# Patient Record
Sex: FEMALE | Race: BLACK OR AFRICAN AMERICAN | NOT HISPANIC OR LATINO | URBAN - METROPOLITAN AREA
[De-identification: names, ages, dates, MRNs, and addresses within clinical notes are randomized per-mention and may not be internally consistent; named-entity substitution may affect disease eponyms.]

---

## 2017-03-26 ENCOUNTER — EMERGENCY (EMERGENCY)
Facility: HOSPITAL | Age: 52
LOS: 1 days | Discharge: HOME | End: 2017-03-26
Attending: EMERGENCY MEDICINE | Admitting: EMERGENCY MEDICINE
Payer: COMMERCIAL

## 2017-03-26 VITALS
TEMPERATURE: 98 F | SYSTOLIC BLOOD PRESSURE: 150 MMHG | OXYGEN SATURATION: 100 % | HEART RATE: 72 BPM | RESPIRATION RATE: 16 BRPM | DIASTOLIC BLOOD PRESSURE: 79 MMHG

## 2017-03-26 VITALS
TEMPERATURE: 98 F | SYSTOLIC BLOOD PRESSURE: 167 MMHG | OXYGEN SATURATION: 100 % | DIASTOLIC BLOOD PRESSURE: 73 MMHG | HEART RATE: 80 BPM | RESPIRATION RATE: 16 BRPM

## 2017-03-26 DIAGNOSIS — Z98.890 OTHER SPECIFIED POSTPROCEDURAL STATES: Chronic | ICD-10-CM

## 2017-03-26 DIAGNOSIS — Z87.2 PERSONAL HISTORY OF DISEASES OF THE SKIN AND SUBCUTANEOUS TISSUE: Chronic | ICD-10-CM

## 2017-03-26 LAB
ALBUMIN SERPL ELPH-MCNC: 4.2 G/DL — SIGNIFICANT CHANGE UP (ref 3.3–5)
ALP SERPL-CCNC: 89 U/L — SIGNIFICANT CHANGE UP (ref 40–120)
ALT FLD-CCNC: 21 U/L — SIGNIFICANT CHANGE UP (ref 4–33)
APPEARANCE UR: SIGNIFICANT CHANGE UP
AST SERPL-CCNC: 20 U/L — SIGNIFICANT CHANGE UP (ref 4–32)
BACTERIA # UR AUTO: SIGNIFICANT CHANGE UP
BASOPHILS # BLD AUTO: 0.01 K/UL — SIGNIFICANT CHANGE UP (ref 0–0.2)
BASOPHILS NFR BLD AUTO: 0.2 % — SIGNIFICANT CHANGE UP (ref 0–2)
BILIRUB SERPL-MCNC: 0.2 MG/DL — SIGNIFICANT CHANGE UP (ref 0.2–1.2)
BILIRUB UR-MCNC: NEGATIVE — SIGNIFICANT CHANGE UP
BLOOD UR QL VISUAL: HIGH
BUN SERPL-MCNC: 8 MG/DL — SIGNIFICANT CHANGE UP (ref 7–23)
CALCIUM SERPL-MCNC: 9.4 MG/DL — SIGNIFICANT CHANGE UP (ref 8.4–10.5)
CHLORIDE SERPL-SCNC: 101 MMOL/L — SIGNIFICANT CHANGE UP (ref 98–107)
CO2 SERPL-SCNC: 28 MMOL/L — SIGNIFICANT CHANGE UP (ref 22–31)
COLOR SPEC: YELLOW — SIGNIFICANT CHANGE UP
CREAT SERPL-MCNC: 0.68 MG/DL — SIGNIFICANT CHANGE UP (ref 0.5–1.3)
EOSINOPHIL # BLD AUTO: 0.14 K/UL — SIGNIFICANT CHANGE UP (ref 0–0.5)
EOSINOPHIL NFR BLD AUTO: 2.3 % — SIGNIFICANT CHANGE UP (ref 0–6)
GLUCOSE SERPL-MCNC: 283 MG/DL — HIGH (ref 70–99)
GLUCOSE UR-MCNC: >1000 — SIGNIFICANT CHANGE UP
HCG UR-SCNC: NEGATIVE — SIGNIFICANT CHANGE UP
HCT VFR BLD CALC: 39 % — SIGNIFICANT CHANGE UP (ref 34.5–45)
HGB BLD-MCNC: 12.5 G/DL — SIGNIFICANT CHANGE UP (ref 11.5–15.5)
IMM GRANULOCYTES NFR BLD AUTO: 0.2 % — SIGNIFICANT CHANGE UP (ref 0–1.5)
KETONES UR-MCNC: NEGATIVE — SIGNIFICANT CHANGE UP
LEUKOCYTE ESTERASE UR-ACNC: HIGH
LIDOCAIN IGE QN: 35.6 U/L — SIGNIFICANT CHANGE UP (ref 7–60)
LYMPHOCYTES # BLD AUTO: 2.37 K/UL — SIGNIFICANT CHANGE UP (ref 1–3.3)
LYMPHOCYTES # BLD AUTO: 39.5 % — SIGNIFICANT CHANGE UP (ref 13–44)
MCHC RBC-ENTMCNC: 27.8 PG — SIGNIFICANT CHANGE UP (ref 27–34)
MCHC RBC-ENTMCNC: 32.1 % — SIGNIFICANT CHANGE UP (ref 32–36)
MCV RBC AUTO: 86.9 FL — SIGNIFICANT CHANGE UP (ref 80–100)
MONOCYTES # BLD AUTO: 0.33 K/UL — SIGNIFICANT CHANGE UP (ref 0–0.9)
MONOCYTES NFR BLD AUTO: 5.5 % — SIGNIFICANT CHANGE UP (ref 2–14)
MUCOUS THREADS # UR AUTO: SIGNIFICANT CHANGE UP
NEUTROPHILS # BLD AUTO: 3.14 K/UL — SIGNIFICANT CHANGE UP (ref 1.8–7.4)
NEUTROPHILS NFR BLD AUTO: 52.3 % — SIGNIFICANT CHANGE UP (ref 43–77)
NITRITE UR-MCNC: NEGATIVE — SIGNIFICANT CHANGE UP
PH UR: 6 — SIGNIFICANT CHANGE UP (ref 4.6–8)
PLATELET # BLD AUTO: 308 K/UL — SIGNIFICANT CHANGE UP (ref 150–400)
PMV BLD: 9.9 FL — SIGNIFICANT CHANGE UP (ref 7–13)
POTASSIUM SERPL-MCNC: 4 MMOL/L — SIGNIFICANT CHANGE UP (ref 3.5–5.3)
POTASSIUM SERPL-SCNC: 4 MMOL/L — SIGNIFICANT CHANGE UP (ref 3.5–5.3)
PROT SERPL-MCNC: 7.4 G/DL — SIGNIFICANT CHANGE UP (ref 6–8.3)
PROT UR-MCNC: 10 — SIGNIFICANT CHANGE UP
RBC # BLD: 4.49 M/UL — SIGNIFICANT CHANGE UP (ref 3.8–5.2)
RBC # FLD: 13.3 % — SIGNIFICANT CHANGE UP (ref 10.3–14.5)
RBC CASTS # UR COMP ASSIST: >50 — HIGH (ref 0–?)
SODIUM SERPL-SCNC: 144 MMOL/L — SIGNIFICANT CHANGE UP (ref 135–145)
SP GR SPEC: 1.02 — SIGNIFICANT CHANGE UP (ref 1–1.03)
SQUAMOUS # UR AUTO: SIGNIFICANT CHANGE UP
UROBILINOGEN FLD QL: NORMAL E.U. — SIGNIFICANT CHANGE UP (ref 0.1–0.2)
WBC # BLD: 6 K/UL — SIGNIFICANT CHANGE UP (ref 3.8–10.5)
WBC # FLD AUTO: 6 K/UL — SIGNIFICANT CHANGE UP (ref 3.8–10.5)
WBC UR QL: HIGH (ref 0–?)

## 2017-03-26 PROCEDURE — 74176 CT ABD & PELVIS W/O CONTRAST: CPT | Mod: 26

## 2017-03-26 PROCEDURE — 99284 EMERGENCY DEPT VISIT MOD MDM: CPT

## 2017-03-26 RX ORDER — PSEUDOEPHEDRINE HCL 30 MG
30 TABLET ORAL ONCE
Qty: 0 | Refills: 0 | Status: COMPLETED | OUTPATIENT
Start: 2017-03-26 | End: 2017-03-26

## 2017-03-26 RX ORDER — MOXIFLOXACIN HYDROCHLORIDE TABLETS, 400 MG 400 MG/1
1 TABLET, FILM COATED ORAL
Qty: 14 | Refills: 0 | OUTPATIENT
Start: 2017-03-26 | End: 2017-04-02

## 2017-03-26 RX ORDER — SODIUM CHLORIDE 9 MG/ML
1000 INJECTION INTRAMUSCULAR; INTRAVENOUS; SUBCUTANEOUS ONCE
Qty: 0 | Refills: 0 | Status: COMPLETED | OUTPATIENT
Start: 2017-03-26 | End: 2017-03-26

## 2017-03-26 RX ORDER — ACETAMINOPHEN 500 MG
650 TABLET ORAL ONCE
Qty: 0 | Refills: 0 | Status: COMPLETED | OUTPATIENT
Start: 2017-03-26 | End: 2017-03-26

## 2017-03-26 RX ADMIN — Medication 650 MILLIGRAM(S): at 22:09

## 2017-03-26 RX ADMIN — Medication 650 MILLIGRAM(S): at 21:39

## 2017-03-26 RX ADMIN — Medication 30 MILLIGRAM(S): at 17:23

## 2017-03-26 RX ADMIN — SODIUM CHLORIDE 1000 MILLILITER(S): 9 INJECTION INTRAMUSCULAR; INTRAVENOUS; SUBCUTANEOUS at 16:26

## 2017-03-26 NOTE — ED ADULT NURSE NOTE - NS ED NURSE DISCH DISPOSITION
AMA (saw a physician/midlevel provider and clinician was able to provide reasons for staying for treatment & form is signed) Discharged

## 2017-03-26 NOTE — ED ADULT NURSE NOTE - OBJECTIVE STATEMENT
Report received from RW nurse at 8:15PM. Patient awaiting CT results for flank pain with hematuria. Patient complains of moderate headache, attending aware. VS as documented.

## 2017-03-26 NOTE — ED PROVIDER NOTE - ABDOMINAL TENDER
periumbilical/umbilical/left costovertebral angle/suprapubic/left lower quadrant/epigastric/left upper quadrant/right lower quadrant/right upper quadrant

## 2017-03-26 NOTE — ED ADULT NURSE REASSESSMENT NOTE - NS ED NURSE REASSESS COMMENT FT1
Patient complains of IV discomfort, patient educated on importance of having access for blood draws and medication administration. Patient refused, attending aware. IV line discontinued.

## 2017-03-26 NOTE — ED PROVIDER NOTE - PROGRESS NOTE DETAILS
ED Attending Dr. Barajas: pt signed out to me from Dr. Suazo with plan to follow up CTAP; pt went for CT but states she was unable to breathe while laying on abdomen due to nasal congestion; gave pt oxymetazoline nasal spray and will have pt attempt CT again ED Attending Dr. Barajas: pt in NAD--does not want to wait for CT results; I explained to pt that leaving before CT results may mean leaving with a treatable condition that we may not find while she is in the ED; also expressed risk of pain/disability/death from leaving without results; pt understands and would like to leave AMA; I advised pt to return to ED at any time for any concerns; I prescribed pt 7 days of abx to treat for pyelonephritis in light of UA result and flank pain; advised PMD follow up within 48 hours; I left message with PA follow up line ([id number]) so that pt will be followed up ED Attending Dr. Barajas: gave pt copies of labs; left AMA paperwork in chart ED Attending Dr. Barajas: pt decided that she will stay until CT is resulted ED Attending Dr. Barajas: spoke to radiology--CT shows mild hydro and 3 mm distal ureter stone; discharge plan is same, with abx and ED Attending Dr. Barajas: checked FS because on CMP glucose was 283; FS at time of discharge; gave pt urology follow up list and instructions to follow up with urology within 1 week; gave strict return instructions for any worsening symptoms ED Attending Dr. Barajas: spoke to radiology--CT shows mild hydro and 3 mm distal ureter stone; discharge plan is same, with abx and will also give urology follow up; renal function WNL and WBC WNL ED Attending Dr. Barajas: checked FS because on CMP glucose was 283; FS at time of discharge after eating 248, glucose >1000 in UA, likely not well controlled DM at this time, recommended that pt follow up with PMD for further diabetic control; gave pt urology follow up list and instructions to follow up with urology within 1 week; gave strict return instructions for any worsening symptoms; pt to receive follow up call from [phone] follow up line ED Attending Dr. Barajas: checked FS because on [facility] glucose was 283--pt states she did not take her insulin last night and was without her DM medications during the day today; FS at time of discharge after eating 248, glucose >1000 in UA, likely not well controlled DM at this time, recommended that pt follow up with PMD for further diabetic control; gave pt urology follow up list and instructions to follow up with urology within 1 week; gave strict return instructions for any worsening symptoms; pt to receive follow up call from [facility] follow up line

## 2017-03-26 NOTE — ED PROVIDER NOTE - NS ED MD SCRIBE ATTENDING SCRIBE SECTIONS
PAST MEDICAL/SURGICAL/SOCIAL HISTORY/DISPOSITION/REVIEW OF SYSTEMS/HIV/PHYSICAL EXAM/VITAL SIGNS( Pullset)/HISTORY OF PRESENT ILLNESS

## 2017-03-26 NOTE — ED ADULT TRIAGE NOTE - CHIEF COMPLAINT QUOTE
states" I am having pain in my left side , with blood in the urine since Thursday with chills." was seen at urgent care center sent in for possible kidney stone

## 2017-03-26 NOTE — ED PROVIDER NOTE - OBJECTIVE STATEMENT
50yo F with PMHx of HTN (on Norvasc , HLD (on Crestor), DM (on Metformin, Lantus, Novalog), asthma (on albuterol), anemia (on Iron), seasonal allergies, fibroids (s/p surgery), presents to ED c/o L flank pain radiating around to her LLQ, dysuria for x4d. Pt also c/o nasal congestion and throat pain. Pt states pain is worse today, she was seen at an Urgi who did a UA, noticed blood in urine, and advised pt to come to ED to eval for kidney stones. LNMP Nov 16, 2016, pt perimenopausal. Denies vomiting. Allergy to sulfa.

## 2017-03-26 NOTE — ED PROVIDER NOTE - PLAN OF CARE
1. TAKE ALL MEDICATIONS AS DIRECTED.  2. FOLLOW UP WITH YOUR PRIMARY DOCTOR WITHIN 2 DAYS AS DIRECTED.  3. IF NEEDED, CALL PATIENT ACCESS SERVICES AT [phone] TO FIND A PRIMARY CARE PHYSICIAN.  OR CALL [phone] TO MAKE AN APPOINTMENT WITH THE CLINIC.  4. RETURN TO THE ER FOR ANY WORSENING SYMPTOMS OR CONCERNS.

## 2017-03-26 NOTE — ED PROVIDER NOTE - CARE PLAN
Principal Discharge DX:	Pyelonephritis  Instructions for follow-up, activity and diet:	1. TAKE ALL MEDICATIONS AS DIRECTED.  2. FOLLOW UP WITH YOUR PRIMARY DOCTOR WITHIN 2 DAYS AS DIRECTED.  3. IF NEEDED, CALL PATIENT ACCESS SERVICES AT [phone] TO FIND A PRIMARY CARE PHYSICIAN.  OR CALL [phone] TO MAKE AN APPOINTMENT WITH THE CLINIC.  4. RETURN TO THE ER FOR ANY WORSENING SYMPTOMS OR CONCERNS. Principal Discharge DX:	Pyelonephritis  Instructions for follow-up, activity and diet:	1. TAKE ALL MEDICATIONS AS DIRECTED.  2. FOLLOW UP WITH YOUR PRIMARY DOCTOR WITHIN 2 DAYS AS DIRECTED.  3. IF NEEDED, CALL PATIENT ACCESS SERVICES AT [phone] TO FIND A PRIMARY CARE PHYSICIAN.  OR CALL [phone] TO MAKE AN APPOINTMENT WITH THE CLINIC.  4. RETURN TO THE ER FOR ANY WORSENING SYMPTOMS OR CONCERNS. Principal Discharge DX:	Pyelonephritis  Instructions for follow-up, activity and diet:	1. TAKE ALL MEDICATIONS AS DIRECTED.  2. FOLLOW UP WITH YOUR PRIMARY DOCTOR WITHIN 2 DAYS AS DIRECTED.  3. IF NEEDED, CALL PATIENT ACCESS SERVICES AT [phone] TO FIND A PRIMARY CARE PHYSICIAN.  OR CALL [phone] TO MAKE AN APPOINTMENT WITH THE CLINIC.  4. RETURN TO THE ER FOR ANY WORSENING SYMPTOMS OR CONCERNS. Principal Discharge DX:	Pyelonephritis  Instructions for follow-up, activity and diet:	1. TAKE ALL MEDICATIONS AS DIRECTED.  2. FOLLOW UP WITH YOUR PRIMARY DOCTOR WITHIN 2 DAYS AS DIRECTED.  3. IF NEEDED, CALL PATIENT ACCESS SERVICES AT [phone] TO FIND A PRIMARY CARE PHYSICIAN.  OR CALL [phone] TO MAKE AN APPOINTMENT WITH THE CLINIC.  4. RETURN TO THE ER FOR ANY WORSENING SYMPTOMS OR CONCERNS. Principal Discharge DX:	Pyelonephritis  Instructions for follow-up, activity and diet:	1. TAKE ALL MEDICATIONS AS DIRECTED.  2. FOLLOW UP WITH YOUR PRIMARY DOCTOR WITHIN 2 DAYS AS DIRECTED.  3. IF NEEDED, CALL PATIENT ACCESS SERVICES AT [phone] TO FIND A PRIMARY CARE PHYSICIAN.  OR CALL [phone] TO MAKE AN APPOINTMENT WITH THE CLINIC.  4. RETURN TO THE ER FOR ANY WORSENING SYMPTOMS OR CONCERNS. Principal Discharge DX:	Pyelonephritis  Instructions for follow-up, activity and diet:	1. TAKE ALL MEDICATIONS AS DIRECTED.  2. FOLLOW UP WITH YOUR PRIMARY DOCTOR WITHIN 2 DAYS AS DIRECTED.  3. IF NEEDED, CALL PATIENT ACCESS SERVICES AT [phone] TO FIND A PRIMARY CARE PHYSICIAN.  OR CALL [phone] TO MAKE AN APPOINTMENT WITH THE CLINIC.  4. RETURN TO THE ER FOR ANY WORSENING SYMPTOMS OR CONCERNS. Principal Discharge DX:	Pyelonephritis  Instructions for follow-up, activity and diet:	1. TAKE ALL MEDICATIONS AS DIRECTED.  2. FOLLOW UP WITH YOUR PRIMARY DOCTOR WITHIN 2 DAYS AS DIRECTED.  3. IF NEEDED, CALL PATIENT ACCESS SERVICES AT [phone] TO FIND A PRIMARY CARE PHYSICIAN.  OR CALL [phone] TO MAKE AN APPOINTMENT WITH THE CLINIC.  4. RETURN TO THE ER FOR ANY WORSENING SYMPTOMS OR CONCERNS.  Secondary Diagnosis:	Nephrolithiasis Principal Discharge DX:	Pyelonephritis  Instructions for follow-up, activity and diet:	1. TAKE ALL MEDICATIONS AS DIRECTED.  2. FOLLOW UP WITH YOUR PRIMARY DOCTOR WITHIN 2 DAYS AS DIRECTED.  3. IF NEEDED, CALL PATIENT ACCESS SERVICES AT [phone] TO FIND A PRIMARY CARE PHYSICIAN.  OR CALL [phone] TO MAKE AN APPOINTMENT WITH THE CLINIC.  4. RETURN TO THE ER FOR ANY WORSENING SYMPTOMS OR CONCERNS.  Secondary Diagnosis:	Nephrolithiasis

## 2017-03-26 NOTE — ED PROVIDER NOTE - PMH
Anemia    Asthma    Diabetes    Fibroids    History of hypertension    Hypercholesterolemia    Seasonal allergies

## 2017-03-27 NOTE — ED POST DISCHARGE NOTE - RESULT SUMMARY
Pt left ED prior to CT scan result.  Pt seen in ED for flank pain.  Call pt to inform her of CT scan results and follow up status.

## 2017-11-04 ENCOUNTER — EMERGENCY (EMERGENCY)
Facility: HOSPITAL | Age: 52
LOS: 1 days | Discharge: HOME | End: 2017-11-04
Attending: EMERGENCY MEDICINE | Admitting: EMERGENCY MEDICINE
Payer: COMMERCIAL

## 2017-11-04 VITALS
DIASTOLIC BLOOD PRESSURE: 65 MMHG | HEART RATE: 66 BPM | OXYGEN SATURATION: 100 % | SYSTOLIC BLOOD PRESSURE: 127 MMHG | RESPIRATION RATE: 16 BRPM

## 2017-11-04 VITALS
OXYGEN SATURATION: 99 % | RESPIRATION RATE: 18 BRPM | HEART RATE: 88 BPM | SYSTOLIC BLOOD PRESSURE: 140 MMHG | DIASTOLIC BLOOD PRESSURE: 84 MMHG | TEMPERATURE: 98 F

## 2017-11-04 DIAGNOSIS — Z87.2 PERSONAL HISTORY OF DISEASES OF THE SKIN AND SUBCUTANEOUS TISSUE: Chronic | ICD-10-CM

## 2017-11-04 DIAGNOSIS — Z98.890 OTHER SPECIFIED POSTPROCEDURAL STATES: Chronic | ICD-10-CM

## 2017-11-04 PROCEDURE — 73502 X-RAY EXAM HIP UNI 2-3 VIEWS: CPT | Mod: 26,RT

## 2017-11-04 PROCEDURE — 99285 EMERGENCY DEPT VISIT HI MDM: CPT | Mod: 25

## 2017-11-04 PROCEDURE — 73564 X-RAY EXAM KNEE 4 OR MORE: CPT | Mod: 26,RT

## 2017-11-04 RX ORDER — MORPHINE SULFATE 50 MG/1
2 CAPSULE, EXTENDED RELEASE ORAL ONCE
Qty: 0 | Refills: 0 | Status: DISCONTINUED | OUTPATIENT
Start: 2017-11-04 | End: 2017-11-04

## 2017-11-04 RX ORDER — ACETAMINOPHEN 500 MG
650 TABLET ORAL ONCE
Qty: 0 | Refills: 0 | Status: COMPLETED | OUTPATIENT
Start: 2017-11-04 | End: 2017-11-04

## 2017-11-04 RX ORDER — IBUPROFEN 200 MG
600 TABLET ORAL ONCE
Qty: 0 | Refills: 0 | Status: COMPLETED | OUTPATIENT
Start: 2017-11-04 | End: 2017-11-04

## 2017-11-04 RX ADMIN — MORPHINE SULFATE 2 MILLIGRAM(S): 50 CAPSULE, EXTENDED RELEASE ORAL at 04:23

## 2017-11-04 RX ADMIN — Medication 600 MILLIGRAM(S): at 09:20

## 2017-11-04 RX ADMIN — MORPHINE SULFATE 2 MILLIGRAM(S): 50 CAPSULE, EXTENDED RELEASE ORAL at 06:55

## 2017-11-04 RX ADMIN — Medication 650 MILLIGRAM(S): at 09:19

## 2017-11-04 NOTE — ED PROVIDER NOTE - PROGRESS NOTE DETAILS
Rhiannon Eldridge, DO: Pt signed out to me by overnight team. Pt ambulatory with limp. Has hx of meniscal tear with +Meredith's test. Will d/c with f/u with Ortho - pt aware. Stable for discharge. Declining stronger pain medication. Educated re: RICE & otc pain control.

## 2017-11-04 NOTE — ED PROVIDER NOTE - CARE PLAN
Principal Discharge DX:	Right knee pain Principal Discharge DX:	Right knee pain  Instructions for follow-up, activity and diet:	1. Please follow up with your Orthopedic doctor in 3-5 days or a doctor given the list provided.  2. Please use 600mg Motrin (also called Advil or ibuprofen) every 6 hours as needed for pain/discomfort/swelling.  You can take this together with Tylenol 650mg  (also called acetaminophen) every 6 hours.  Both of these medications can be obtained over the counter.  Don't use more than 3500mg of Tylenol in any 24-hour period. Make sure your other prescription/over-the-counter medications don't contain any Tylenol so you don't take too much. If you have any stomach discomfort while taking Motrin, you can use TUMS or Pepcid or Zantac (these can all be bought without a prescription).   3. Rest, ice, and elevate the extremity.   4. Please return to the ED should you have any new or worsening symptoms or have any new concerns.   5. Read all attached.

## 2017-11-04 NOTE — ED ADULT NURSE NOTE - OBJECTIVE STATEMENT
pt. received in room 25 , A&Ox4 c/o right knee pain. Pt. states while she was dancing her knee felt like it "locked "and states she fell. Pt. denies any hit to the head. Pt. Vitals as noted, and in no acute distress. Awaiting pt. to be seen by MD. Pt. states to have hx. of DM . Will continue to monitor while in the ED.

## 2017-11-04 NOTE — ED PROVIDER NOTE - ATTENDING CONTRIBUTION TO CARE
Lenny: 51 yo female s/p mechanical fall c/o right knee and hip pain. Pt has chronic knee pain secondary to meniscus tear. Pt was dancing when her knee gave out and she fell. Pt reports that she fell backwards but denies head trauma and lOC. Denies direct trauma to her hip and knee. Exam: + right hip and lateral knee TTP, neurovascularly intact distally., no soft tissue swelling. Plan: xray

## 2017-11-04 NOTE — ED PROVIDER NOTE - MEDICAL DECISION MAKING DETAILS
51yo F with HTN, DM2, asthma, HLD, h/o R knee meniscal tear/repair who presents with sudden onset R knee pain and inability to bear weight. Will control pain, get XR knee and hip.

## 2017-11-04 NOTE — ED ADULT NURSE NOTE - CHIEF COMPLAINT QUOTE
Pt arrives via EMS. Pt states she was dancing at a club when "all of a sudden her R knee gave way" and she fell to the floor. Pt denies hitting head. R knee appears slightly swollen. Pt has hx of torn meniscus in R knee 2013.   EMS: Per EMS, upon arrival pt unable to bear weight on R leg was crying out in extreme pain; 20G in R Hand with 5mg MSO4 given en route to ER. Pt reports pain 0/10 at present.

## 2017-11-04 NOTE — ED PROVIDER NOTE - OBJECTIVE STATEMENT
51yo F with HTN, DM2 on insulin, HLD who presents with acute onset right knee pain. Patient was dancing tonight and her right knee suddenly gave out on her and she fell, landing on his butt. She denies hitting her head. She then started to have sharp pain in the knee both medial and lateral joint lines and superior to the patella. She has been unable to bear weight on that leg since the fall. She has a history of meniscal tear/repair of the R knee in 2013. She also reports right hip pain. She denies trauma to the knee or any snapping sensation. She denies headache, dizziness, LOC, chest pain, SOB, abdominal pain. 51yo F with HTN, DM2 on insulin, HLD, asthma who presents with acute onset right knee pain. Patient was dancing tonight and her right knee suddenly gave out on her and she fell, landing on his butt. She denies hitting her head. She then started to have sharp pain in the knee both medial and lateral joint lines and superior to the patella. She has been unable to bear weight on that leg since the fall. She has a history of meniscal tear/repair of the R knee in 2013. She also reports right hip pain. She denies trauma to the knee or any snapping sensation. She denies headache, dizziness, LOC, chest pain, SOB, abdominal pain.

## 2017-11-04 NOTE — ED PROVIDER NOTE - PLAN OF CARE
1. Please follow up with your Orthopedic doctor in 3-5 days or a doctor given the list provided.  2. Please use 600mg Motrin (also called Advil or ibuprofen) every 6 hours as needed for pain/discomfort/swelling.  You can take this together with Tylenol 650mg  (also called acetaminophen) every 6 hours.  Both of these medications can be obtained over the counter.  Don't use more than 3500mg of Tylenol in any 24-hour period. Make sure your other prescription/over-the-counter medications don't contain any Tylenol so you don't take too much. If you have any stomach discomfort while taking Motrin, you can use TUMS or Pepcid or Zantac (these can all be bought without a prescription).   3. Rest, ice, and elevate the extremity.   4. Please return to the ED should you have any new or worsening symptoms or have any new concerns.   5. Read all attached.

## 2017-11-04 NOTE — ED PROVIDER NOTE - NS ED ROS FT
REVIEW OF SYSTEMS:  CONSTITUTIONAL: No fever, weight loss, or fatigue  EYES: No eye pain, visual disturbances, or discharge  ENMT:  No difficulty hearing, tinnitus, vertigo; No sinus or throat pain  NECK: No pain or stiffness  RESPIRATORY: No cough, wheezing, chills or hemoptysis; No shortness of breath  CARDIOVASCULAR: No chest pain, palpitations, dizziness, or leg swelling  GASTROINTESTINAL: No abdominal or epigastric pain. No nausea, vomiting, or hematemesis; No diarrhea or constipation. No melena or hematochezia.  GENITOURINARY: No dysuria, frequency, hematuria, or incontinence  NEUROLOGICAL: No headaches, memory loss, loss of strength, numbness, or tremors  SKIN: No itching, burning, rashes, or lesions   ENDOCRINE: No heat or cold intolerance; No hair loss  MUSCULOSKELETAL: +right knee, hip pain; No muscle or back pain  PSYCHIATRIC: No depression, anxiety, mood swings, or difficulty sleeping  HEME/LYMPH: No easy bruising, or bleeding gums  ALLERY AND IMMUNOLOGIC: No hives or eczema

## 2017-11-04 NOTE — ED PROVIDER NOTE - PHYSICAL EXAMINATION
PHYSICAL EXAM:  GENERAL: NAD, obese, responding to questions appropriately  HEAD:  Atraumatic, Normocephalic  EYES: PERRLA, conjunctiva and sclera clear  ENMT: No tonsillar erythema, exudates, or enlargement; Moist mucous membranes, Good dentition, No lesions  CHEST/LUNG: Clear to auscultation bilaterally; No rales, rhonchi, wheezing, or rubs  HEART: Regular rate and rhythm; No murmurs, rubs, or gallops  ABDOMEN: Soft, Nontender, Nondistended; Bowel sounds present  VASCULAR:  2+ Peripheral Pulses, No clubbing, cyanosis, or edema  EXTREMITIES: R knee with mild swelling, no erythema or warmth, tenderness to palpation medial and lateral joint lines as well as pain with patellar movement, pain on palpation of popliteal fossa; limited flexion 2/2 pain; L knee without abnormality, full ROM, no tenderness.  SKIN: No rashes or lesions  NERVOUS SYSTEM:  Alert & Oriented X3, Good concentration

## 2017-11-04 NOTE — ED ADULT TRIAGE NOTE - CHIEF COMPLAINT QUOTE
Pt arrives via EMS. Pt states she was dancing at a club when "all of a sudden her R knee gave way" and she well to the floor. Pt denies hitting head. R knee appears slightly swollen.Pt has hx of torn meniscus in R knee 2013.   EMS: 20G in R Hand with 5mg MSO4 given en router to ER. Pt reports pain 0/10 at present. Pt arrives via EMS. Pt states she was dancing at a club when "all of a sudden her R knee gave way" and she fell to the floor. Pt denies hitting head. R knee appears slightly swollen.Pt has hx of torn meniscus in R knee 2013.   EMS: 20G in R Hand with 5mg MSO4 given en router to ER. Pt reports pain 0/10 at present. Pt arrives via EMS. Pt states she was dancing at a club when "all of a sudden her R knee gave way" and she fell to the floor. Pt denies hitting head. R knee appears slightly swollen.Pt has hx of torn meniscus in R knee 2013.   EMS: 20G in R Hand with 5mg MSO4 given en route to ER. Pt reports pain 0/10 at present. Pt arrives via EMS. Pt states she was dancing at a club when "all of a sudden her R knee gave way" and she fell to the floor. Pt denies hitting head. R knee appears slightly swollen. Pt has hx of torn meniscus in R knee 2013.   EMS: Per EMS, upon arrival pt unable to bear weight on R leg was crying out in extreme pain; 20G in R Hand with 5mg MSO4 given en route to ER. Pt reports pain 0/10 at present.

## 2017-11-04 NOTE — ED PROCEDURE NOTE - ATTENDING CONTRIBUTION TO CARE
Attending Statement: I have personally seen and examined this patient. I have fully participated in the care of this patient. I have reviewed all pertinent clinical information, including history physical exam, plan and the Resident's note and agree except as noted  pt comfortable w plan and discharge. able to use cane wo difficulty.

## 2017-11-04 NOTE — ED PROCEDURE NOTE - PROCEDURE ADDITIONAL DETAILS
Rhiannon Eldridge DO: Pt's R knee wrapped with compression dressing of ACE bandages x 2. Discharged with cane for assistance with ambulation.

## 2018-01-12 NOTE — ED ADULT NURSE NOTE - NS ED NOTE ABUSE SUSPICION NEGLECT YN
Physical Therapy Daily Treatment     Visit Count: 36  Plan of Care Dates: Initial: 10/10/2017 Through: 1/2/2018    Insurance Information: [employer] ACTIVE PER WEB, $15 COPAY, NO VISIT LIMIT NO AUTH REQUIRED  Next Referring Provider Visit: 12/12/17    Referred by: Suhail Garcia MD  Medical Diagnosis (from order):  Right total knee arthroplasty   Treatment Diagnosis: Knee Symptoms with Pain, Impaired Joint Mobility, Impaired Range of Motion, Impaired Motor Function/Muscle Performance, Impaired Gait/Locomotion Deficits and Impaired Balance  Insurance: 1. [employer] MEDICARE  2.     Date of Onset: 10/4/17   Diagnosis Precautions: Weight Bearing As Tolerated Right Lower Extremity  Chart reviewed: Relevant co-morbidities, allergies, tests and medications:      SUBJECTIVE   Pt reports no changes.    OBJECTIVE     Active assistive range of motion flexion seated 114 degrees after Nu step and bike.     12/22/17 12/27/17 12/29/17 1/2/18 1/5/18 1/10/18 1/12/18   NuStep seat 6 8 min 8 min 8 min Seat 4 to 3  8 min Seat 3  8 min 8 min 8 min   Bike seat 4 8 min 8 min 8 min 8 min 8 min 8 min 8 min   True stretch flexion 60\" 60\" 60\"  60\" 60\" 60\"   True stretch ext 60\" 60\" 60\"  60\" 60\" 60\"   gastroc board stretch 60\" 60\" 60\"  60\" 60\" 60\"       Post skilled treatment: Ice 10 minutes      Current Home Program   Decrease the lunges, steps, etc to once per day    ASSESSMENT   New record for flexion.    PLAN    2x/week for 2 weeks then 1x/week for 2 weeks.    THERAPY DAILY BILLING   Primary Insurance:  [employer] MEDICARE  Secondary Insurance:     Evaluation Procedures:  No evaluation codes were used on this date of service    Timed Procedures:  Therapeutic Exercise, 20 minutes    Untimed Procedures:  No untimed codes were used on this date of service        no

## 2018-01-18 ENCOUNTER — OUTPATIENT (OUTPATIENT)
Dept: OUTPATIENT SERVICES | Facility: HOSPITAL | Age: 53
LOS: 1 days | End: 2018-01-18
Payer: COMMERCIAL

## 2018-01-18 VITALS
HEIGHT: 63 IN | TEMPERATURE: 99 F | WEIGHT: 190.92 LBS | RESPIRATION RATE: 14 BRPM | SYSTOLIC BLOOD PRESSURE: 136 MMHG | HEART RATE: 71 BPM | DIASTOLIC BLOOD PRESSURE: 84 MMHG

## 2018-01-18 DIAGNOSIS — D25.9 LEIOMYOMA OF UTERUS, UNSPECIFIED: ICD-10-CM

## 2018-01-18 DIAGNOSIS — Z98.51 TUBAL LIGATION STATUS: Chronic | ICD-10-CM

## 2018-01-18 DIAGNOSIS — Z98.890 OTHER SPECIFIED POSTPROCEDURAL STATES: Chronic | ICD-10-CM

## 2018-01-18 DIAGNOSIS — E11.9 TYPE 2 DIABETES MELLITUS WITHOUT COMPLICATIONS: ICD-10-CM

## 2018-01-18 DIAGNOSIS — Z87.2 PERSONAL HISTORY OF DISEASES OF THE SKIN AND SUBCUTANEOUS TISSUE: Chronic | ICD-10-CM

## 2018-01-18 DIAGNOSIS — D25.9 LEIOMYOMA OF UTERUS, UNSPECIFIED: Chronic | ICD-10-CM

## 2018-01-18 LAB
BLD GP AB SCN SERPL QL: NEGATIVE — SIGNIFICANT CHANGE UP
BUN SERPL-MCNC: 12 MG/DL — SIGNIFICANT CHANGE UP (ref 7–23)
CALCIUM SERPL-MCNC: 9.5 MG/DL — SIGNIFICANT CHANGE UP (ref 8.4–10.5)
CHLORIDE SERPL-SCNC: 100 MMOL/L — SIGNIFICANT CHANGE UP (ref 98–107)
CO2 SERPL-SCNC: 29 MMOL/L — SIGNIFICANT CHANGE UP (ref 22–31)
CREAT SERPL-MCNC: 0.79 MG/DL — SIGNIFICANT CHANGE UP (ref 0.5–1.3)
GLUCOSE SERPL-MCNC: 233 MG/DL — HIGH (ref 70–99)
HBA1C BLD-MCNC: 10.2 % — HIGH (ref 4–5.6)
HCT VFR BLD CALC: 39.4 % — SIGNIFICANT CHANGE UP (ref 34.5–45)
HGB BLD-MCNC: 12.6 G/DL — SIGNIFICANT CHANGE UP (ref 11.5–15.5)
MCHC RBC-ENTMCNC: 27.9 PG — SIGNIFICANT CHANGE UP (ref 27–34)
MCHC RBC-ENTMCNC: 32 % — SIGNIFICANT CHANGE UP (ref 32–36)
MCV RBC AUTO: 87.2 FL — SIGNIFICANT CHANGE UP (ref 80–100)
NRBC # FLD: 0 — SIGNIFICANT CHANGE UP
PLATELET # BLD AUTO: 309 K/UL — SIGNIFICANT CHANGE UP (ref 150–400)
PMV BLD: 9.8 FL — SIGNIFICANT CHANGE UP (ref 7–13)
POTASSIUM SERPL-MCNC: 3.8 MMOL/L — SIGNIFICANT CHANGE UP (ref 3.5–5.3)
POTASSIUM SERPL-SCNC: 3.8 MMOL/L — SIGNIFICANT CHANGE UP (ref 3.5–5.3)
RBC # BLD: 4.52 M/UL — SIGNIFICANT CHANGE UP (ref 3.8–5.2)
RBC # FLD: 13.7 % — SIGNIFICANT CHANGE UP (ref 10.3–14.5)
RH IG SCN BLD-IMP: POSITIVE — SIGNIFICANT CHANGE UP
SODIUM SERPL-SCNC: 143 MMOL/L — SIGNIFICANT CHANGE UP (ref 135–145)
WBC # BLD: 5.45 K/UL — SIGNIFICANT CHANGE UP (ref 3.8–10.5)
WBC # FLD AUTO: 5.45 K/UL — SIGNIFICANT CHANGE UP (ref 3.8–10.5)

## 2018-01-18 PROCEDURE — 93010 ELECTROCARDIOGRAM REPORT: CPT

## 2018-01-18 RX ORDER — RAMIPRIL 5 MG
1 CAPSULE ORAL
Qty: 0 | Refills: 0 | COMMUNITY

## 2018-01-18 RX ORDER — SODIUM CHLORIDE 9 MG/ML
1000 INJECTION, SOLUTION INTRAVENOUS
Qty: 0 | Refills: 0 | Status: DISCONTINUED | OUTPATIENT
Start: 2018-01-26 | End: 2018-01-27

## 2018-01-18 NOTE — H&P PST ADULT - PROBLEM SELECTOR PLAN 1
Pt scheduled for dilation curettage hysteroscopy with prasad on 1/26/2018.  labs done results pending, ekg done.  Preop teaching done, pt able to verbalize understanding.

## 2018-01-18 NOTE — H&P PST ADULT - NEGATIVE GENERAL SYMPTOMS
no fever/no polyuria/no fatigue/no weight loss/no polydipsia/no chills/no sweating/no anorexia/no weight gain/no polyphagia/no malaise

## 2018-01-18 NOTE — H&P PST ADULT - GASTROINTESTINAL DETAILS
soft/nontender/no masses palpable/no rigidity/no organomegaly/no distention/bowel sounds normal/no bruit/no guarding/no rebound tenderness

## 2018-01-18 NOTE — H&P PST ADULT - PRIMARY CARE PROVIDER
Dr. Jackson pmd [phone], fax [phone] Dr. Jackson pmd [phone], fax [phone], Dr. Carrion endocrinologist [phone] Dr. Jackson pmd [phone], fax [phone],              Dr. Carrion endocrinologist [phone]

## 2018-01-18 NOTE — H&P PST ADULT - HISTORY OF PRESENT ILLNESS
53y/o female scheduled for dilation curettage hysteroscopy with symphion on 1/26/2018.  Pt states, "Heavy menstruation with menstruation.  US showed fibroids."

## 2018-01-18 NOTE — H&P PST ADULT - PSH
H/O knee surgery  right meniscus repair 2014  H/O myomectomy  4/2009  History of breast abscess  surgery, right 1984 H/O knee surgery  right meniscus repair 2014  H/O myomectomy  4/2009  History of breast abscess  surgery, right 1984  S/P tubal ligation  2004

## 2018-01-18 NOTE — H&P PST ADULT - NEGATIVE BREAST SYMPTOMS
no breast tenderness R/no breast lump L/no breast lump R/no nipple discharge L/no breast tenderness L/no nipple discharge R

## 2018-01-18 NOTE — H&P PST ADULT - PMH
Anemia    Asthma    Diabetes  type 2  Fibroids    History of hypertension    Hypercholesterolemia    Seasonal allergies

## 2018-01-18 NOTE — H&P PST ADULT - NEGATIVE CARDIOVASCULAR SYMPTOMS
no palpitations/no paroxysmal nocturnal dyspnea/no peripheral edema/no claudication/no chest pain/no orthopnea

## 2018-01-18 NOTE — H&P PST ADULT - NSANTHOSAYNRD_GEN_A_CORE
No. ALLA screening performed.  STOP BANG Legend: 0-2 = LOW Risk; 3-4 = INTERMEDIATE Risk; 5-8 = HIGH Risk

## 2018-01-18 NOTE — H&P PST ADULT - MEDICATION HERBAL REMEDIES, PROFILE
Abdominal wall ulcer  Intertriginous tinea infection  Continue the vancomycin and cefepime for now. Monitor the ulcer. Use interdry or other wicking fabric to try to dry out the area. Continue miconazole powder. Do not feel that she has deeper paniculitis infection at this time. Improving today.    no

## 2018-01-18 NOTE — H&P PST ADULT - RS GEN PE MLT RESP DETAILS PC
no wheezes/breath sounds equal/clear to auscultation bilaterally/no intercostal retractions/no chest wall tenderness/no rhonchi/no rales/respirations non-labored/good air movement

## 2018-01-18 NOTE — H&P PST ADULT - NEGATIVE GENERAL GENITOURINARY SYMPTOMS
no dysuria/no flank pain R/normal urinary frequency/no bladder infections/no flank pain L/no hematuria

## 2018-01-18 NOTE — H&P PST ADULT - PROBLEM SELECTOR PLAN 2
DM type 2 pt instructed to take Lantus 26 units night prior to surgery, no dm meds on the day of surgery

## 2018-01-25 RX ORDER — LIRAGLUTIDE 6 MG/ML
1 INJECTION SUBCUTANEOUS
Qty: 0 | Refills: 0 | COMMUNITY

## 2018-01-25 RX ORDER — INSULIN GLARGINE 100 [IU]/ML
55 INJECTION, SOLUTION SUBCUTANEOUS
Qty: 0 | Refills: 0 | COMMUNITY

## 2018-01-25 NOTE — ASU PATIENT PROFILE, ADULT - PSH
H/O knee surgery  right meniscus repair 2014  H/O myomectomy  4/2009  History of breast abscess  surgery, right 1984  S/P tubal ligation  2004

## 2018-01-26 ENCOUNTER — OUTPATIENT (OUTPATIENT)
Dept: OUTPATIENT SERVICES | Facility: HOSPITAL | Age: 53
LOS: 1 days | Discharge: HOME | End: 2018-01-26
Payer: COMMERCIAL

## 2018-01-26 ENCOUNTER — RESULT REVIEW (OUTPATIENT)
Age: 53
End: 2018-01-26

## 2018-01-26 ENCOUNTER — TRANSCRIPTION ENCOUNTER (OUTPATIENT)
Age: 53
End: 2018-01-26

## 2018-01-26 VITALS
DIASTOLIC BLOOD PRESSURE: 65 MMHG | OXYGEN SATURATION: 99 % | SYSTOLIC BLOOD PRESSURE: 118 MMHG | RESPIRATION RATE: 17 BRPM | HEART RATE: 68 BPM | TEMPERATURE: 98 F

## 2018-01-26 VITALS
SYSTOLIC BLOOD PRESSURE: 124 MMHG | OXYGEN SATURATION: 99 % | HEART RATE: 70 BPM | HEIGHT: 63 IN | DIASTOLIC BLOOD PRESSURE: 69 MMHG | WEIGHT: 190.92 LBS | RESPIRATION RATE: 16 BRPM | TEMPERATURE: 98 F

## 2018-01-26 DIAGNOSIS — Z98.51 TUBAL LIGATION STATUS: Chronic | ICD-10-CM

## 2018-01-26 DIAGNOSIS — Z98.890 OTHER SPECIFIED POSTPROCEDURAL STATES: Chronic | ICD-10-CM

## 2018-01-26 DIAGNOSIS — Z87.2 PERSONAL HISTORY OF DISEASES OF THE SKIN AND SUBCUTANEOUS TISSUE: Chronic | ICD-10-CM

## 2018-01-26 DIAGNOSIS — D25.9 LEIOMYOMA OF UTERUS, UNSPECIFIED: ICD-10-CM

## 2018-01-26 LAB — GLUCOSE BLDC GLUCOMTR-MCNC: 206 MG/DL — HIGH (ref 70–99)

## 2018-01-26 PROCEDURE — 88305 TISSUE EXAM BY PATHOLOGIST: CPT | Mod: 26

## 2018-01-26 RX ORDER — INSULIN GLARGINE 100 [IU]/ML
52 INJECTION, SOLUTION SUBCUTANEOUS
Qty: 0 | Refills: 0 | COMMUNITY

## 2018-01-26 RX ORDER — FENTANYL CITRATE 50 UG/ML
50 INJECTION INTRAVENOUS
Qty: 0 | Refills: 0 | Status: DISCONTINUED | OUTPATIENT
Start: 2018-01-26 | End: 2018-01-27

## 2018-01-26 RX ORDER — LIRAGLUTIDE 6 MG/ML
1.8 INJECTION SUBCUTANEOUS
Qty: 0 | Refills: 0 | COMMUNITY

## 2018-01-26 RX ORDER — METFORMIN HYDROCHLORIDE 850 MG/1
1 TABLET ORAL
Qty: 0 | Refills: 0 | COMMUNITY

## 2018-01-26 RX ORDER — SODIUM CHLORIDE 9 MG/ML
1000 INJECTION, SOLUTION INTRAVENOUS
Qty: 0 | Refills: 0 | Status: DISCONTINUED | OUTPATIENT
Start: 2018-01-26 | End: 2018-01-27

## 2018-01-26 RX ORDER — ROSUVASTATIN CALCIUM 5 MG/1
1 TABLET ORAL
Qty: 0 | Refills: 0 | COMMUNITY

## 2018-01-26 RX ORDER — OXYCODONE HYDROCHLORIDE 5 MG/1
5 TABLET ORAL ONCE
Qty: 0 | Refills: 0 | Status: DISCONTINUED | OUTPATIENT
Start: 2018-01-26 | End: 2018-01-27

## 2018-01-26 RX ORDER — ASPIRIN/CALCIUM CARB/MAGNESIUM 324 MG
1 TABLET ORAL
Qty: 0 | Refills: 0 | COMMUNITY

## 2018-01-26 RX ORDER — FENTANYL CITRATE 50 UG/ML
25 INJECTION INTRAVENOUS
Qty: 0 | Refills: 0 | Status: DISCONTINUED | OUTPATIENT
Start: 2018-01-26 | End: 2018-01-27

## 2018-01-26 RX ORDER — AMLODIPINE BESYLATE 2.5 MG/1
1 TABLET ORAL
Qty: 0 | Refills: 0 | COMMUNITY

## 2018-01-26 RX ORDER — RAMIPRIL 5 MG
1 CAPSULE ORAL
Qty: 0 | Refills: 0 | COMMUNITY

## 2018-01-26 RX ADMIN — SODIUM CHLORIDE 150 MILLILITER(S): 9 INJECTION, SOLUTION INTRAVENOUS at 09:57

## 2018-01-26 RX ADMIN — FENTANYL CITRATE 50 MICROGRAM(S): 50 INJECTION INTRAVENOUS at 09:56

## 2018-01-26 NOTE — ASU DISCHARGE PLAN (ADULT/PEDIATRIC). - ACTIVITY LEVEL
no tampons/no douching/nothing per vagina/no tub baths/no intercourse no tampons/no douching/nothing per vagina/no tub baths/no intercourse/x  2 weeks

## 2018-01-26 NOTE — ASU DISCHARGE PLAN (ADULT/PEDIATRIC). - NURSING INSTRUCTIONS
You received IV tylenol and IV toradol in the OR at  8 am,  your next dose of tylenol and/ or motrin  ( if needed) will be in 6  hrs at 2 pm.

## 2018-01-26 NOTE — ASU DISCHARGE PLAN (ADULT/PEDIATRIC). - NOTIFY
Pain not relieved by Medications/Inability to Tolerate Liquids or Foods/Bleeding that does not stop/GYN Fever>100.4/Unable to Urinate/Increased Irritability or Sluggishness/Persistent Nausea and Vomiting

## 2018-02-01 LAB — SURGICAL PATHOLOGY STUDY: SIGNIFICANT CHANGE UP

## 2018-06-25 NOTE — ED PROVIDER NOTE - DISCHARGE DATE
Interval History:     Past Medical History:   Diagnosis Date    Bipolar disorder     CHF (congestive heart failure)     Dyslipidemia     GERD (gastroesophageal reflux disease)     Hx of tracheostomy     Decanulated / surgically removed in 2013? Does not currently have tracheostomy    Hypertension     Hypothyroidism     Oxygen dependent     3L around the clock     Pulmonary HTN     Pulmonary hypertension, group 1 10/4/2017    Pulmonary nodules 10/10/2017    Respiratory failure, chronic        Past Surgical History:   Procedure Laterality Date    BACK SURGERY      PERICARDIAL WINDOW  2013    [place] LEFT HEART CATH,PERCUTANEOUS      Cardiac Cath, Left Heart    TUBAL LIGATION         Review of patient's allergies indicates:   Allergen Reactions    Sulfa (sulfonamide antibiotics) Rash       Medications:  Continuous Infusions:   furosemide (LASIX) 2 mg/mL infusion (non-titrating) 20 mg/hr (06/25/18 6037)    treprostinil (REMODULIN) infusion 75 ng/kg/min (06/24/18 1415)    veletri/remodulin tubing       Scheduled Meds:   albuterol sulfate  2.5 mg Nebulization Q4H    amLODIPine  5 mg Oral Daily    busPIRone  15 mg Oral TID    desvenlafaxine succinate  100 mg Oral Daily    enoxaparin  40 mg Subcutaneous Daily    fluticasone-vilanterol  1 puff Inhalation Daily    lamoTRIgine  100 mg Oral BID    [START ON 6/26/2018] levothyroxine  75 mcg Oral Before breakfast    lurasidone  80 mg Oral QHS    metOLazone  2.5 mg Oral Daily    mupirocin   Topical (Top) BID    pantoprazole  40 mg Oral Daily    pravastatin  40 mg Oral Daily    riociguat  1 mg Oral TID    sodium chloride 0.9%  3 mL Intravenous Q8H    spironolactone  100 mg Oral Daily     PRN Meds:ALPRAZolam, HYDROcodone-acetaminophen, ondansetron    Family History     Problem Relation (Age of Onset)    Cancer Mother, Sister    Hypertension Mother, Father        Social History Main Topics    Smoking status: Former Smoker     Types: Cigarettes      Start date: 1/1/1979     Quit date: 1/5/1997    Smokeless tobacco: Never Used    Alcohol use No    Drug use: No    Sexual activity: No       Review of Systems   Constitutional: Positive for activity change and fatigue.   Respiratory: Positive for shortness of breath.    Cardiovascular: Positive for leg swelling.   Gastrointestinal: Positive for abdominal distention.   Skin: Positive for pallor.   Neurological: Positive for weakness.   Psychiatric/Behavioral: Negative for agitation. The patient is nervous/anxious.      Objective:     Vital Signs (Most Recent):  Temp: 98.7 °F (37.1 °C) (06/25/18 1130)  Pulse: 86 (06/25/18 1218)  Resp: 14 (06/25/18 1218)  BP: 111/62 (06/25/18 1130)  SpO2: (!) 90 % (06/25/18 1218) Vital Signs (24h Range):  Temp:  [96.3 °F (35.7 °C)-99.2 °F (37.3 °C)] 98.7 °F (37.1 °C)  Pulse:  [76-90] 86  Resp:  [14-25] 14  SpO2:  [84 %-95 %] 90 %  BP: ()/(50-62) 111/62     Weight: 86.2 kg (190 lb 0.6 oz)  Body mass index is 34.76 kg/m².    Review of Symptoms  Symptom Assessment (ESAS 0-10 scale)   ESAS 0 1 2 3 4 5 6 7 8 9 10   Pain X             Dyspnea X             Anxiety      X        Nausea X             Depression  X             Anorexia X             Fatigue    X          Insomnia X             Restlessness  X             Agitation X             CAM / Delirium: negative    Constipation    Negative    Diarrhea : negative   Bowel Management Plan (BMP): No    Comments: no complaints of pain or discomfort     Pain Assessment:   OME in 24 hours: 10   Performance Status: 60    ECOG Performance Status Grade: 1 - Ambulates, capable of light work    Physical Exam   Constitutional: She is oriented to person, place, and time. She appears well-developed. No distress.   Neck: JVD present.   Cardiovascular: Normal rate, regular rhythm and normal heart sounds.    Pulmonary/Chest:   Dyspnea on exertion   Abdominal: Soft. Bowel sounds are normal. She exhibits distension.   Musculoskeletal: Normal range  of motion. She exhibits edema.   Neurological: She is alert and oriented to person, place, and time.   Skin: Skin is warm and dry. Capillary refill takes 2 to 3 seconds. There is pallor.   Psychiatric: She has a normal mood and affect. Her behavior is normal. Judgment and thought content normal.   Nursing note and vitals reviewed.      Significant Labs: All pertinent labs within the past 24 hours have been reviewed.  CBC:     Recent Labs  Lab 06/22/18  1155   WBC 5.28   HGB 11.7*   HCT 38.2   MCV 83   *     BMP:    Recent Labs  Lab 06/25/18  0426 06/25/18  0903   GLU 88 95    138   K 2.9* 3.4*   CL 94* 92*   CO2 33* 33*   BUN 18 17   CREATININE 1.6* 1.6*   CALCIUM 10.0 10.3   MG 2.0  --      LFT:  Lab Results   Component Value Date    AST 12 06/22/2018    ALKPHOS 210 (H) 06/22/2018    BILITOT 0.7 06/22/2018     Albumin:   Albumin   Date Value Ref Range Status   06/22/2018 3.8 3.5 - 5.2 g/dL Final     Protein:   Total Protein   Date Value Ref Range Status   06/22/2018 6.6 6.0 - 8.4 g/dL Final     Lactic acid:   No results found for: LACTATE    Significant Imaging: I have reviewed all pertinent imaging results/findings within the past 24 hours.    Advanced Directives::  Living Will: No  LaPOST: No  Do Not Resuscitate Status: No  Medical Power of : No, able to make decisions, next of kin: two adult daughters     Decision-Making Capacity: Patient answered questions    Living Arrangements: Lives alone, will be moving to daughter Howard's house when her lease is up     Psychosocial/Cultural: ,  Two adult daughters: Radha and Giles, one grand  daughter Keny, retired from [employer] - loved her job,  Enjoys spending time with family and caring for her grand daughter       Spiritual:     F- Rosalie and Belief: Amish   I - Importance: believes in God, not very Yazdanism .  C - Community:   A - Address in Care: Father Tommy has visited. Not sure if she had anointing of the sick, if not  she is requesting it.    04-Nov-2017

## 2018-12-31 ENCOUNTER — EMERGENCY (EMERGENCY)
Facility: HOSPITAL | Age: 53
LOS: 1 days | Discharge: HOME | End: 2018-12-31
Attending: EMERGENCY MEDICINE | Admitting: EMERGENCY MEDICINE
Payer: COMMERCIAL

## 2018-12-31 VITALS
RESPIRATION RATE: 15 BRPM | DIASTOLIC BLOOD PRESSURE: 64 MMHG | OXYGEN SATURATION: 100 % | HEART RATE: 60 BPM | SYSTOLIC BLOOD PRESSURE: 167 MMHG | TEMPERATURE: 98 F

## 2018-12-31 VITALS
TEMPERATURE: 98 F | SYSTOLIC BLOOD PRESSURE: 148 MMHG | HEART RATE: 67 BPM | RESPIRATION RATE: 16 BRPM | OXYGEN SATURATION: 99 % | DIASTOLIC BLOOD PRESSURE: 81 MMHG

## 2018-12-31 DIAGNOSIS — Z98.51 TUBAL LIGATION STATUS: Chronic | ICD-10-CM

## 2018-12-31 DIAGNOSIS — Z87.2 PERSONAL HISTORY OF DISEASES OF THE SKIN AND SUBCUTANEOUS TISSUE: Chronic | ICD-10-CM

## 2018-12-31 DIAGNOSIS — Z98.890 OTHER SPECIFIED POSTPROCEDURAL STATES: Chronic | ICD-10-CM

## 2018-12-31 PROBLEM — J30.2 OTHER SEASONAL ALLERGIC RHINITIS: Chronic | Status: ACTIVE | Noted: 2017-03-26

## 2018-12-31 PROBLEM — D25.9 LEIOMYOMA OF UTERUS, UNSPECIFIED: Chronic | Status: ACTIVE | Noted: 2017-03-26

## 2018-12-31 LAB
ALBUMIN SERPL ELPH-MCNC: 4.4 G/DL — SIGNIFICANT CHANGE UP (ref 3.3–5)
ALP SERPL-CCNC: 92 U/L — SIGNIFICANT CHANGE UP (ref 40–120)
ALT FLD-CCNC: 23 U/L — SIGNIFICANT CHANGE UP (ref 4–33)
APPEARANCE UR: SIGNIFICANT CHANGE UP
AST SERPL-CCNC: 32 U/L — SIGNIFICANT CHANGE UP (ref 4–32)
BACTERIA # UR AUTO: SIGNIFICANT CHANGE UP
BASE EXCESS BLDV CALC-SCNC: 6.5 MMOL/L — SIGNIFICANT CHANGE UP
BASOPHILS # BLD AUTO: 0.02 K/UL — SIGNIFICANT CHANGE UP (ref 0–0.2)
BASOPHILS NFR BLD AUTO: 0.3 % — SIGNIFICANT CHANGE UP (ref 0–2)
BILIRUB SERPL-MCNC: 0.4 MG/DL — SIGNIFICANT CHANGE UP (ref 0.2–1.2)
BILIRUB UR-MCNC: NEGATIVE — SIGNIFICANT CHANGE UP
BLOOD GAS VENOUS - CREATININE: 0.41 MG/DL — LOW (ref 0.5–1.3)
BLOOD UR QL VISUAL: SIGNIFICANT CHANGE UP
BUN SERPL-MCNC: 9 MG/DL — SIGNIFICANT CHANGE UP (ref 7–23)
CALCIUM SERPL-MCNC: 9.9 MG/DL — SIGNIFICANT CHANGE UP (ref 8.4–10.5)
CHLORIDE BLDV-SCNC: 103 MMOL/L — SIGNIFICANT CHANGE UP (ref 96–108)
CHLORIDE SERPL-SCNC: 100 MMOL/L — SIGNIFICANT CHANGE UP (ref 98–107)
CO2 SERPL-SCNC: 27 MMOL/L — SIGNIFICANT CHANGE UP (ref 22–31)
COLOR SPEC: YELLOW — SIGNIFICANT CHANGE UP
CREAT SERPL-MCNC: 0.56 MG/DL — SIGNIFICANT CHANGE UP (ref 0.5–1.3)
EOSINOPHIL # BLD AUTO: 0.07 K/UL — SIGNIFICANT CHANGE UP (ref 0–0.5)
EOSINOPHIL NFR BLD AUTO: 1 % — SIGNIFICANT CHANGE UP (ref 0–6)
GAS PNL BLDV: 138 MMOL/L — SIGNIFICANT CHANGE UP (ref 136–146)
GLUCOSE BLDV-MCNC: 257 — HIGH (ref 70–99)
GLUCOSE SERPL-MCNC: 217 MG/DL — HIGH (ref 70–99)
GLUCOSE UR-MCNC: 50 — SIGNIFICANT CHANGE UP
HCO3 BLDV-SCNC: 28 MMOL/L — HIGH (ref 20–27)
HCT VFR BLD CALC: 40.4 % — SIGNIFICANT CHANGE UP (ref 34.5–45)
HCT VFR BLDV CALC: 39.7 % — SIGNIFICANT CHANGE UP (ref 34.5–45)
HGB BLD-MCNC: 12.9 G/DL — SIGNIFICANT CHANGE UP (ref 11.5–15.5)
HGB BLDV-MCNC: 12.9 G/DL — SIGNIFICANT CHANGE UP (ref 11.5–15.5)
HYALINE CASTS # UR AUTO: HIGH
IMM GRANULOCYTES # BLD AUTO: 0.02 # — SIGNIFICANT CHANGE UP
IMM GRANULOCYTES NFR BLD AUTO: 0.3 % — SIGNIFICANT CHANGE UP (ref 0–1.5)
KETONES UR-MCNC: NEGATIVE — SIGNIFICANT CHANGE UP
LACTATE BLDV-MCNC: 2.1 MMOL/L — HIGH (ref 0.5–2)
LEUKOCYTE ESTERASE UR-ACNC: SIGNIFICANT CHANGE UP
LYMPHOCYTES # BLD AUTO: 2.97 K/UL — SIGNIFICANT CHANGE UP (ref 1–3.3)
LYMPHOCYTES # BLD AUTO: 44.3 % — HIGH (ref 13–44)
MCHC RBC-ENTMCNC: 26.9 PG — LOW (ref 27–34)
MCHC RBC-ENTMCNC: 31.9 % — LOW (ref 32–36)
MCV RBC AUTO: 84.2 FL — SIGNIFICANT CHANGE UP (ref 80–100)
MONOCYTES # BLD AUTO: 0.34 K/UL — SIGNIFICANT CHANGE UP (ref 0–0.9)
MONOCYTES NFR BLD AUTO: 5.1 % — SIGNIFICANT CHANGE UP (ref 2–14)
NEUTROPHILS # BLD AUTO: 3.28 K/UL — SIGNIFICANT CHANGE UP (ref 1.8–7.4)
NEUTROPHILS NFR BLD AUTO: 49 % — SIGNIFICANT CHANGE UP (ref 43–77)
NITRITE UR-MCNC: NEGATIVE — SIGNIFICANT CHANGE UP
NRBC # FLD: 0 — SIGNIFICANT CHANGE UP
PCO2 BLDV: 62 MMHG — HIGH (ref 41–51)
PH BLDV: 7.34 PH — SIGNIFICANT CHANGE UP (ref 7.32–7.43)
PH UR: 6 — SIGNIFICANT CHANGE UP (ref 5–8)
PLATELET # BLD AUTO: 313 K/UL — SIGNIFICANT CHANGE UP (ref 150–400)
PMV BLD: 9.3 FL — SIGNIFICANT CHANGE UP (ref 7–13)
PO2 BLDV: 26 MMHG — LOW (ref 35–40)
POTASSIUM BLDV-SCNC: 3.9 MMOL/L — SIGNIFICANT CHANGE UP (ref 3.4–4.5)
POTASSIUM SERPL-MCNC: 4.9 MMOL/L — SIGNIFICANT CHANGE UP (ref 3.5–5.3)
POTASSIUM SERPL-SCNC: 4.9 MMOL/L — SIGNIFICANT CHANGE UP (ref 3.5–5.3)
PROT SERPL-MCNC: 7.9 G/DL — SIGNIFICANT CHANGE UP (ref 6–8.3)
PROT UR-MCNC: 50 — SIGNIFICANT CHANGE UP
RBC # BLD: 4.8 M/UL — SIGNIFICANT CHANGE UP (ref 3.8–5.2)
RBC # FLD: 13.2 % — SIGNIFICANT CHANGE UP (ref 10.3–14.5)
RBC CASTS # UR COMP ASSIST: SIGNIFICANT CHANGE UP (ref 0–?)
SAO2 % BLDV: 38.7 % — LOW (ref 60–85)
SODIUM SERPL-SCNC: 139 MMOL/L — SIGNIFICANT CHANGE UP (ref 135–145)
SP GR SPEC: 1.03 — SIGNIFICANT CHANGE UP (ref 1–1.04)
SQUAMOUS # UR AUTO: SIGNIFICANT CHANGE UP
UROBILINOGEN FLD QL: NORMAL — SIGNIFICANT CHANGE UP
WBC # BLD: 6.7 K/UL — SIGNIFICANT CHANGE UP (ref 3.8–10.5)
WBC # FLD AUTO: 6.7 K/UL — SIGNIFICANT CHANGE UP (ref 3.8–10.5)
WBC UR QL: >50 — HIGH (ref 0–?)

## 2018-12-31 PROCEDURE — 99285 EMERGENCY DEPT VISIT HI MDM: CPT

## 2018-12-31 PROCEDURE — 76770 US EXAM ABDO BACK WALL COMP: CPT | Mod: 26

## 2018-12-31 RX ORDER — CEPHALEXIN 500 MG
1 CAPSULE ORAL
Qty: 20 | Refills: 0
Start: 2018-12-31 | End: 2019-01-09

## 2018-12-31 RX ORDER — MORPHINE SULFATE 50 MG/1
4 CAPSULE, EXTENDED RELEASE ORAL ONCE
Qty: 0 | Refills: 0 | Status: DISCONTINUED | OUTPATIENT
Start: 2018-12-31 | End: 2018-12-31

## 2018-12-31 RX ORDER — SODIUM CHLORIDE 9 MG/ML
1000 INJECTION INTRAMUSCULAR; INTRAVENOUS; SUBCUTANEOUS ONCE
Qty: 0 | Refills: 0 | Status: COMPLETED | OUTPATIENT
Start: 2018-12-31 | End: 2018-12-31

## 2018-12-31 RX ORDER — CEFTRIAXONE 500 MG/1
1 INJECTION, POWDER, FOR SOLUTION INTRAMUSCULAR; INTRAVENOUS ONCE
Qty: 0 | Refills: 0 | Status: COMPLETED | OUTPATIENT
Start: 2018-12-31 | End: 2018-12-31

## 2018-12-31 RX ORDER — DIAZEPAM 5 MG
5 TABLET ORAL ONCE
Qty: 0 | Refills: 0 | Status: DISCONTINUED | OUTPATIENT
Start: 2018-12-31 | End: 2018-12-31

## 2018-12-31 RX ORDER — KETOROLAC TROMETHAMINE 30 MG/ML
15 SYRINGE (ML) INJECTION ONCE
Qty: 0 | Refills: 0 | Status: DISCONTINUED | OUTPATIENT
Start: 2018-12-31 | End: 2018-12-31

## 2018-12-31 RX ADMIN — Medication 5 MILLIGRAM(S): at 16:19

## 2018-12-31 RX ADMIN — SODIUM CHLORIDE 1000 MILLILITER(S): 9 INJECTION INTRAMUSCULAR; INTRAVENOUS; SUBCUTANEOUS at 17:05

## 2018-12-31 RX ADMIN — CEFTRIAXONE 100 GRAM(S): 500 INJECTION, POWDER, FOR SOLUTION INTRAMUSCULAR; INTRAVENOUS at 20:35

## 2018-12-31 RX ADMIN — MORPHINE SULFATE 4 MILLIGRAM(S): 50 CAPSULE, EXTENDED RELEASE ORAL at 16:02

## 2018-12-31 RX ADMIN — SODIUM CHLORIDE 1000 MILLILITER(S): 9 INJECTION INTRAMUSCULAR; INTRAVENOUS; SUBCUTANEOUS at 16:03

## 2018-12-31 RX ADMIN — MORPHINE SULFATE 4 MILLIGRAM(S): 50 CAPSULE, EXTENDED RELEASE ORAL at 16:17

## 2018-12-31 NOTE — ED PROVIDER NOTE - OBJECTIVE STATEMENT
52yo F h/o HTN, DM with lower back pain radiating to lower abd since last night. Pt has had lower back pain for years, but never this severe and doesn't usually come around to the lower abdomen. NO trauma, but has been lifting her grandchildren a lot lately. NO fevers, no chills, no nausea, no vomiting, no urinary retention or incontinence, no constipation, no le weakness, numbness or tingling.  Pain is worse with position changes, improves when standing,

## 2018-12-31 NOTE — ED ADULT NURSE NOTE - OBJECTIVE STATEMENT
Receive pt in intake alert and oriented x 3 c/o abd pain radiating to  back pain , difficulty sitting, standing/ambulating.   Denies N/V/, constipation.  IV placed. Labs sent.  Meds given as per order.  NS in progress Receive pt in intake alert and oriented x 3 c/o abd pain radiating to  back pain , difficulty sitting, standing/ambulating.   Denies N/V/, constipation, LE numbness/tingling.  IV placed. Labs sent.  Meds given as per order.  NS in progress

## 2018-12-31 NOTE — ED PROVIDER NOTE - ATTENDING CONTRIBUTION TO CARE
Attending Statement: I have personally seen and examined this patient. I have fully participated in the care of this patient. I have reviewed all pertinent clinical information, including history physical exam, plan and the Resident's note and agree except as noted  52yo F hx of HTN, DM, HLD, uterine fibroids pw lower back pain radiate to the right. States she has hx of lower back pain, "not this bad" Endorse pain mid lower back pain for a week, worse when "sit or move" radiate to the right abdomen x one day.  "very sharp" "feel like old stone" hx of kidney stones Attending Statement: I have personally seen and examined this patient. I have fully participated in the care of this patient. I have reviewed all pertinent clinical information, including history physical exam, plan and the Resident's note and agree except as noted  52yo F hx of HTN, DM, HLD, uterine fibroids pw lower back pain radiate to the right. States she has hx of lower back pain, "not this bad" Endorse pain mid lower back pain for a week, worse when "sit or move" radiate to the right abdomen x one day.  "very sharp" "feel like old stone" hx of kidney stones 'Feels similar pain" Today endorsing "dysuria" no hematuria no urgency no frequency. +nausea, no vomit no diarrhea. no trauma. no urine or bowel incontinence. no numbness or weakness.  Vital signs noted. pt sitting up, looks uncomfortable due to pain. no resp distress. normal S1-S2 No resp distress. able to speak in full and clear sentences. no wheeze, rales or stridor. soft obese female tender mild lumbar spine. no step off. +right cvat. +right mid/lower quad. neg stephens. no guarding or rebound. no pedal edema. no calf tenderness. normal pulses bilateral feet. nl gait. Ao3  plan medicate, labs, urine, ct chest/abdomen given pt discomfort, re assess

## 2018-12-31 NOTE — ED ADULT NURSE NOTE - NSIMPLEMENTINTERV_GEN_ALL_ED
Implemented All Universal Safety Interventions:  [place] to call system. Call bell, personal items and telephone within reach. Instruct patient to call for assistance. Room bathroom lighting operational. Non-slip footwear when patient is off stretcher. Physically safe environment: no spills, clutter or unnecessary equipment. Stretcher in lowest position, wheels locked, appropriate side rails in place.

## 2018-12-31 NOTE — ED PROVIDER NOTE - MEDICAL DECISION MAKING DETAILS
54yo F with lower back pain, normal neuro exam, will treat symptomatically and reassess 52yo F with lower back pain, radiating to lower abd - msk vs stone

## 2018-12-31 NOTE — ED PROVIDER NOTE - NSFOLLOWUPINSTRUCTIONS_ED_ALL_ED_FT
please follow up with your primary care doctor in 1-2 days  Take keflex twice a day for urinary tract infection  Return to ED for worsening symptoms, fevers, chills, nausea, vomiting or other concerns  Take tylenol as directed for pain

## 2019-01-02 LAB
BACTERIA UR CULT: SIGNIFICANT CHANGE UP
SPECIMEN SOURCE: SIGNIFICANT CHANGE UP

## 2019-01-14 ENCOUNTER — TRANSCRIPTION ENCOUNTER (OUTPATIENT)
Age: 54
End: 2019-01-14

## 2019-11-05 ENCOUNTER — RESULT REVIEW (OUTPATIENT)
Age: 54
End: 2019-11-05

## 2020-03-26 ENCOUNTER — EMERGENCY (EMERGENCY)
Facility: HOSPITAL | Age: 55
LOS: 1 days | Discharge: HOME | End: 2020-03-26
Attending: STUDENT IN AN ORGANIZED HEALTH CARE EDUCATION/TRAINING PROGRAM | Admitting: STUDENT IN AN ORGANIZED HEALTH CARE EDUCATION/TRAINING PROGRAM
Payer: COMMERCIAL

## 2020-03-26 VITALS
DIASTOLIC BLOOD PRESSURE: 78 MMHG | TEMPERATURE: 100 F | RESPIRATION RATE: 18 BRPM | HEART RATE: 98 BPM | SYSTOLIC BLOOD PRESSURE: 150 MMHG | OXYGEN SATURATION: 96 %

## 2020-03-26 VITALS — HEART RATE: 97 BPM | OXYGEN SATURATION: 96 % | RESPIRATION RATE: 16 BRPM

## 2020-03-26 DIAGNOSIS — Z98.890 OTHER SPECIFIED POSTPROCEDURAL STATES: Chronic | ICD-10-CM

## 2020-03-26 DIAGNOSIS — Z98.51 TUBAL LIGATION STATUS: Chronic | ICD-10-CM

## 2020-03-26 DIAGNOSIS — Z87.2 PERSONAL HISTORY OF DISEASES OF THE SKIN AND SUBCUTANEOUS TISSUE: Chronic | ICD-10-CM

## 2020-03-26 PROCEDURE — 71045 X-RAY EXAM CHEST 1 VIEW: CPT | Mod: 26

## 2020-03-26 PROCEDURE — 99283 EMERGENCY DEPT VISIT LOW MDM: CPT

## 2020-03-26 RX ORDER — CLARITHROMYCIN 500 MG
1 TABLET ORAL
Qty: 6 | Refills: 0
Start: 2020-03-26 | End: 2020-03-30

## 2020-03-26 RX ORDER — ACETAMINOPHEN 500 MG
975 TABLET ORAL ONCE
Refills: 0 | Status: DISCONTINUED | OUTPATIENT
Start: 2020-03-26 | End: 2020-03-26

## 2020-03-26 RX ORDER — ALBUTEROL 90 UG/1
1 AEROSOL, METERED ORAL ONCE
Refills: 0 | Status: COMPLETED | OUTPATIENT
Start: 2020-03-26 | End: 2020-03-26

## 2020-03-26 RX ORDER — ACETAMINOPHEN 500 MG
650 TABLET ORAL ONCE
Refills: 0 | Status: COMPLETED | OUTPATIENT
Start: 2020-03-26 | End: 2020-03-26

## 2020-03-26 RX ADMIN — Medication 650 MILLIGRAM(S): at 19:43

## 2020-03-26 RX ADMIN — ALBUTEROL 1 PUFF(S): 90 AEROSOL, METERED ORAL at 20:17

## 2020-03-26 RX ADMIN — Medication 200 MILLIGRAM(S): at 20:17

## 2020-03-26 NOTE — ED PROVIDER NOTE - PROGRESS NOTE DETAILS
OMAR Mariee- Patient was received at sign out from OMAR Davis pending cxr. CXR normal. Pt reassessed by MD villegas, ambulatory without becoming hypoxic. States patient stable for DC home  and given COVID instructions. Pt understood and agreed with plan. All questions and concerns addressed. Strict return instructions given.

## 2020-03-26 NOTE — ED PROVIDER NOTE - OBJECTIVE STATEMENT
55 yo female c pmhx asthma, DM presents to ED c/o chest tightness, sob, cough, fever x 1 day.  Pt has taken tylenol at 11 pm.  Doesn't take anything for her asthma.   has same symptoms.  +diarrhea.  Denies any abd pain, urinary sx, recent travel.

## 2020-03-26 NOTE — ED PROVIDER NOTE - ATTENDING CONTRIBUTION TO CARE
54F h/o mild asthma (only rescue inhaler), DM p/w chest tightness, sob, cough, fever x 1-2 day. She and  traveled to [place] 4 days ago.  has same symptoms, though began 2 days earlier. +diarrhea. Cough, non-productive. Denies cp, abd pain, n/v.  Gen: nad  CV: rrr, no murmur  Pulm: cta b/l s/p albuterol MDI; ambulatory sat 96% with no signs of resp distress   Abd: soft, nt, nd  Ext: no edema  MDM: 54F h/o asthma pw chest tightness, sob, cough and fevers c/w viral illness given recent travel and  with same symptoms - pt with wheeze on initial exam by PA, now clear after receiving MDI. Stable for dc at this time. Return to ED and isolation instructions provided.

## 2020-03-26 NOTE — ED PROVIDER NOTE - NSFOLLOWUPINSTRUCTIONS_ED_ALL_ED_FT
Patient advised to follow up with or at least touch base with your primary care doctor within week and told to return to the emergency department immediately for any new or concerning symptoms OR ANY OTHER COMPLAINTS. Patient agrees with plan.      -- Please avoid contact with others while you are symptomatic and ideally two weeks.  -- This is likely a virus.  There is no direct treatment for a virus, and antibiotics are not effective.     -- Rest, increase your fluid intake and avoid dehydration.  -- It is very important to be diligent about hand washing & hygiene to avoid spreading the illness to others.  -- If you are having any worsening shortness of breath, please see your doctor or return to the Emergency Department.  -- Please continue taking your home medications as directed.     Why didn’t I get tested for novel coronavirus (COVID-19)?    The number of available tests is very limited so strict rules exist for who is allowed to be tested. [facility] has been authorized to perform testing and is currently working hard to be able to start providing the test. Such testing is currently reserved for patients who have had contact with someone infected with the virus, or those who are very sick a plus those who have traveled to areas identified by the Centers for Disease Control and Prevention (CD) and will require hospitalization.     What should I do now?    If you are well enough to be discharged home and are not in a high risk group to have contracted the COVID-19, you should care for yourself at home exactly like you would if you have Influenza “flu”. Follow all the standard guidelines about washing your hands, covering your cough, etc. You should return to the Emergency Department if you develop worse symptoms, trouble breathing, chest pain, and/or a fever that doesn’t improve with over the counter acetaminophen or ibuprofen.

## 2020-03-26 NOTE — ED PROVIDER NOTE - PATIENT PORTAL LINK FT
You can access the FollowMyHealth Patient Portal offered by [facility] by registering at the following website: [email or web address]. By joining [facility]’s FollowMyHealth portal, you will also be able to view your health information using other applications (apps) compatible with our system.

## 2020-03-26 NOTE — ED PROVIDER NOTE - CLINICAL SUMMARY MEDICAL DECISION MAKING FREE TEXT BOX
55 yo female c pmhx asthma, DM presents to ED c/o chest tightness, sob, cough, fever x 1 day. +expiratory wheezing noted, on ambulation o2 sat 94%; will get cxr

## 2020-03-28 ENCOUNTER — INPATIENT (INPATIENT)
Facility: HOSPITAL | Age: 55
LOS: 9 days | Discharge: HOME | End: 2020-04-07
Attending: HOSPITALIST | Admitting: HOSPITALIST
Payer: COMMERCIAL

## 2020-03-28 VITALS
HEART RATE: 103 BPM | OXYGEN SATURATION: 100 % | RESPIRATION RATE: 18 BRPM | TEMPERATURE: 101 F | SYSTOLIC BLOOD PRESSURE: 138 MMHG | DIASTOLIC BLOOD PRESSURE: 74 MMHG

## 2020-03-28 DIAGNOSIS — Z98.51 TUBAL LIGATION STATUS: Chronic | ICD-10-CM

## 2020-03-28 DIAGNOSIS — Z87.2 PERSONAL HISTORY OF DISEASES OF THE SKIN AND SUBCUTANEOUS TISSUE: Chronic | ICD-10-CM

## 2020-03-28 DIAGNOSIS — Z98.890 OTHER SPECIFIED POSTPROCEDURAL STATES: Chronic | ICD-10-CM

## 2020-03-28 NOTE — ED ADULT TRIAGE NOTE - CHIEF COMPLAINT QUOTE
Brought from home by EMS for flu-like sx.  Her  is currently in the ED being seen for similar symptoms.  She is experiencing cough and fever for 2 days, worse today.  She was seen in the ED on 03/26 for similar sx and discharged home.  History of Asthma and HTN.

## 2020-03-29 DIAGNOSIS — R09.02 HYPOXEMIA: ICD-10-CM

## 2020-03-29 DIAGNOSIS — E11.9 TYPE 2 DIABETES MELLITUS WITHOUT COMPLICATIONS: ICD-10-CM

## 2020-03-29 DIAGNOSIS — Z20.828 CONTACT WITH AND (SUSPECTED) EXPOSURE TO OTHER VIRAL COMMUNICABLE DISEASES: ICD-10-CM

## 2020-03-29 DIAGNOSIS — I10 ESSENTIAL (PRIMARY) HYPERTENSION: ICD-10-CM

## 2020-03-29 DIAGNOSIS — J18.9 PNEUMONIA, UNSPECIFIED ORGANISM: ICD-10-CM

## 2020-03-29 DIAGNOSIS — J45.901 UNSPECIFIED ASTHMA WITH (ACUTE) EXACERBATION: ICD-10-CM

## 2020-03-29 LAB
ALBUMIN SERPL ELPH-MCNC: 4 G/DL — SIGNIFICANT CHANGE UP (ref 3.3–5)
ALP SERPL-CCNC: 69 U/L — SIGNIFICANT CHANGE UP (ref 40–120)
ALT FLD-CCNC: 27 U/L — SIGNIFICANT CHANGE UP (ref 4–33)
ANION GAP SERPL CALC-SCNC: 16 MMO/L — HIGH (ref 7–14)
AST SERPL-CCNC: 34 U/L — HIGH (ref 4–32)
BASOPHILS # BLD AUTO: 0.01 K/UL — SIGNIFICANT CHANGE UP (ref 0–0.2)
BASOPHILS NFR BLD AUTO: 0.2 % — SIGNIFICANT CHANGE UP (ref 0–2)
BILIRUB SERPL-MCNC: 0.3 MG/DL — SIGNIFICANT CHANGE UP (ref 0.2–1.2)
BUN SERPL-MCNC: 10 MG/DL — SIGNIFICANT CHANGE UP (ref 7–23)
CALCIUM SERPL-MCNC: 9 MG/DL — SIGNIFICANT CHANGE UP (ref 8.4–10.5)
CHLORIDE SERPL-SCNC: 94 MMOL/L — LOW (ref 98–107)
CO2 SERPL-SCNC: 25 MMOL/L — SIGNIFICANT CHANGE UP (ref 22–31)
CREAT SERPL-MCNC: 0.73 MG/DL — SIGNIFICANT CHANGE UP (ref 0.5–1.3)
D DIMER BLD IA.RAPID-MCNC: 175 NG/ML — SIGNIFICANT CHANGE UP
EOSINOPHIL # BLD AUTO: 0 K/UL — SIGNIFICANT CHANGE UP (ref 0–0.5)
EOSINOPHIL NFR BLD AUTO: 0 % — SIGNIFICANT CHANGE UP (ref 0–6)
GLUCOSE SERPL-MCNC: 369 MG/DL — HIGH (ref 70–99)
HCT VFR BLD CALC: 40.1 % — SIGNIFICANT CHANGE UP (ref 34.5–45)
HGB BLD-MCNC: 12.8 G/DL — SIGNIFICANT CHANGE UP (ref 11.5–15.5)
IMM GRANULOCYTES NFR BLD AUTO: 0.4 % — SIGNIFICANT CHANGE UP (ref 0–1.5)
LIDOCAIN IGE QN: 28.2 U/L — SIGNIFICANT CHANGE UP (ref 7–60)
LYMPHOCYTES # BLD AUTO: 1.06 K/UL — SIGNIFICANT CHANGE UP (ref 1–3.3)
LYMPHOCYTES # BLD AUTO: 23.2 % — SIGNIFICANT CHANGE UP (ref 13–44)
MCHC RBC-ENTMCNC: 27 PG — SIGNIFICANT CHANGE UP (ref 27–34)
MCHC RBC-ENTMCNC: 31.9 % — LOW (ref 32–36)
MCV RBC AUTO: 84.6 FL — SIGNIFICANT CHANGE UP (ref 80–100)
MONOCYTES # BLD AUTO: 0.23 K/UL — SIGNIFICANT CHANGE UP (ref 0–0.9)
MONOCYTES NFR BLD AUTO: 5 % — SIGNIFICANT CHANGE UP (ref 2–14)
NEUTROPHILS # BLD AUTO: 3.25 K/UL — SIGNIFICANT CHANGE UP (ref 1.8–7.4)
NEUTROPHILS NFR BLD AUTO: 71.2 % — SIGNIFICANT CHANGE UP (ref 43–77)
NRBC # FLD: 0 K/UL — SIGNIFICANT CHANGE UP (ref 0–0)
PLATELET # BLD AUTO: 223 K/UL — SIGNIFICANT CHANGE UP (ref 150–400)
PMV BLD: 9.9 FL — SIGNIFICANT CHANGE UP (ref 7–13)
POTASSIUM SERPL-MCNC: 4.2 MMOL/L — SIGNIFICANT CHANGE UP (ref 3.5–5.3)
POTASSIUM SERPL-SCNC: 4.2 MMOL/L — SIGNIFICANT CHANGE UP (ref 3.5–5.3)
PROT SERPL-MCNC: 7.9 G/DL — SIGNIFICANT CHANGE UP (ref 6–8.3)
RBC # BLD: 4.74 M/UL — SIGNIFICANT CHANGE UP (ref 3.8–5.2)
RBC # FLD: 13.1 % — SIGNIFICANT CHANGE UP (ref 10.3–14.5)
SARS-COV-2 RNA SPEC QL NAA+PROBE: DETECTED
SODIUM SERPL-SCNC: 135 MMOL/L — SIGNIFICANT CHANGE UP (ref 135–145)
WBC # BLD: 4.57 K/UL — SIGNIFICANT CHANGE UP (ref 3.8–10.5)
WBC # FLD AUTO: 4.57 K/UL — SIGNIFICANT CHANGE UP (ref 3.8–10.5)

## 2020-03-29 PROCEDURE — 93971 EXTREMITY STUDY: CPT | Mod: 26,LT

## 2020-03-29 PROCEDURE — 76705 ECHO EXAM OF ABDOMEN: CPT | Mod: 26

## 2020-03-29 PROCEDURE — 71045 X-RAY EXAM CHEST 1 VIEW: CPT | Mod: 26

## 2020-03-29 PROCEDURE — 99223 1ST HOSP IP/OBS HIGH 75: CPT | Mod: AI

## 2020-03-29 RX ORDER — INSULIN LISPRO 100/ML
VIAL (ML) SUBCUTANEOUS
Refills: 0 | Status: DISCONTINUED | OUTPATIENT
Start: 2020-03-29 | End: 2020-03-30

## 2020-03-29 RX ORDER — METFORMIN HYDROCHLORIDE 850 MG/1
1 TABLET ORAL
Qty: 0 | Refills: 0 | DISCHARGE

## 2020-03-29 RX ORDER — ACETAMINOPHEN 500 MG
650 TABLET ORAL ONCE
Refills: 0 | Status: COMPLETED | OUTPATIENT
Start: 2020-03-29 | End: 2020-03-29

## 2020-03-29 RX ORDER — DEXTROSE 50 % IN WATER 50 %
25 SYRINGE (ML) INTRAVENOUS ONCE
Refills: 0 | Status: DISCONTINUED | OUTPATIENT
Start: 2020-03-29 | End: 2020-04-07

## 2020-03-29 RX ORDER — INSULIN GLARGINE 100 [IU]/ML
55 INJECTION, SOLUTION SUBCUTANEOUS
Qty: 0 | Refills: 0 | DISCHARGE

## 2020-03-29 RX ORDER — SODIUM CHLORIDE 9 MG/ML
500 INJECTION INTRAMUSCULAR; INTRAVENOUS; SUBCUTANEOUS ONCE
Refills: 0 | Status: COMPLETED | OUTPATIENT
Start: 2020-03-29 | End: 2020-03-29

## 2020-03-29 RX ORDER — ONDANSETRON 8 MG/1
4 TABLET, FILM COATED ORAL ONCE
Refills: 0 | Status: COMPLETED | OUTPATIENT
Start: 2020-03-29 | End: 2020-03-29

## 2020-03-29 RX ORDER — GLUCAGON INJECTION, SOLUTION 0.5 MG/.1ML
1 INJECTION, SOLUTION SUBCUTANEOUS ONCE
Refills: 0 | Status: DISCONTINUED | OUTPATIENT
Start: 2020-03-29 | End: 2020-04-07

## 2020-03-29 RX ORDER — ETHACRYNIC ACID 25 MG/1
25 TABLET ORAL ONCE
Refills: 0 | Status: COMPLETED | OUTPATIENT
Start: 2020-03-29 | End: 2020-03-29

## 2020-03-29 RX ORDER — CEFTRIAXONE 500 MG/1
1000 INJECTION, POWDER, FOR SOLUTION INTRAMUSCULAR; INTRAVENOUS EVERY 24 HOURS
Refills: 0 | Status: COMPLETED | OUTPATIENT
Start: 2020-03-30 | End: 2020-04-03

## 2020-03-29 RX ORDER — INSULIN GLARGINE 100 [IU]/ML
40 INJECTION, SOLUTION SUBCUTANEOUS AT BEDTIME
Refills: 0 | Status: DISCONTINUED | OUTPATIENT
Start: 2020-03-29 | End: 2020-03-30

## 2020-03-29 RX ORDER — ENOXAPARIN SODIUM 100 MG/ML
40 INJECTION SUBCUTANEOUS DAILY
Refills: 0 | Status: DISCONTINUED | OUTPATIENT
Start: 2020-03-29 | End: 2020-04-07

## 2020-03-29 RX ORDER — DEXTROSE 50 % IN WATER 50 %
12.5 SYRINGE (ML) INTRAVENOUS ONCE
Refills: 0 | Status: DISCONTINUED | OUTPATIENT
Start: 2020-03-29 | End: 2020-04-07

## 2020-03-29 RX ORDER — ALBUTEROL 90 UG/1
2 AEROSOL, METERED ORAL EVERY 6 HOURS
Refills: 0 | Status: DISCONTINUED | OUTPATIENT
Start: 2020-03-29 | End: 2020-04-07

## 2020-03-29 RX ORDER — AZITHROMYCIN 500 MG/1
500 TABLET, FILM COATED ORAL DAILY
Refills: 0 | Status: DISCONTINUED | OUTPATIENT
Start: 2020-03-29 | End: 2020-03-30

## 2020-03-29 RX ORDER — AMLODIPINE BESYLATE 2.5 MG/1
1 TABLET ORAL
Qty: 0 | Refills: 0 | DISCHARGE

## 2020-03-29 RX ORDER — FAMOTIDINE 10 MG/ML
20 INJECTION INTRAVENOUS ONCE
Refills: 0 | Status: COMPLETED | OUTPATIENT
Start: 2020-03-29 | End: 2020-03-29

## 2020-03-29 RX ORDER — DEXTROSE 50 % IN WATER 50 %
15 SYRINGE (ML) INTRAVENOUS ONCE
Refills: 0 | Status: DISCONTINUED | OUTPATIENT
Start: 2020-03-29 | End: 2020-04-07

## 2020-03-29 RX ORDER — INSULIN LISPRO 100/ML
4 VIAL (ML) SUBCUTANEOUS ONCE
Refills: 0 | Status: COMPLETED | OUTPATIENT
Start: 2020-03-29 | End: 2020-03-29

## 2020-03-29 RX ORDER — ALBUTEROL 90 UG/1
2 AEROSOL, METERED ORAL ONCE
Refills: 0 | Status: COMPLETED | OUTPATIENT
Start: 2020-03-29 | End: 2020-03-29

## 2020-03-29 RX ORDER — IBUPROFEN 200 MG
3 TABLET ORAL
Qty: 0 | Refills: 0 | DISCHARGE

## 2020-03-29 RX ORDER — AMLODIPINE BESYLATE 2.5 MG/1
10 TABLET ORAL DAILY
Refills: 0 | Status: DISCONTINUED | OUTPATIENT
Start: 2020-03-29 | End: 2020-04-07

## 2020-03-29 RX ORDER — INSULIN LISPRO 100/ML
VIAL (ML) SUBCUTANEOUS AT BEDTIME
Refills: 0 | Status: DISCONTINUED | OUTPATIENT
Start: 2020-03-29 | End: 2020-04-07

## 2020-03-29 RX ORDER — CEFTRIAXONE 500 MG/1
1000 INJECTION, POWDER, FOR SOLUTION INTRAMUSCULAR; INTRAVENOUS ONCE
Refills: 0 | Status: COMPLETED | OUTPATIENT
Start: 2020-03-29 | End: 2020-03-29

## 2020-03-29 RX ORDER — INFLUENZA VIRUS VACCINE 15; 15; 15; 15 UG/.5ML; UG/.5ML; UG/.5ML; UG/.5ML
0.5 SUSPENSION INTRAMUSCULAR ONCE
Refills: 0 | Status: DISCONTINUED | OUTPATIENT
Start: 2020-03-29 | End: 2020-04-07

## 2020-03-29 RX ORDER — ASCORBIC ACID 60 MG
100 TABLET,CHEWABLE ORAL DAILY
Refills: 0 | Status: DISCONTINUED | OUTPATIENT
Start: 2020-03-29 | End: 2020-03-31

## 2020-03-29 RX ORDER — THIAMINE MONONITRATE (VIT B1) 100 MG
200 TABLET ORAL DAILY
Refills: 0 | Status: DISCONTINUED | OUTPATIENT
Start: 2020-03-29 | End: 2020-04-07

## 2020-03-29 RX ORDER — SODIUM CHLORIDE 9 MG/ML
1000 INJECTION, SOLUTION INTRAVENOUS
Refills: 0 | Status: DISCONTINUED | OUTPATIENT
Start: 2020-03-29 | End: 2020-04-07

## 2020-03-29 RX ORDER — LIRAGLUTIDE 6 MG/ML
1 INJECTION SUBCUTANEOUS
Qty: 0 | Refills: 0 | DISCHARGE

## 2020-03-29 RX ORDER — ROSUVASTATIN CALCIUM 5 MG/1
1 TABLET ORAL
Qty: 0 | Refills: 0 | DISCHARGE

## 2020-03-29 RX ADMIN — Medication 650 MILLIGRAM(S): at 17:59

## 2020-03-29 RX ADMIN — ETHACRYNIC ACID 25 MILLIGRAM(S): 25 TABLET ORAL at 19:18

## 2020-03-29 RX ADMIN — SODIUM CHLORIDE 250 MILLILITER(S): 9 INJECTION INTRAMUSCULAR; INTRAVENOUS; SUBCUTANEOUS at 03:27

## 2020-03-29 RX ADMIN — INSULIN GLARGINE 40 UNIT(S): 100 INJECTION, SOLUTION SUBCUTANEOUS at 23:44

## 2020-03-29 RX ADMIN — Medication 20 MILLIGRAM(S): at 17:50

## 2020-03-29 RX ADMIN — AZITHROMYCIN 500 MILLIGRAM(S): 500 TABLET, FILM COATED ORAL at 19:18

## 2020-03-29 RX ADMIN — FAMOTIDINE 20 MILLIGRAM(S): 10 INJECTION INTRAVENOUS at 03:26

## 2020-03-29 RX ADMIN — ENOXAPARIN SODIUM 40 MILLIGRAM(S): 100 INJECTION SUBCUTANEOUS at 19:18

## 2020-03-29 RX ADMIN — ONDANSETRON 4 MILLIGRAM(S): 8 TABLET, FILM COATED ORAL at 03:27

## 2020-03-29 RX ADMIN — AMLODIPINE BESYLATE 10 MILLIGRAM(S): 2.5 TABLET ORAL at 17:51

## 2020-03-29 RX ADMIN — Medication 650 MILLIGRAM(S): at 18:09

## 2020-03-29 RX ADMIN — ALBUTEROL 2 PUFF(S): 90 AEROSOL, METERED ORAL at 03:27

## 2020-03-29 RX ADMIN — CEFTRIAXONE 100 MILLIGRAM(S): 500 INJECTION, POWDER, FOR SOLUTION INTRAMUSCULAR; INTRAVENOUS at 17:52

## 2020-03-29 RX ADMIN — Medication 200 MILLIGRAM(S): at 17:52

## 2020-03-29 RX ADMIN — Medication 4 UNIT(S): at 05:02

## 2020-03-29 RX ADMIN — Medication 650 MILLIGRAM(S): at 03:26

## 2020-03-29 RX ADMIN — Medication 3: at 19:18

## 2020-03-29 RX ADMIN — ALBUTEROL 2 PUFF(S): 90 AEROSOL, METERED ORAL at 19:19

## 2020-03-29 NOTE — H&P ADULT - ASSESSMENT
53 yo hld, htn, dm, asthma BIBEMS,  with 1 week of sob, cough, cp with cough, and more recently nausea, fever.    admitted for similar symptoms. of note also had recent trip to/from [place] (drove). denies etoh and tobacco. no hx of vte or cad. lmp 2 years ago   She was seen in the ED on 03/26 for similar sx and discharged home.    patient not well x 5 days  pos cough with clear phegm, pos fever x 5 days, pos sob  po nausea and diarrhea- low back pain no blood in vomit or diarrhea  Patient seen in ED- very hungry wants food.  < from: Xray Chest 1 View- PORTABLE-Urgent (03.29.20 @ 05:11) >  IMPRESSION:  Leftbasilar opacity worrisome for pneumonia. No pneumothorax. The cardiomediastinal silhouette is poorly evaluated on this projection. Degenerative changes of the spine.      #SARS sec to covid19 most likely with hypoxia with L PNA bact vs viral- most likely viral- start vit c po and thiamine po and maalox  # elevated glucose- restart insulins  # diarrhea- Observe-   # Hypoxia - monitor Oxygen on pulse ox 53 yo hld, htn, dm, asthma BIBEMS,  with 1 week of sob, cough, cp with cough, and more recently nausea, fever.    admitted for similar symptoms. of note also had recent trip to/from [place] (drove). denies etoh and tobacco. no hx of vte or cad. lmp 2 years ago   She was seen in the ED on 03/26 for similar sx and discharged home.    patient not well x 5 days  pos cough with clear phegm, pos fever x 5 days, pos sob  po nausea and diarrhea- low back pain no blood in vomit or diarrhea  Patient seen in ED- very hungry wants food.  < from: Xray Chest 1 View- PORTABLE-Urgent (03.29.20 @ 05:11) >  IMPRESSION:  Leftbasilar opacity worrisome for pneumonia. No pneumothorax. The cardiomediastinal silhouette is poorly evaluated on this projection. Degenerative changes of the spine.      #SARS sec to covid19 most likely with hypoxia with L PNA bact vs viral- most likely viral- start vit c po and thiamine po and maalox  # elevated glucose- restart insulins  # diarrhea- Observe-   # Hypoxia - monitor Oxygen on pulse ox  # Le edema B/l - doppleres neg- Lasix 20 mg po x1 55 yo hld, htn, dm, asthma BIBEMS,  with 1 week of sob, cough, cp with cough, and more recently nausea, fever.    admitted for similar symptoms. of note also had recent trip to/from [place] (drove). denies etoh and tobacco. no hx of vte or cad. lmp 2 years ago   She was seen in the ED on 03/26 for similar sx and discharged home.    patient not well x 5 days  pos cough with clear phegm, pos fever x 5 days, pos sob  po nausea and diarrhea- low back pain no blood in vomit or diarrhea  Patient seen in ED- very hungry wants food.  < from: Xray Chest 1 View- PORTABLE-Urgent (03.29.20 @ 05:11) >  IMPRESSION:  Leftbasilar opacity worrisome for pneumonia. No pneumothorax. The cardiomediastinal silhouette is poorly evaluated on this projection. Degenerative changes of the spine.      #SARS sec to covid19 most likely with hypoxia with L PNA bact vs viral- most likely viral- start vit c po and thiamine po and maalox  # elevated glucose- restart insulins  # diarrhea- Observe-   # Hypoxia - monitor Oxygen on pulse ox  # Le edema B/l - doppleres neg- ethacrinic acid mg po x1

## 2020-03-29 NOTE — ED PROVIDER NOTE - NS ED ROS FT
Constitutional: + fever. + chills.   Eyes: No visual changes, eye pain or redness  HEENT: No throat pain, hearing changes, ear pain, nasal pain.  CV: + chest pain, no palpitations  Resp: + shortness of breath, + cough  GI: no abdominal pain. + nausea. no  vomiting. No diarrhea. No constipation.   : No dysuria, hematuria. no urinary frequency, no urinary urgency.  MSK: No musculoskeletal pain  Skin: No rash, lesions, no bruises  Neuro: + headache (mild intermittent). No numbness or tingling. No weakness. No dizziness.  Allergy/Immunology: allergy to sulfa

## 2020-03-29 NOTE — H&P ADULT - NSHPLABSRESULTS_GEN_ALL_CORE
LABS:                        12.8   4.57  )-----------( 223      ( 29 Mar 2020 03:31 )             40.1     03-29    135  |  94<L>  |  10  ----------------------------<  369<H>  4.2   |  25  |  0.73    Ca    9.0      29 Mar 2020 03:31    TPro  7.9  /  Alb  4.0  /  TBili  0.3  /  DBili  x   /  AST  34<H>  /  ALT  27  /  AlkPhos  69  03-29        RADIOLOGY & ADDITIONAL TESTS:  < from: Xray Chest 1 View- PORTABLE-Urgent (03.29.20 @ 05:11) >  IMPRESSION:  Leftbasilar opacity worrisome for pneumonia. No pneumothorax. The cardiomediastinal silhouette is poorly evaluated on this projection. Degenerative changes of the spine.

## 2020-03-29 NOTE — ED PROVIDER NOTE - OBJECTIVE STATEMENT
54 hld, htn, dm, asthma with 1 week of sob, cough, cp with cough, and more recently nausea, fever.  admitted for similar symptoms. of note also had recent trip to/from [place] (drove). denies etoh and tobacco. no hx of vte or cad. lmp 2 years ago

## 2020-03-29 NOTE — ED PROVIDER NOTE - PHYSICAL EXAMINATION
GEN: sob with speech. nontoxic on 3L nc  HEENT: NCAT. face symmetrical. PERRL 4mm, EOMI,   Neck: no JVD, trachea midline, no LAD, from, no tenderness  CV: mild tachy RR. +S1S2, no murmur. 2+ pulses in 4 extremities, cap refill <2 sec  Chest: diffuse crackles. no wheezing, rhonchi. no retractions. decreased air movement.   ABD: +BS, soft, +ruq/epigastric tenderness. No lesions  : no cva or suprapubic tenderness  MSK: No clubbing, cyanosis, edema. FROM of all extremities. no tenderness to palpation. No midline or paraspinal tenderness.   Neuro: AAOX3. Sensation intact, motor 5/5 throughout.   SKIN: No rash

## 2020-03-29 NOTE — H&P ADULT - NSHPPHYSICALEXAM_GEN_ALL_CORE
Vital Signs Last 24 Hrs  T(C): 37.3 (29 Mar 2020 09:40), Max: 38.6 (28 Mar 2020 23:32)  T(F): 99.2 (29 Mar 2020 09:40), Max: 101.4 (28 Mar 2020 23:32)  HR: 88 (29 Mar 2020 09:40) (88 - 103)  BP: 130/59 (29 Mar 2020 09:40) (130/59 - 138/74)  BP(mean): --  RR: 20 (29 Mar 2020 09:40) (18 - 20)  SpO2: 100% (29 Mar 2020 09:40) (90% - 100%)      PHYSICAL EXAM:  GENERAL: mild distress sec to SOB, obses lady  HEAD:  Atraumatic, Normocephalic  EYES: EOMI, PERRLA, conjunctiva and sclera clear  NECK: Supple, no JVD  CHEST/LUNG: Clear to auscultation bilaterally; tiny wheeze  HEART: Regular rate and rhythm; no murmurs, rubs, or gallops  ABDOMEN: Soft, Nontender, Nondistended; Bowel sounds present  EXTREMITIES:  warm and well perfused, no clubbing, cyanosis,   B/l le edema 1 plus  PSYCH: AAOx3  NEUROLOGY: non-focal  SKIN: no rashes or lesions

## 2020-03-29 NOTE — ED PROVIDER NOTE - CLINICAL SUMMARY MEDICAL DECISION MAKING FREE TEXT BOX
Patient seen during COVID19 pandemic. URI but also recent travel and ruq pain. hypoxic to 90% on RA, 100% on 3l nc. suspect all related to covid but ddx does include vte and biliary disease. plan labs, us cxr, symptom relief. tba.

## 2020-03-29 NOTE — H&P ADULT - ATTENDING COMMENTS
Brought from home by EMS for flu-like sx.  Her  is currently in the ED being seen for similar symptoms.  She is experiencing cough and fever for 2 days, worse today.  She was seen in the ED on 03/26 for similar sx and discharged home.  History of Asthma and HTN.  r/o Covid SARS sec to covid19 most likely with hypoxia SARS sec to covid19 most likely with hypoxia  #SARS sec to covid19 most likely with hypoxia with L PNA bact vs viral- most likely viral- start vit c po and thiamine po and maalox  # elevated glucose- restart insulins  # diarrhea- Observe-   # Hypoxia - monitor Oxygen on pulse ox  # Le edema B/l - doppleres neg- Lasix 20 mg po x1 SARS sec to covid19 most likely with hypoxia  #SARS sec to covid19 most likely with hypoxia with L PNA bact vs viral- most likely viral- start vit c po and thiamine po and maalox  # elevated glucose- restart insulins  # diarrhea- Observe-   # Hypoxia - monitor Oxygen on pulse ox  # Le edema B/l - doppleres neg- ethacrinic acid 25 mg po x1

## 2020-03-29 NOTE — H&P ADULT - HISTORY OF PRESENT ILLNESS
54 hld, htn, dm, asthma BIBEMS,  with 1 week of sob, cough, cp with cough, and more recently nausea, fever.    admitted for similar symptoms. of note also had recent trip to/from [place] (drove). denies etoh and tobacco. no hx of vte or cad. lmp 2 years ago   She was seen in the ED on 03/26 for similar sx and discharged home. 53 yo hld, htn, dm, asthma BIBEMS,  with 1 week of sob, cough, cp with cough, and more recently nausea, fever.    admitted for similar symptoms. of note also had recent trip to/from [place] (drove). denies etoh and tobacco. no hx of vte or cad. lmp 2 years ago   She was seen in the ED on 03/26 for similar sx and discharged home.    patient not well x 5 days  pos cough with clear phegm  pos fever x 5 days  pos sob  po nausea and diarrhea- low back pain no blood in vomit or diarrhea 55 yo hld, htn, dm, asthma BIBEMS,  with 1 week of sob, cough, cp with cough, and more recently nausea, fever.    admitted for similar symptoms. of note also had recent trip to/from [place] (drove). denies etoh and tobacco. no hx of vte or cad. lmp 2 years ago   She was seen in the ED on 03/26 for similar sx and discharged home.    patient not well x 5 days  pos cough with clear phegm  pos fever x 5 days  pos sob  po nausea and diarrhea- low back pain no blood in vomit or diarrhea  Patient seen in ED- very hungry wants food.  < from: Xray Chest 1 View- PORTABLE-Urgent (03.29.20 @ 05:11) >  IMPRESSION:  Leftbasilar opacity worrisome for pneumonia. No pneumothorax. The cardiomediastinal silhouette is poorly evaluated on this projection. Degenerative changes of the spine.

## 2020-03-29 NOTE — ED ADULT NURSE NOTE - OBJECTIVE STATEMENT
Pt is a 54 year old female reporting to the ED for sob fever, cough for 1 week. Pt report \s  with similar sxs. Pt reports nausea, fever for last "few days". PT spo2 89 on room air, placed on 4 L nasal cannula spo2 100 %. Pt respirations tachypneic. Pt is AOX4. Pt denies chest pain . Pt denies  n/v/d at this time.  Pt denies abdominal pain, dysuria, hematuria. 18 g iv placed in left wrist, labs drawn, pending review, will continue to monitor.

## 2020-03-29 NOTE — H&P ADULT - PROBLEM/PLAN-2

## 2020-03-29 NOTE — H&P ADULT - NSICDXPASTMEDICALHX_GEN_ALL_CORE_FT
PAST MEDICAL HISTORY:  Anemia     Asthma     Diabetes type 2    Fibroids     History of hypertension     Hypercholesterolemia     Seasonal allergies

## 2020-03-29 NOTE — ED PROVIDER NOTE - CARE PLAN
Principal Discharge DX:	Hypoxia  Secondary Diagnosis:	Epigastric pain  Secondary Diagnosis:	Leg pain

## 2020-03-29 NOTE — PATIENT PROFILE ADULT - DO YOU FEEL UNSAFE AT WORK?
Vital Signs Stable  Gen: well appearing, NAD  HEENT: PERRL, MMM, normal conjunctiva, anicteric, neck supple, TM clear & intact b/l, EAC non-erythematous, tonsils non-erythematous without exudate or plaque, no cervical lymphadenopathy  Neck supple  Cardiac: regular rate rhythm, normal S1S2  Chest: CTA BL, no wheeze or crackles  Abdomen: normal BS, soft, NT  Extremity: no gross deformity, good perfusion  Skin: no rash  Neuro: grossly normal, no ataxia, normal cerebellar testing. Vital Signs Stable  Gen: well appearing, NAD  HEENT: PERRL, MMM, normal conjunctiva, anicteric, neck supple, TM clear & intact b/l, EAC non-erythematous, tonsils non-erythematous without exudate or plaque, no cervical lymphadenopathy  Neck supple  Cardiac: regular rate rhythm, normal S1S2  Chest: CTA BL, no wheeze or crackles  Abdomen: normal BS, soft, NT  Extremity: no gross deformity, good perfusion  Skin: no rash  Neuro: full strength b/l, no focal deficits, no ataxia, normal cerebellar testing. Vital Signs Stable  Gen: well appearing, NAD  HEENT: PERRL, MMM, normal conjunctiva, anicteric, neck supple, TM clear & intact b/l, EAC non-erythematous, tonsils non-erythematous without exudate or plaque, no cervical lymphadenopathy  Neck supple  Cardiac: regular rate rhythm, normal S1S2  Chest: CTA BL, no wheeze or crackles  Abdomen: normal BS, soft, NT  Extremity: no gross deformity, good perfusion  Skin: no rash  Neuro: Normal MS; CNII-XII intact; power 5/5; sensation grossly intact; reflexes 2+; negative romberg; normal gait no

## 2020-03-29 NOTE — H&P ADULT - NSHPREVIEWOFSYSTEMS_GEN_ALL_CORE
CONSTITUTIONAL: POS fever and fatigue  EYES: No eye pain, visual disturbances, or discharge  ENMT:  No difficulty hearing, tinnitus, vertigo; No sinus or throat pain  NECK: No pain or stiffness  BREASTS: No pain, masses, or nipple discharge  RESPIRATORY: POS cough, NO wheezing, chills or hemoptysis;   POS  shortness of breath  CARDIOVASCULAR: No chest pain, palpitations, dizziness, or leg swelling  GASTROINTESTINAL: No abdominal or epigastric pain.   POS  nausea, vomiting, NO hematemesis;   POS diarrhea   NO constipation. No melena or hematochezia.  GENITOURINARY: No dysuria, frequency, hematuria, or incontinence  NEUROLOGICAL: No headaches, memory loss, loss of strength, numbness, or tremors  SKIN: No itching, burning, rashes, or lesions   LYMPH NODES: No enlarged glands  ENDOCRINE: No heat or cold intolerance; No hair loss  MUSCULOSKELETAL: No muscle or back pain  PSYCHIATRIC: No depression, anxiety, mood swings, or difficulty sleeping  HEME/LYMPH: No easy bruising, or bleeding gums  ALLERGY AND IMMUNOLOGIC: No hives or eczema

## 2020-03-29 NOTE — H&P ADULT - PROBLEM SELECTOR PLAN 5
albuterol hfa 2 puffs prn  oxygen via NC - mon sats call md <92 % albuterol hfa 2 puffs prn  oxygen via NC - mon sats call md <92 %  Vit c, Thiamine

## 2020-03-29 NOTE — H&P ADULT - NSICDXPASTSURGICALHX_GEN_ALL_CORE_FT
PAST SURGICAL HISTORY:  H/O knee surgery right meniscus repair 2014    H/O myomectomy 4/2009    History of breast abscess surgery, right 1984    S/P tubal ligation 2004

## 2020-03-30 DIAGNOSIS — J45.909 UNSPECIFIED ASTHMA, UNCOMPLICATED: ICD-10-CM

## 2020-03-30 DIAGNOSIS — B97.21 SARS-ASSOCIATED CORONAVIRUS AS THE CAUSE OF DISEASES CLASSIFIED ELSEWHERE: ICD-10-CM

## 2020-03-30 DIAGNOSIS — Z29.9 ENCOUNTER FOR PROPHYLACTIC MEASURES, UNSPECIFIED: ICD-10-CM

## 2020-03-30 DIAGNOSIS — I10 ESSENTIAL (PRIMARY) HYPERTENSION: ICD-10-CM

## 2020-03-30 DIAGNOSIS — E78.5 HYPERLIPIDEMIA, UNSPECIFIED: ICD-10-CM

## 2020-03-30 LAB
ANION GAP SERPL CALC-SCNC: 17 MMO/L — HIGH (ref 7–14)
APTT BLD: 30.8 SEC — SIGNIFICANT CHANGE UP (ref 27.5–36.3)
BUN SERPL-MCNC: 17 MG/DL — SIGNIFICANT CHANGE UP (ref 7–23)
CALCIUM SERPL-MCNC: 9 MG/DL — SIGNIFICANT CHANGE UP (ref 8.4–10.5)
CHLORIDE SERPL-SCNC: 92 MMOL/L — LOW (ref 98–107)
CK SERPL-CCNC: 192 U/L — HIGH (ref 25–170)
CO2 SERPL-SCNC: 25 MMOL/L — SIGNIFICANT CHANGE UP (ref 22–31)
CREAT SERPL-MCNC: 0.71 MG/DL — SIGNIFICANT CHANGE UP (ref 0.5–1.3)
D DIMER BLD IA.RAPID-MCNC: 219 NG/ML — SIGNIFICANT CHANGE UP
GLUCOSE SERPL-MCNC: 441 MG/DL — HIGH (ref 70–99)
HBA1C BLD-MCNC: 12.4 % — HIGH (ref 4–5.6)
HCV AB S/CO SERPL IA: 0.12 S/CO — SIGNIFICANT CHANGE UP (ref 0–0.99)
HCV AB SERPL-IMP: SIGNIFICANT CHANGE UP
INR BLD: 0.98 — SIGNIFICANT CHANGE UP (ref 0.88–1.17)
POTASSIUM SERPL-MCNC: 4.3 MMOL/L — SIGNIFICANT CHANGE UP (ref 3.5–5.3)
POTASSIUM SERPL-SCNC: 4.3 MMOL/L — SIGNIFICANT CHANGE UP (ref 3.5–5.3)
PROCALCITONIN SERPL-MCNC: 0.25 NG/ML — HIGH (ref 0.02–0.1)
PROTHROM AB SERPL-ACNC: 11.3 SEC — SIGNIFICANT CHANGE UP (ref 9.8–13.1)
SODIUM SERPL-SCNC: 134 MMOL/L — LOW (ref 135–145)
TROPONIN T, HIGH SENSITIVITY: < 6 NG/L — SIGNIFICANT CHANGE UP (ref ?–14)

## 2020-03-30 PROCEDURE — 93010 ELECTROCARDIOGRAM REPORT: CPT

## 2020-03-30 PROCEDURE — 99233 SBSQ HOSP IP/OBS HIGH 50: CPT

## 2020-03-30 RX ORDER — HYDROXYCHLOROQUINE SULFATE 200 MG
TABLET ORAL
Refills: 0 | Status: COMPLETED | OUTPATIENT
Start: 2020-03-30 | End: 2020-04-03

## 2020-03-30 RX ORDER — HYDROXYCHLOROQUINE SULFATE 200 MG
400 TABLET ORAL EVERY 12 HOURS
Refills: 0 | Status: COMPLETED | OUTPATIENT
Start: 2020-03-30 | End: 2020-03-30

## 2020-03-30 RX ORDER — HYDROXYCHLOROQUINE SULFATE 200 MG
200 TABLET ORAL EVERY 12 HOURS
Refills: 0 | Status: COMPLETED | OUTPATIENT
Start: 2020-03-31 | End: 2020-04-03

## 2020-03-30 RX ORDER — ATORVASTATIN CALCIUM 80 MG/1
40 TABLET, FILM COATED ORAL AT BEDTIME
Refills: 0 | Status: DISCONTINUED | OUTPATIENT
Start: 2020-03-30 | End: 2020-04-07

## 2020-03-30 RX ORDER — INSULIN GLARGINE 100 [IU]/ML
52 INJECTION, SOLUTION SUBCUTANEOUS AT BEDTIME
Refills: 0 | Status: DISCONTINUED | OUTPATIENT
Start: 2020-03-30 | End: 2020-03-31

## 2020-03-30 RX ORDER — INSULIN LISPRO 100/ML
VIAL (ML) SUBCUTANEOUS
Refills: 0 | Status: DISCONTINUED | OUTPATIENT
Start: 2020-03-30 | End: 2020-04-07

## 2020-03-30 RX ADMIN — Medication 200 MILLIGRAM(S): at 12:07

## 2020-03-30 RX ADMIN — Medication 400 MILLIGRAM(S): at 22:52

## 2020-03-30 RX ADMIN — INSULIN GLARGINE 52 UNIT(S): 100 INJECTION, SOLUTION SUBCUTANEOUS at 22:51

## 2020-03-30 RX ADMIN — CEFTRIAXONE 100 MILLIGRAM(S): 500 INJECTION, POWDER, FOR SOLUTION INTRAMUSCULAR; INTRAVENOUS at 18:43

## 2020-03-30 RX ADMIN — AMLODIPINE BESYLATE 10 MILLIGRAM(S): 2.5 TABLET ORAL at 04:50

## 2020-03-30 RX ADMIN — Medication 400 MILLIGRAM(S): at 12:07

## 2020-03-30 RX ADMIN — Medication 6: at 18:37

## 2020-03-30 RX ADMIN — Medication 7: at 12:43

## 2020-03-30 RX ADMIN — ALBUTEROL 2 PUFF(S): 90 AEROSOL, METERED ORAL at 18:38

## 2020-03-30 RX ADMIN — Medication 20 MILLIGRAM(S): at 04:50

## 2020-03-30 RX ADMIN — Medication 3: at 22:50

## 2020-03-30 RX ADMIN — ATORVASTATIN CALCIUM 40 MILLIGRAM(S): 80 TABLET, FILM COATED ORAL at 22:51

## 2020-03-30 RX ADMIN — ENOXAPARIN SODIUM 40 MILLIGRAM(S): 100 INJECTION SUBCUTANEOUS at 12:07

## 2020-03-30 NOTE — PHARMACOTHERAPY INTERVENTION NOTE - COMMENTS
ONEAL SZYMANSKI, 54y Female with COVID-19 infection, increasing O2 req, febrile, left basilar opacity on CXR.     Recommendation(s):  1) Suggest starting hydroxychloroquine 400 mg PO q12h x 2 doses, then 200 mg PO q12h x 8 more doses (total of 5 days).   2) Would consider D/C zpak (poor evidence in support of combo, can prolong QTc with plaquenil)  3) Check EKG (make sure less than 500 ms)    With kind regards,  Santino Bhakta, SherieD  PGY-2 Infectious Diseases Pharmacy Resident  Spectra [id number]  .

## 2020-03-30 NOTE — CHART NOTE - NSCHARTNOTEFT_GEN_A_CORE
Pt with poorly controlled type 2 DM, Hg A1c 12.4 %. Pt's home basal insulin was 55units, started at 40 Units on admission, serum glucose this  mg/dl, Lantus increased to 54 Units, SS increased to moderate, will continue to monitor     Susanne Blankenship [facility] [id number]

## 2020-03-30 NOTE — PROGRESS NOTE ADULT - SUBJECTIVE AND OBJECTIVE BOX
SUBJECTIVE / OVERNIGHT EVENTS:  Overnight, patient was noted to have hyperglycemia. Lantus dosing was increased in AM. Patient reports ongoing SOB.   MEDICATIONS  (STANDING):  amLODIPine   Tablet 10 milliGRAM(s) Oral daily  ascorbic acid 100 milliGRAM(s) Oral daily  cefTRIAXone   IVPB 1000 milliGRAM(s) IV Intermittent every 24 hours  dextrose 5%. 1000 milliLiter(s) (50 mL/Hr) IV Continuous <Continuous>  dextrose 50% Injectable 12.5 Gram(s) IV Push once  dextrose 50% Injectable 25 Gram(s) IV Push once  dextrose 50% Injectable 25 Gram(s) IV Push once  enoxaparin Injectable 40 milliGRAM(s) SubCutaneous daily  hydroxychloroquine   Oral   hydroxychloroquine 400 milliGRAM(s) Oral every 12 hours  influenza   Vaccine 0.5 milliLiter(s) IntraMuscular once  insulin glargine Injectable (LANTUS) 52 Unit(s) SubCutaneous at bedtime  insulin lispro (HumaLOG) corrective regimen sliding scale   SubCutaneous at bedtime  insulin lispro (HumaLOG) corrective regimen sliding scale   SubCutaneous three times a day before meals  thiamine 200 milliGRAM(s) Oral daily    MEDICATIONS  (PRN):  ALBUTerol    90 MICROgram(s) HFA Inhaler 2 Puff(s) Inhalation every 6 hours PRN Shortness of Breath  aluminum hydroxide/magnesium hydroxide/simethicone Suspension 30 milliLiter(s) Oral every 8 hours PRN Dyspepsia  dextrose 40% Gel 15 Gram(s) Oral once PRN Blood Glucose LESS THAN 70 milliGRAM(s)/deciliter  glucagon  Injectable 1 milliGRAM(s) IntraMuscular once PRN Glucose LESS THAN 70 milligrams/deciliter    CAPILLARY BLOOD GLUCOSE  POCT Blood Glucose.: 421 mg/dL (30 Mar 2020 12:05)  POCT Blood Glucose.: 367 mg/dL (30 Mar 2020 12:03)  POCT Blood Glucose.: 447 mg/dL (29 Mar 2020 23:01)  POCT Blood Glucose.: 435 mg/dL (29 Mar 2020 22:59)  POCT Blood Glucose.: 295 mg/dL (29 Mar 2020 18:20)    I&O's Summary    T(C): 37.1 (03-30-20 @ 04:49), Max: 39.9 (03-29-20 @ 17:47)  HR: 87 (03-30-20 @ 04:49) (87 - 104)  BP: 156/87 (03-30-20 @ 04:49) (106/55 - 156/87)  RR: 20 (03-30-20 @ 04:49) (18 - 22)  SpO2: 95% (03-30-20 @ 04:49) (95% - 100%)    PHYSICAL EXAM:  GENERAL: NAD, well-developed  HEAD:  Atraumatic, Normocephalic  EYES: EOMI, PERRLA, conjunctiva and sclera clear  ENT: Supplemental O2 via nasal cannula  NECK: Supple, No JVD  CHEST/LUNG:   HEART:   ABDOMEN:  EXTREMITIES: Full range of motion in all extremities.   PSYCH: AAOx3  NEUROLOGY: non-focal  SKIN: No rashes or lesions    LABS:                        12.8   4.57  )-----------( 223      ( 29 Mar 2020 03:31 )             40.1     03-30    134<L>  |  92<L>  |  17  ----------------------------<  441<H>  4.3   |  25  |  0.71    Ca    9.0      30 Mar 2020 07:30    TPro  7.9  /  Alb  4.0  /  TBili  0.3  /  DBili  x   /  AST  34<H>  /  ALT  27  /  AlkPhos  69  03-29    PT/INR - ( 30 Mar 2020 07:30 )   PT: 11.3 SEC;   INR: 0.98        PTT - ( 30 Mar 2020 07:30 )  PTT:30.8 SEC  CARDIAC MARKERS ( 30 Mar 2020 07:30 )  x     / x     / 192 u/L / x     / x        RADIOLOGY & ADDITIONAL TESTS:  < from: Xray Chest 1 View- PORTABLE-Urgent (03.29.20 @ 05:11) >  IMPRESSION:    Leftbasilar opacity worrisome for pneumonia. No pneumothorax. The cardiomediastinal silhouette is poorly evaluated on this projection. Degenerative changes of the spine.

## 2020-03-30 NOTE — CHART NOTE - NSCHARTNOTEFT_GEN_A_CORE
ELECTROPHYSIOLOGY      Patient being treated for COVIDS-19 with hydroxychloroquine. Patient not on telemetry.  Baseline EKG: pending  Biotel patch placed on left chest for remote monitoring of QTc as well as ventricular arrhythmias.   EP team to follow..

## 2020-03-30 NOTE — PROGRESS NOTE ADULT - PROBLEM SELECTOR PLAN 5
- Carbohydrate restricted diet  - Lantus dosing was adjusted this morning  - Will titrate further pending FSBS values over the next 24 hrs.

## 2020-03-30 NOTE — PROGRESS NOTE ADULT - ASSESSMENT
54 y.o. woman with HTN, DM, asthma, and HLD now with acute respiratory failure secondary to COVID-19 infection.

## 2020-03-30 NOTE — PROGRESS NOTE ADULT - PROBLEM SELECTOR PLAN 1
- Continue with supplemental O2 via nasal cannula and uptitrate to keep O2 saturation >92%  - Continue with IV abx and hydroxychloroquine  - ID and pulmonary consult if symptoms worsen  - Will obtain Procalcitonin, CRP, ESR, and ferritin in AM

## 2020-03-31 ENCOUNTER — TRANSCRIPTION ENCOUNTER (OUTPATIENT)
Age: 55
End: 2020-03-31

## 2020-03-31 DIAGNOSIS — J96.01 ACUTE RESPIRATORY FAILURE WITH HYPOXIA: ICD-10-CM

## 2020-03-31 DIAGNOSIS — R11.0 NAUSEA: ICD-10-CM

## 2020-03-31 LAB
ALBUMIN SERPL ELPH-MCNC: 4.3 G/DL — SIGNIFICANT CHANGE UP (ref 3.3–5)
ALP SERPL-CCNC: 80 U/L — SIGNIFICANT CHANGE UP (ref 40–120)
ALT FLD-CCNC: 42 U/L — HIGH (ref 4–33)
ANION GAP SERPL CALC-SCNC: 15 MMO/L — HIGH (ref 7–14)
AST SERPL-CCNC: 64 U/L — HIGH (ref 4–32)
BASOPHILS # BLD AUTO: 0.01 K/UL — SIGNIFICANT CHANGE UP (ref 0–0.2)
BASOPHILS NFR BLD AUTO: 0.1 % — SIGNIFICANT CHANGE UP (ref 0–2)
BILIRUB SERPL-MCNC: 0.3 MG/DL — SIGNIFICANT CHANGE UP (ref 0.2–1.2)
BUN SERPL-MCNC: 22 MG/DL — SIGNIFICANT CHANGE UP (ref 7–23)
CALCIUM SERPL-MCNC: 9.6 MG/DL — SIGNIFICANT CHANGE UP (ref 8.4–10.5)
CHLORIDE SERPL-SCNC: 95 MMOL/L — LOW (ref 98–107)
CO2 SERPL-SCNC: 30 MMOL/L — SIGNIFICANT CHANGE UP (ref 22–31)
CREAT SERPL-MCNC: 0.74 MG/DL — SIGNIFICANT CHANGE UP (ref 0.5–1.3)
CRP SERPL-MCNC: 125.5 MG/L — HIGH
EOSINOPHIL # BLD AUTO: 0 K/UL — SIGNIFICANT CHANGE UP (ref 0–0.5)
EOSINOPHIL NFR BLD AUTO: 0 % — SIGNIFICANT CHANGE UP (ref 0–6)
ERYTHROCYTE [SEDIMENTATION RATE] IN BLOOD: 83 MM/HR — HIGH (ref 4–25)
FERRITIN SERPL-MCNC: 1154 NG/ML — HIGH (ref 15–150)
GLUCOSE SERPL-MCNC: 399 MG/DL — HIGH (ref 70–99)
HBA1C BLD-MCNC: 12.2 % — HIGH (ref 4–5.6)
HCT VFR BLD CALC: 43 % — SIGNIFICANT CHANGE UP (ref 34.5–45)
HGB BLD-MCNC: 13.5 G/DL — SIGNIFICANT CHANGE UP (ref 11.5–15.5)
IMM GRANULOCYTES NFR BLD AUTO: 0.6 % — SIGNIFICANT CHANGE UP (ref 0–1.5)
LDH SERPL L TO P-CCNC: 505 U/L — HIGH (ref 135–225)
LYMPHOCYTES # BLD AUTO: 1.03 K/UL — SIGNIFICANT CHANGE UP (ref 1–3.3)
LYMPHOCYTES # BLD AUTO: 9.6 % — LOW (ref 13–44)
MAGNESIUM SERPL-MCNC: 2.6 MG/DL — SIGNIFICANT CHANGE UP (ref 1.6–2.6)
MCHC RBC-ENTMCNC: 26.7 PG — LOW (ref 27–34)
MCHC RBC-ENTMCNC: 31.4 % — LOW (ref 32–36)
MCV RBC AUTO: 85.1 FL — SIGNIFICANT CHANGE UP (ref 80–100)
MONOCYTES # BLD AUTO: 0.36 K/UL — SIGNIFICANT CHANGE UP (ref 0–0.9)
MONOCYTES NFR BLD AUTO: 3.3 % — SIGNIFICANT CHANGE UP (ref 2–14)
NEUTROPHILS # BLD AUTO: 9.32 K/UL — HIGH (ref 1.8–7.4)
NEUTROPHILS NFR BLD AUTO: 86.4 % — HIGH (ref 43–77)
NRBC # FLD: 0 K/UL — SIGNIFICANT CHANGE UP (ref 0–0)
PHOSPHATE SERPL-MCNC: 3.2 MG/DL — SIGNIFICANT CHANGE UP (ref 2.5–4.5)
PLATELET # BLD AUTO: 372 K/UL — SIGNIFICANT CHANGE UP (ref 150–400)
PMV BLD: 9.7 FL — SIGNIFICANT CHANGE UP (ref 7–13)
POTASSIUM SERPL-MCNC: 4.9 MMOL/L — SIGNIFICANT CHANGE UP (ref 3.5–5.3)
POTASSIUM SERPL-SCNC: 4.9 MMOL/L — SIGNIFICANT CHANGE UP (ref 3.5–5.3)
PROT SERPL-MCNC: 7.9 G/DL — SIGNIFICANT CHANGE UP (ref 6–8.3)
RBC # BLD: 5.05 M/UL — SIGNIFICANT CHANGE UP (ref 3.8–5.2)
RBC # FLD: 13.4 % — SIGNIFICANT CHANGE UP (ref 10.3–14.5)
SODIUM SERPL-SCNC: 140 MMOL/L — SIGNIFICANT CHANGE UP (ref 135–145)
WBC # BLD: 10.44 K/UL — SIGNIFICANT CHANGE UP (ref 3.8–10.5)
WBC # FLD AUTO: 10.44 K/UL — SIGNIFICANT CHANGE UP (ref 3.8–10.5)

## 2020-03-31 PROCEDURE — 99233 SBSQ HOSP IP/OBS HIGH 50: CPT

## 2020-03-31 RX ORDER — ASCORBIC ACID 60 MG
500 TABLET,CHEWABLE ORAL
Refills: 0 | Status: DISCONTINUED | OUTPATIENT
Start: 2020-03-31 | End: 2020-04-07

## 2020-03-31 RX ORDER — INSULIN GLARGINE 100 [IU]/ML
60 INJECTION, SOLUTION SUBCUTANEOUS AT BEDTIME
Refills: 0 | Status: DISCONTINUED | OUTPATIENT
Start: 2020-03-31 | End: 2020-04-07

## 2020-03-31 RX ORDER — INSULIN LISPRO 100/ML
8 VIAL (ML) SUBCUTANEOUS
Refills: 0 | Status: DISCONTINUED | OUTPATIENT
Start: 2020-03-31 | End: 2020-03-31

## 2020-03-31 RX ORDER — ONDANSETRON 8 MG/1
4 TABLET, FILM COATED ORAL EVERY 6 HOURS
Refills: 0 | Status: DISCONTINUED | OUTPATIENT
Start: 2020-03-31 | End: 2020-04-07

## 2020-03-31 RX ORDER — INSULIN LISPRO 100/ML
3 VIAL (ML) SUBCUTANEOUS
Refills: 0 | Status: DISCONTINUED | OUTPATIENT
Start: 2020-03-31 | End: 2020-04-07

## 2020-03-31 RX ORDER — INSULIN LISPRO 100/ML
3 VIAL (ML) SUBCUTANEOUS
Refills: 0 | Status: DISCONTINUED | OUTPATIENT
Start: 2020-03-31 | End: 2020-03-31

## 2020-03-31 RX ORDER — ACETAMINOPHEN 500 MG
650 TABLET ORAL EVERY 6 HOURS
Refills: 0 | Status: DISCONTINUED | OUTPATIENT
Start: 2020-03-31 | End: 2020-04-07

## 2020-03-31 RX ADMIN — AMLODIPINE BESYLATE 10 MILLIGRAM(S): 2.5 TABLET ORAL at 05:07

## 2020-03-31 RX ADMIN — Medication 4: at 12:53

## 2020-03-31 RX ADMIN — Medication 650 MILLIGRAM(S): at 09:46

## 2020-03-31 RX ADMIN — Medication 3 UNIT(S): at 17:30

## 2020-03-31 RX ADMIN — Medication 650 MILLIGRAM(S): at 16:51

## 2020-03-31 RX ADMIN — Medication 200 MILLIGRAM(S): at 12:54

## 2020-03-31 RX ADMIN — Medication 200 MILLIGRAM(S): at 21:59

## 2020-03-31 RX ADMIN — CEFTRIAXONE 100 MILLIGRAM(S): 500 INJECTION, POWDER, FOR SOLUTION INTRAMUSCULAR; INTRAVENOUS at 16:50

## 2020-03-31 RX ADMIN — Medication 500 MILLIGRAM(S): at 16:57

## 2020-03-31 RX ADMIN — ENOXAPARIN SODIUM 40 MILLIGRAM(S): 100 INJECTION SUBCUTANEOUS at 12:53

## 2020-03-31 RX ADMIN — ATORVASTATIN CALCIUM 40 MILLIGRAM(S): 80 TABLET, FILM COATED ORAL at 21:59

## 2020-03-31 RX ADMIN — Medication 200 MILLIGRAM(S): at 09:20

## 2020-03-31 RX ADMIN — Medication 3 UNIT(S): at 12:53

## 2020-03-31 RX ADMIN — Medication 6: at 09:17

## 2020-03-31 RX ADMIN — ALBUTEROL 2 PUFF(S): 90 AEROSOL, METERED ORAL at 05:05

## 2020-03-31 RX ADMIN — Medication 2: at 17:31

## 2020-03-31 NOTE — CHART NOTE - NSCHARTNOTEFT_GEN_A_CORE
ELECTROPHYSIOLOGY    Patient on Biotel monitor.  Report reviewed. Sinus rhythm with QTc 428ms.  Electrolytes stable.   Continue monitoring.

## 2020-03-31 NOTE — DISCHARGE NOTE PROVIDER - NSDCMRMEDTOKEN_GEN_ALL_CORE_FT
albuterol 90 mcg/inh inhalation aerosol: 2 puff(s) inhaled every 6 hours, As Needed  amLODIPine 10 mg oral tablet: 1 tab(s) orally once a day  Crestor 10 mg oral tablet: 1 tab(s) orally once a day  Lantus 100 units/mL subcutaneous solution: 55 unit(s) subcutaneous once a day (at bedtime)  metFORMIN 500 mg oral tablet, extended release: 1 tab(s) orally once a day  Victoza 18 mg/3 mL subcutaneous solution: 1 microgram(s) subcutaneous once a day  Zithromax Z-Hamilton 250 mg oral tablet: Take 500 mg (2 tabs) on day 1, then 250 mg once a day. Take as directed. acetaminophen 325 mg oral tablet: 2 tab(s) orally every 6 hours, As needed, Temp greater or equal to 38C (100.4F), Mild Pain (1 - 3), Moderate Pain (4 - 6)  albuterol 90 mcg/inh inhalation aerosol: 2 puff(s) inhaled every 6 hours, As Needed  amLODIPine 10 mg oral tablet: 1 tab(s) orally once a day  atorvastatin 40 mg oral tablet: 1 tab(s) orally once a day (at bedtime)  Crestor 10 mg oral tablet: 1 tab(s) orally once a day  guaiFENesin 100 mg/5 mL oral liquid: 5 milliliter(s) orally every 6 hours, As needed, Cough  Lantus 100 units/mL subcutaneous solution: 55 unit(s) subcutaneous once a day (at bedtime)  metFORMIN 500 mg oral tablet, extended release: 1 tab(s) orally once a day  Victoza 18 mg/3 mL subcutaneous solution: 1 microgram(s) subcutaneous once a day acetaminophen 325 mg oral tablet: 2 tab(s) orally every 6 hours, As needed, Temp greater or equal to 38C (100.4F), Mild Pain (1 - 3), Moderate Pain (4 - 6)  albuterol 90 mcg/inh inhalation aerosol: 2 puff(s) inhaled every 6 hours, As Needed  amLODIPine 10 mg oral tablet: 1 tab(s) orally once a day  Crestor 10 mg oral tablet: 1 tab(s) orally once a day  guaiFENesin 100 mg/5 mL oral liquid: 5 milliliter(s) orally every 6 hours, As needed, Cough  Lantus 100 units/mL subcutaneous solution: 55 unit(s) subcutaneous once a day (at bedtime)  metFORMIN 500 mg oral tablet, extended release: 1 tab(s) orally once a day  Victoza 18 mg/3 mL subcutaneous solution: 1 microgram(s) subcutaneous once a day acetaminophen 325 mg oral tablet: 2 tab(s) orally every 6 hours, As needed, Temp greater or equal to 38C (100.4F), Mild Pain (1 - 3), Moderate Pain (4 - 6)  albuterol 90 mcg/inh inhalation aerosol: 2 puff(s) inhaled every 6 hours, As Needed  amLODIPine 10 mg oral tablet: 1 tab(s) orally once a day  Crestor 10 mg oral tablet: 1 tab(s) orally once a day  guaiFENesin 100 mg/5 mL oral liquid: 5 milliliter(s) orally every 6 hours, As needed, Cough  Lantus 100 units/mL subcutaneous solution: 55 unit(s) subcutaneous once a day (at bedtime)  metFORMIN 500 mg oral tablet, extended release: 1 tab(s) orally once a day  nystatin 100,000 units/mL oral suspension: 5 milliliter(s) orally every 8 hours SWISH and SPIT  Victoza 18 mg/3 mL subcutaneous solution: 1 microgram(s) subcutaneous once a day

## 2020-03-31 NOTE — PROGRESS NOTE ADULT - SUBJECTIVE AND OBJECTIVE BOX
SUBJECTIVE / OVERNIGHT EVENTS:  No overnight events. Patient reports poor appetite and mild epigastric discomfort and nausea. No reports of emesis.     MEDICATIONS  (STANDING):  amLODIPine   Tablet 10 milliGRAM(s) Oral daily  ascorbic acid 500 milliGRAM(s) Oral two times a day  atorvastatin 40 milliGRAM(s) Oral at bedtime  cefTRIAXone   IVPB 1000 milliGRAM(s) IV Intermittent every 24 hours  dextrose 5%. 1000 milliLiter(s) (50 mL/Hr) IV Continuous <Continuous>  dextrose 50% Injectable 12.5 Gram(s) IV Push once  dextrose 50% Injectable 25 Gram(s) IV Push once  dextrose 50% Injectable 25 Gram(s) IV Push once  enoxaparin Injectable 40 milliGRAM(s) SubCutaneous daily  hydroxychloroquine   Oral   hydroxychloroquine 200 milliGRAM(s) Oral every 12 hours  influenza   Vaccine 0.5 milliLiter(s) IntraMuscular once  insulin glargine Injectable (LANTUS) 60 Unit(s) SubCutaneous at bedtime  insulin lispro (HumaLOG) corrective regimen sliding scale   SubCutaneous at bedtime  insulin lispro (HumaLOG) corrective regimen sliding scale   SubCutaneous three times a day before meals  insulin lispro Injectable (HumaLOG) 3 Unit(s) SubCutaneous three times a day before meals  thiamine 200 milliGRAM(s) Oral daily    MEDICATIONS  (PRN):  acetaminophen   Tablet .. 650 milliGRAM(s) Oral every 6 hours PRN Temp greater or equal to 38C (100.4F), Mild Pain (1 - 3), Moderate Pain (4 - 6)  ALBUTerol    90 MICROgram(s) HFA Inhaler 2 Puff(s) Inhalation every 6 hours PRN Shortness of Breath  aluminum hydroxide/magnesium hydroxide/simethicone Suspension 30 milliLiter(s) Oral every 8 hours PRN Dyspepsia  dextrose 40% Gel 15 Gram(s) Oral once PRN Blood Glucose LESS THAN 70 milliGRAM(s)/deciliter  glucagon  Injectable 1 milliGRAM(s) IntraMuscular once PRN Glucose LESS THAN 70 milligrams/deciliter    CAPILLARY BLOOD GLUCOSE  POCT Blood Glucose.: 291 mg/dL (31 Mar 2020 12:18)  POCT Blood Glucose.: 364 mg/dL (31 Mar 2020 08:49)  POCT Blood Glucose.: 353 mg/dL (30 Mar 2020 22:34)  POCT Blood Glucose.: 357 mg/dL (30 Mar 2020 18:27)  POCT Blood Glucose.: 360 mg/dL (30 Mar 2020 17:20)    I&O's Summary    T(C): 37.1 (03-31-20 @ 12:54), Max: 38.2 (03-31-20 @ 09:29)  HR: 93 (03-31-20 @ 12:54) (88 - 97)  BP: 125/70 (03-31-20 @ 12:54) (120/69 - 138/75)  RR: 19 (03-31-20 @ 12:54) (18 - 22)  SpO2: 93% (03-31-20 @ 12:54) (93% - 96%)  On 4L/min nasal cannula    PHYSICAL EXAM:  GENERAL: NAD, well-developed  HEAD:  Atraumatic, Normocephalic  EYES: EOMI, PERRLA, conjunctiva and sclera clear  ENT Supplemental O2 in place via nasal cannula  NECK: Supple, No JVD  CHEST/LUNG:  HEART:   ABDOMEN: Soft, Mild tenderness to palpation, Nondistended  EXTREMITIES: Non-edematous b/l LEs.   PSYCH: AAOx3  NEUROLOGY: non-focal  SKIN: No rashes or lesions    LABS:                        13.5   10.44 )-----------( 372      ( 31 Mar 2020 06:40 )             43.0     03-31    140  |  95<L>  |  22  ----------------------------<  399<H>  4.9   |  30  |  0.74    Ca    9.6      31 Mar 2020 06:40  Phos  3.2     03-31  Mg     2.6     03-31    TPro  7.9  /  Alb  4.3  /  TBili  0.3  /  DBili  x   /  AST  64<H>  /  ALT  42<H>  /  AlkPhos  80  03-31    PT/INR - ( 30 Mar 2020 07:30 )   PT: 11.3 SEC;   INR: 0.98          PTT - ( 30 Mar 2020 07:30 )  PTT:30.8 SEC  CARDIAC MARKERS ( 30 Mar 2020 07:30 )  x     / x     / 192 u/L / x     / x

## 2020-03-31 NOTE — DISCHARGE NOTE PROVIDER - PROVIDER TOKENS
FREE:[LAST:[PCP],PHONE:[(   )    -],FAX:[(   )    -],ADDRESS:[please follow up with your PCP 1-2 weeks]] FREE:[LAST:[Primary care provider],PHONE:[(   )    -],FAX:[(   )    -]],FREE:[LAST:[Endocrinologist],PHONE:[(   )    -],FAX:[(   )    -]]

## 2020-03-31 NOTE — DISCHARGE NOTE PROVIDER - CARE PROVIDER_API CALL
PCP,   please follow up with your PCP 1-2 weeks  Phone: (   )    -  Fax: (   )    -  Follow Up Time: Primary care provider,   Phone: (   )    -  Fax: (   )    -  Follow Up Time:     Endocrinologist,   Phone: (   )    -  Fax: (   )    -  Follow Up Time:

## 2020-03-31 NOTE — DISCHARGE NOTE PROVIDER - NSDCFUADDINST_GEN_ALL_CORE_FT
You have been diagnosed with the COVID-19 virus during your hospital stay. You must self quarantine to complete a 14 day time period.  Monitor for fevers, shortness of breath and cough primarily.  Monitor your temperature daily to not any changes and increases.      It has been determined that you no longer need hospitalization and can recover while remaining in self-quarantine at home. You should follow the prevention steps below until a healthcare provider or local or state health department says you can return to your normal activities.    1. You should restrict activities outside your home, except for getting medical care.  2. Do not go to work, school, or public areas.  3. Avoid using public transportation, ride-sharing, or taxis.  4. Separate yourself from other people and animals in your home.  5. Call ahead before visiting your doctor.  6. Wear a facemask.  7. Cover your coughs and sneezes.  8. Clean your hands often.  9. Avoid sharing personal household items.  10. Clean all “high-touch” surfaces everyday.  11. Monitor your symptoms.  If you have a medical emergency and need to call 911, notify the dispatch personnel that you have COVID-19 If possible, put on a facemask before emergency medical services arrive.  12. Stopping home isolation.  Patients with confirmed COVID-19 should remain under home isolation precautions for 14 days since the positive COVID-19 test and until the risk of secondary transmission to others is thought to be low. The decision to discontinue home isolation precautions should be made on a case-by-case basis, in consultation with healthcare providers and state and local health departments. [facility] Department of Health can be reached at [phone] for further information about COVID-19.

## 2020-03-31 NOTE — DISCHARGE NOTE PROVIDER - NSDCCPCAREPLAN_GEN_ALL_CORE_FT
PRINCIPAL DISCHARGE DIAGNOSIS  Diagnosis: Hypoxia  Assessment and Plan of Treatment:       SECONDARY DISCHARGE DIAGNOSES  Diagnosis: HTN (hypertension), benign  Assessment and Plan of Treatment: Follopw low Sodium, low cholesterol diet. Continue present medications.    Diagnosis: Type II diabetes mellitus  Assessment and Plan of Treatment: your HgA1c is 12.2, the goal is to keep HgbA1c below 7.0. Follow low carbohydrate diet. Continue Lantus. Please follow up with your endocrinologist routinely    Diagnosis: Leg pain  Assessment and Plan of Treatment:     Diagnosis: Epigastric pain  Assessment and Plan of Treatment: PRINCIPAL DISCHARGE DIAGNOSIS  Diagnosis: Hypoxia  Assessment and Plan of Treatment: You have been diagnosed with the COVID-19 virus during your hospital stay. You must self quarantine to complete a 14 day time period.  Monitor for fevers, shortness of breath and cough primarily.  Monitor your temperature daily to not any changes and increases.    It has been determined that you no longer need hospitalization and can recover while remaining in self-quarantine at home. You should follow the prevention steps below until a healthcare provider or local or state health department says you can return to your normal activities.  1. You should restrict activities outside your home, except for getting medical care.  2. Do not go to work, school, or public areas.  3. Avoid using public transportation, ride-sharing, or taxis.  4. Separate yourself from other people and animals in your home.  5. Call ahead before visiting your doctor.  6. Wear a facemask.  7. Cover your coughs and sneezes.  8. Clean your hands often.  9. Avoid sharing personal household items.  10. Clean all “high-touch” surfaces everyday.  11. Monitor your symptoms.  If you have a medical emergency and need to call 911, notify the dispatch personnel that you have COVID-19 If possible, put on a facemask before emergency medical services arrive.  12. Stopping home isolation.  Patients with confirmed COVID-19 should remain under home isolation precautions for 14 days since the positive COVID-19 test and until the risk of secondary transmission to others is thought to be low. The decision to discontinue home isolation precautions should be made on a case-by-case basis, in consultation with healthcare providers and state and local health departments. Your [facility] Department of Health can be reached at [phone] for further information about COVID-19.        SECONDARY DISCHARGE DIAGNOSES  Diagnosis: HTN (hypertension), benign  Assessment and Plan of Treatment: Follopw low Sodium, low cholesterol diet. Continue present medications.    Diagnosis: Type II diabetes mellitus  Assessment and Plan of Treatment: your HgA1c is 12.2, the goal is to keep HgbA1c below 7.0. Follow low carbohydrate diet. Continue Lantus. Please follow up with your endocrinologist routinely    Diagnosis: Leg pain  Assessment and Plan of Treatment:     Diagnosis: Epigastric pain  Assessment and Plan of Treatment: PRINCIPAL DISCHARGE DIAGNOSIS  Diagnosis: Hypoxia  Assessment and Plan of Treatment: You have been diagnosed with the COVID-19 virus during your hospital stay. You must self quarantine to complete a 14 day time period.  Monitor for fevers, shortness of breath and cough primarily.  Monitor your temperature daily to not any changes and increases.    It has been determined that you no longer need hospitalization and can recover while remaining in self-quarantine at home. You should follow the prevention steps below until a healthcare provider or local or state health department says you can return to your normal activities.  1. You should restrict activities outside your home, except for getting medical care.  2. Do not go to work, school, or public areas.  3. Avoid using public transportation, ride-sharing, or taxis.  4. Separate yourself from other people and animals in your home.  5. Call ahead before visiting your doctor.  6. Wear a facemask.  7. Cover your coughs and sneezes.  8. Clean your hands often.  9. Avoid sharing personal household items.  10. Clean all “high-touch” surfaces everyday.  11. Monitor your symptoms.  If you have a medical emergency and need to call 911, notify the dispatch personnel that you have COVID-19 If possible, put on a facemask before emergency medical services arrive.  12. Stopping home isolation.  Patients with confirmed COVID-19 should remain under home isolation precautions for 14 days since the positive COVID-19 test and until the risk of secondary transmission to others is thought to be low. The decision to discontinue home isolation precautions should be made on a case-by-case basis, in consultation with healthcare providers and state and local health departments. Your [facility] Department of Health can be reached at [phone] for further information about COVID-19.        SECONDARY DISCHARGE DIAGNOSES  Diagnosis: HTN (hypertension), benign  Assessment and Plan of Treatment: Continue blood pressure medication regimen as directed. Monitor for any visual changes, headaches or dizziness.  Monitor blood pressure regularly.  Follow up with your PCP for further management for high blood pressure.    Diagnosis: Type II diabetes mellitus  Assessment and Plan of Treatment: Your HgA1c is 12.2, the goal is to keep HgbA1c below 7.0. Follow low carbohydrate diet. Continue Lantus. Please follow up with your endocrinologist routinely.  Continue your medication regimen and a consistent carbohydrate diet (Meaning eating the same amount of carbohydrates at the same time each day). Monitor blood glucose levels throughout the day before meals and at bedtime. Record blood sugars and bring to outpatient providers appointment in order to be reviewed by your doctor for management modifications. Monitor for signs/symptoms of low blood glucose, such as, dizziness, altered mental status, or cool/clammy skin. In addition, monitor for signs/symptoms of high blood glucose, such as, feeling hot, dry, fatigued, or with increased thirst/urination. Make regular podiatry appointments in order to have feet checked for wounds and uncontrolled toe nail growth to prevent infections, as well as, appointments with an ophthalmologist to monitor your vision.    Diagnosis: Hyperlipidemia, unspecified hyperlipidemia type  Assessment and Plan of Treatment: Continue cholesterol control medications. Continue DASH diet. Follow up with your PCP within 1 week of discharge for further management and monitoring of lipid and cholesterol panels.    Diagnosis: Oral thrush  Assessment and Plan of Treatment: Cotninue with Nystatin swisha dn spit for 4 more additional days. Please follow up with your primary care provider within 1 week of discharge from the hospital. If you do not have a primary care provider please follow up with the [facility], call [phone]    Diagnosis: Asthma, unspecified asthma severity, unspecified whether complicated, unspecified whether persistent  Assessment and Plan of Treatment: Continue your inhalers and annual pulmonary function tests with your outpatient provider. Monitor for asthma exacerbation, such as, shortness of breath, hyperventilation, or any other difficulties breathing and report to the emergency room in the event that your rescue inhaler has not resolved your symptoms.

## 2020-03-31 NOTE — PROGRESS NOTE ADULT - PROBLEM SELECTOR PLAN 6
- Carbohydrate restricted diet  - Increase premeal insulin for improved glycemic control   - Continue with current lantus dose  - Will titrate further pending FSBS values over the next 24 hrs. - Carbohydrate restricted diet  - Increase Lantus dosing to 60 units for improved glycemic control   - Continue with current lantus dose  - Will titrate further pending FSBS values over the next 24 hrs.

## 2020-03-31 NOTE — DISCHARGE NOTE PROVIDER - NSDCFUADDAPPT_GEN_ALL_CORE_FT
Follow up with your primary care physician for further monitoring in 1-2 weeks. Please call to arrange appointment. Please follow up with your primary care provider within 1 week of discharge from the hospital. If you do not have a primary care provider please follow up with the [facility], call [phone]

## 2020-03-31 NOTE — DISCHARGE NOTE PROVIDER - HOSPITAL COURSE
53 yo with PMH of HLD, HTN, Asthma was BIBEMS on 3/29/20 c/o SOB, cough,     cp with cough,  nausea, fever.              CXR  3/29/20 - Leftbasilar opacity worrisome for pneumonia. No pneumothorax. The cardiomediastinal silhouette is poorly evaluated on this projection. Degenerative changes of the spine.    Abdominal sono 3/29/20 - No cholelithiasis or sonographic evidence of acute cholecystitis.        The patient was diagnosed with:        - SARS-associated coronavirus pneumonia - presently on Hydrochloroquine, Abuterol HFA 2 puffs prn, on O2  via NC,     Vit c, Thiamine. EP consult following QTC protocol.             ·  Hypoxia - presently albuterol HFA 2 puffs prrn, oxygen via NC .              · Moderate asthma with acute exacerbation - albuterol HFA2 puffs prn, on oxygen via NC             ·  Diabetes Mellitus - Insulin adjusted             · Hypertension - Continue presents medications 54 y.o. woman with HTN, DM, asthma, and HLD now with acute respiratory failure secondary to COVID-19 infection.          Acute respiratory failure with hypoxia.       - to COVID-19 infection    - Clinically improving    - Continue with supplemental O2 via nasal cannula    - Prone position    - Incentive spirometry    - Discharge planning to home.          SARS-associated coronavirus infection.      - Continue with droplet and contact precautions    - Continue with plaquenil    - Continue with supportive care.          Essential hypertension.      - Monitor BP closely    - Low sodium diet    - Continue with amlodipine.          Hyperlipidemia, unspecified hyperlipidemia type.      - Low cholesterol diet    - Start on moderate intensity statin therapy.          Asthma, unspecified asthma severity, unspecified whether complicated, unspecified whether persistent.     - Albuterol via inhaler as needed for SOB/wheezing.          Type 2 diabetes mellitus without complication, without long-term current use of insulin.     - Carbohydrate restricted diet    - continue with current Lantus dosing.        Chest pain, atypical.     -Now resolved.         Oral thrush.      - Continue with nystatin swish and spit.             On 4/5/20, case was discussed with , patient is medically cleared and optimized for discharge today. All medications were reviewed with attending, and sent to mutually agreed upon pharmacy 54 y.o. woman with HTN, DM, asthma, and HLD now with acute respiratory failure secondary to COVID-19 infection.          Acute respiratory failure with hypoxia.       - to COVID-19 infection    - status post Plaquenil    - Now on Room air    - Discharge planning to home.          SARS-associated coronavirus infection.      - status post Plaquenil    - Continue with supportive care.          Essential hypertension.      - Monitor BP closely    - Low sodium diet    - Continue with amlodipine.          Hyperlipidemia, unspecified hyperlipidemia type.      - Low cholesterol diet         Asthma, unspecified asthma severity, unspecified whether complicated, unspecified whether persistent.     - Albuterol via inhaler as needed for SOB/wheezing.          Type 2 diabetes mellitus without complication, without long-term current use of insulin.     - Carbohydrate restricted diet        Chest pain, atypical.     -Now resolved.         Oral thrush.      - Continue with nystatin swish and spit.         On 4/7/20, case was discussed with Dr. Pierce, patient is medically cleared and optimized for discharge today. All medications were reviewed with attending, and sent to mutually agreed upon pharmacy 54 y.o. woman with HTN, DM, asthma, and HLD now with acute respiratory failure secondary to COVID-19 infection.          Acute respiratory failure with hypoxia.       - to COVID-19 infection    - status post Plaquenil    - Now on Room air    - Discharge planning to home.          SARS-associated coronavirus infection.      - status post Plaquenil, improved    - Continue with supportive care.          Essential hypertension.      - Monitor BP closely    - Low sodium diet    - Continue with amlodipine.          Hyperlipidemia, unspecified hyperlipidemia type.      - Low cholesterol diet         Asthma, unspecified asthma severity, unspecified whether complicated, unspecified whether persistent.     - Albuterol via inhaler as needed for SOB/wheezing.          Type 2 diabetes mellitus without complication, without long-term current use of insulin.     - Carbohydrate restricted diet        Chest pain, atypical.     -Now resolved.         Oral thrush.      - Continue with nystatin swish and spit.         On 4/7/20, case was discussed with Dr. Pierce, patient is medically cleared and optimized for discharge today. All medications were reviewed with attending, and sent to mutually agreed upon pharmacy

## 2020-03-31 NOTE — PROGRESS NOTE ADULT - PROBLEM SELECTOR PLAN 2
- Continue with droplet and contact precautions  - Continue with plaquenil  - Continue with supportive care

## 2020-04-01 DIAGNOSIS — R07.89 OTHER CHEST PAIN: ICD-10-CM

## 2020-04-01 LAB
ALBUMIN SERPL ELPH-MCNC: 3.9 G/DL — SIGNIFICANT CHANGE UP (ref 3.3–5)
ALP SERPL-CCNC: 96 U/L — SIGNIFICANT CHANGE UP (ref 40–120)
ALT FLD-CCNC: 48 U/L — HIGH (ref 4–33)
ANION GAP SERPL CALC-SCNC: 16 MMO/L — HIGH (ref 7–14)
AST SERPL-CCNC: 64 U/L — HIGH (ref 4–32)
BILIRUB SERPL-MCNC: 0.5 MG/DL — SIGNIFICANT CHANGE UP (ref 0.2–1.2)
BUN SERPL-MCNC: 17 MG/DL — SIGNIFICANT CHANGE UP (ref 7–23)
CALCIUM SERPL-MCNC: 10.2 MG/DL — SIGNIFICANT CHANGE UP (ref 8.4–10.5)
CHLORIDE SERPL-SCNC: 95 MMOL/L — LOW (ref 98–107)
CK SERPL-CCNC: 116 U/L — SIGNIFICANT CHANGE UP (ref 25–170)
CO2 SERPL-SCNC: 29 MMOL/L — SIGNIFICANT CHANGE UP (ref 22–31)
CREAT SERPL-MCNC: 0.68 MG/DL — SIGNIFICANT CHANGE UP (ref 0.5–1.3)
GLUCOSE SERPL-MCNC: 195 MG/DL — HIGH (ref 70–99)
MAGNESIUM SERPL-MCNC: 2.6 MG/DL — SIGNIFICANT CHANGE UP (ref 1.6–2.6)
PHOSPHATE SERPL-MCNC: 4.1 MG/DL — SIGNIFICANT CHANGE UP (ref 2.5–4.5)
POTASSIUM SERPL-MCNC: 3.7 MMOL/L — SIGNIFICANT CHANGE UP (ref 3.5–5.3)
POTASSIUM SERPL-SCNC: 3.7 MMOL/L — SIGNIFICANT CHANGE UP (ref 3.5–5.3)
PROT SERPL-MCNC: 8.4 G/DL — HIGH (ref 6–8.3)
SODIUM SERPL-SCNC: 140 MMOL/L — SIGNIFICANT CHANGE UP (ref 135–145)
TROPONIN T, HIGH SENSITIVITY: < 6 NG/L — SIGNIFICANT CHANGE UP (ref ?–14)

## 2020-04-01 PROCEDURE — 93010 ELECTROCARDIOGRAM REPORT: CPT | Mod: 77

## 2020-04-01 PROCEDURE — 93010 ELECTROCARDIOGRAM REPORT: CPT

## 2020-04-01 PROCEDURE — 99233 SBSQ HOSP IP/OBS HIGH 50: CPT

## 2020-04-01 RX ORDER — INSULIN GLARGINE 100 [IU]/ML
15 INJECTION, SOLUTION SUBCUTANEOUS ONCE
Refills: 0 | Status: COMPLETED | OUTPATIENT
Start: 2020-04-01 | End: 2020-04-01

## 2020-04-01 RX ADMIN — ATORVASTATIN CALCIUM 40 MILLIGRAM(S): 80 TABLET, FILM COATED ORAL at 21:16

## 2020-04-01 RX ADMIN — Medication 3 UNIT(S): at 08:53

## 2020-04-01 RX ADMIN — ALBUTEROL 2 PUFF(S): 90 AEROSOL, METERED ORAL at 21:21

## 2020-04-01 RX ADMIN — Medication 200 MILLIGRAM(S): at 21:16

## 2020-04-01 RX ADMIN — INSULIN GLARGINE 15 UNIT(S): 100 INJECTION, SOLUTION SUBCUTANEOUS at 02:01

## 2020-04-01 RX ADMIN — Medication 200 MILLIGRAM(S): at 12:06

## 2020-04-01 RX ADMIN — Medication 3: at 12:58

## 2020-04-01 RX ADMIN — INSULIN GLARGINE 60 UNIT(S): 100 INJECTION, SOLUTION SUBCUTANEOUS at 21:18

## 2020-04-01 RX ADMIN — Medication 500 MILLIGRAM(S): at 17:30

## 2020-04-01 RX ADMIN — CEFTRIAXONE 100 MILLIGRAM(S): 500 INJECTION, POWDER, FOR SOLUTION INTRAMUSCULAR; INTRAVENOUS at 18:13

## 2020-04-01 RX ADMIN — Medication 200 MILLIGRAM(S): at 12:05

## 2020-04-01 RX ADMIN — AMLODIPINE BESYLATE 10 MILLIGRAM(S): 2.5 TABLET ORAL at 04:13

## 2020-04-01 RX ADMIN — Medication 500 MILLIGRAM(S): at 04:13

## 2020-04-01 RX ADMIN — Medication 3 UNIT(S): at 12:58

## 2020-04-01 RX ADMIN — ALBUTEROL 2 PUFF(S): 90 AEROSOL, METERED ORAL at 12:06

## 2020-04-01 RX ADMIN — ENOXAPARIN SODIUM 40 MILLIGRAM(S): 100 INJECTION SUBCUTANEOUS at 12:05

## 2020-04-01 RX ADMIN — Medication 3 UNIT(S): at 17:31

## 2020-04-01 RX ADMIN — Medication 650 MILLIGRAM(S): at 17:30

## 2020-04-01 RX ADMIN — Medication 650 MILLIGRAM(S): at 06:32

## 2020-04-01 RX ADMIN — ALBUTEROL 2 PUFF(S): 90 AEROSOL, METERED ORAL at 04:28

## 2020-04-01 RX ADMIN — Medication 2: at 08:53

## 2020-04-01 NOTE — PROGRESS NOTE ADULT - SUBJECTIVE AND OBJECTIVE BOX
SUBJECTIVE / OVERNIGHT EVENTS:  In AM patient reported chest     MEDICATIONS  (STANDING):  amLODIPine   Tablet 10 milliGRAM(s) Oral daily  ascorbic acid 500 milliGRAM(s) Oral two times a day  atorvastatin 40 milliGRAM(s) Oral at bedtime  cefTRIAXone   IVPB 1000 milliGRAM(s) IV Intermittent every 24 hours  dextrose 5%. 1000 milliLiter(s) (50 mL/Hr) IV Continuous <Continuous>  dextrose 50% Injectable 12.5 Gram(s) IV Push once  dextrose 50% Injectable 25 Gram(s) IV Push once  dextrose 50% Injectable 25 Gram(s) IV Push once  enoxaparin Injectable 40 milliGRAM(s) SubCutaneous daily  hydroxychloroquine   Oral   hydroxychloroquine 200 milliGRAM(s) Oral every 12 hours  influenza   Vaccine 0.5 milliLiter(s) IntraMuscular once  insulin glargine Injectable (LANTUS) 60 Unit(s) SubCutaneous at bedtime  insulin lispro (HumaLOG) corrective regimen sliding scale   SubCutaneous at bedtime  insulin lispro (HumaLOG) corrective regimen sliding scale   SubCutaneous three times a day before meals  insulin lispro Injectable (HumaLOG) 3 Unit(s) SubCutaneous three times a day before meals  thiamine 200 milliGRAM(s) Oral daily    MEDICATIONS  (PRN):  acetaminophen   Tablet .. 650 milliGRAM(s) Oral every 6 hours PRN Temp greater or equal to 38C (100.4F), Mild Pain (1 - 3), Moderate Pain (4 - 6)  ALBUTerol    90 MICROgram(s) HFA Inhaler 2 Puff(s) Inhalation every 6 hours PRN Shortness of Breath  aluminum hydroxide/magnesium hydroxide/simethicone Suspension 30 milliLiter(s) Oral every 8 hours PRN Dyspepsia  dextrose 40% Gel 15 Gram(s) Oral once PRN Blood Glucose LESS THAN 70 milliGRAM(s)/deciliter  glucagon  Injectable 1 milliGRAM(s) IntraMuscular once PRN Glucose LESS THAN 70 milligrams/deciliter  ondansetron Injectable 4 milliGRAM(s) IV Push every 6 hours PRN Nausea and/or Vomiting    CAPILLARY BLOOD GLUCOSE  POCT Blood Glucose.: 213 mg/dL (01 Apr 2020 12:25)  POCT Blood Glucose.: 183 mg/dL (01 Apr 2020 08:34)  POCT Blood Glucose.: 123 mg/dL (01 Apr 2020 02:00)  POCT Blood Glucose.: 140 mg/dL (31 Mar 2020 23:03)  POCT Blood Glucose.: 98 mg/dL (31 Mar 2020 21:55)  POCT Blood Glucose.: 189 mg/dL (31 Mar 2020 17:19)    I&O's Summary    T(C): 36.9 (04-01-20 @ 15:12), Max: 38 (03-31-20 @ 16:51)  HR: 97 (04-01-20 @ 15:12) (92 - 100)  BP: 129/77 (04-01-20 @ 15:12) (125/65 - 156/88)  RR: 22 (04-01-20 @ 15:12) (19 - 22)  SpO2: 92% (04-01-20 @ 15:12) (92% - 94%)  On 4 L/min via nasal cannula    PHYSICAL EXAM:  GENERAL: NAD, well-developed  HEAD:  Atraumatic, Normocephalic  EYES: EOMI, PERRLA, conjunctiva and sclera clear  NECK: Supple, No JVD  CHEST/LUNG:  HEART: Regular rate and rhythm  ABDOMEN: Soft, Nontender, Nondistended  EXTREMITIES:   PSYCH: AAOx3  NEUROLOGY: non-focal  SKIN: No rashes or lesions    LABS:                        13.5   10.44 )-----------( 372      ( 31 Mar 2020 06:40 )             43.0     03-31    140  |  95<L>  |  22  ----------------------------<  399<H>  4.9   |  30  |  0.74    Ca    9.6      31 Mar 2020 06:40  Phos  3.2     03-31  Mg     2.6     03-31    TPro  7.9  /  Alb  4.3  /  TBili  0.3  /  DBili  x   /  AST  64<H>  /  ALT  42<H>  /  AlkPhos  80  03-31 SUBJECTIVE / OVERNIGHT EVENTS:  In AM patient reported chest     MEDICATIONS  (STANDING):  amLODIPine   Tablet 10 milliGRAM(s) Oral daily  ascorbic acid 500 milliGRAM(s) Oral two times a day  atorvastatin 40 milliGRAM(s) Oral at bedtime  cefTRIAXone   IVPB 1000 milliGRAM(s) IV Intermittent every 24 hours  dextrose 5%. 1000 milliLiter(s) (50 mL/Hr) IV Continuous <Continuous>  dextrose 50% Injectable 12.5 Gram(s) IV Push once  dextrose 50% Injectable 25 Gram(s) IV Push once  dextrose 50% Injectable 25 Gram(s) IV Push once  enoxaparin Injectable 40 milliGRAM(s) SubCutaneous daily  hydroxychloroquine   Oral   hydroxychloroquine 200 milliGRAM(s) Oral every 12 hours  influenza   Vaccine 0.5 milliLiter(s) IntraMuscular once  insulin glargine Injectable (LANTUS) 60 Unit(s) SubCutaneous at bedtime  insulin lispro (HumaLOG) corrective regimen sliding scale   SubCutaneous at bedtime  insulin lispro (HumaLOG) corrective regimen sliding scale   SubCutaneous three times a day before meals  insulin lispro Injectable (HumaLOG) 3 Unit(s) SubCutaneous three times a day before meals  thiamine 200 milliGRAM(s) Oral daily    MEDICATIONS  (PRN):  acetaminophen   Tablet .. 650 milliGRAM(s) Oral every 6 hours PRN Temp greater or equal to 38C (100.4F), Mild Pain (1 - 3), Moderate Pain (4 - 6)  ALBUTerol    90 MICROgram(s) HFA Inhaler 2 Puff(s) Inhalation every 6 hours PRN Shortness of Breath  aluminum hydroxide/magnesium hydroxide/simethicone Suspension 30 milliLiter(s) Oral every 8 hours PRN Dyspepsia  dextrose 40% Gel 15 Gram(s) Oral once PRN Blood Glucose LESS THAN 70 milliGRAM(s)/deciliter  glucagon  Injectable 1 milliGRAM(s) IntraMuscular once PRN Glucose LESS THAN 70 milligrams/deciliter  ondansetron Injectable 4 milliGRAM(s) IV Push every 6 hours PRN Nausea and/or Vomiting    CAPILLARY BLOOD GLUCOSE  POCT Blood Glucose.: 213 mg/dL (01 Apr 2020 12:25)  POCT Blood Glucose.: 183 mg/dL (01 Apr 2020 08:34)  POCT Blood Glucose.: 123 mg/dL (01 Apr 2020 02:00)  POCT Blood Glucose.: 140 mg/dL (31 Mar 2020 23:03)  POCT Blood Glucose.: 98 mg/dL (31 Mar 2020 21:55)  POCT Blood Glucose.: 189 mg/dL (31 Mar 2020 17:19)    I&O's Summary    T(C): 36.9 (04-01-20 @ 15:12), Max: 38 (03-31-20 @ 16:51)  HR: 97 (04-01-20 @ 15:12) (92 - 100)  BP: 129/77 (04-01-20 @ 15:12) (125/65 - 156/88)  RR: 22 (04-01-20 @ 15:12) (19 - 22)  SpO2: 92% (04-01-20 @ 15:12) (92% - 94%)  On 4 L/min via nasal cannula    PHYSICAL EXAM:  GENERAL: NAD, well-developed  HEAD:  Atraumatic, Normocephalic  EYES: EOMI, PERRLA, conjunctiva and sclera clear  NECK: Supple, No JVD  CHEST/LUNG:  HEART: Regular rate and rhythm  ABDOMEN: Soft, Nontender, Nondistended  EXTREMITIES:   PSYCH: AAOx3  NEUROLOGY: non-focal  SKIN: No rashes or lesions    LABS:                        13.5   10.44 )-----------( 372      ( 31 Mar 2020 06:40 )             43.0     03-31    140  |  95<L>  |  22  ----------------------------<  399<H>  4.9   |  30  |  0.74    Ca    9.6      31 Mar 2020 06:40  Phos  3.2     03-31  Mg     2.6     03-31    TPro  7.9  /  Alb  4.3  /  TBili  0.3  /  DBili  x   /  AST  64<H>  /  ALT  42<H>  /  AlkPhos  80  03-31    ECG: NSR at 93 BPM, left anterior fascicular block, QTc 447 ms SUBJECTIVE / OVERNIGHT EVENTS:  In AM patient reported chest pain. At present, she reports improvements in her symptoms. Ongoing SOB.     MEDICATIONS  (STANDING):  amLODIPine   Tablet 10 milliGRAM(s) Oral daily  ascorbic acid 500 milliGRAM(s) Oral two times a day  atorvastatin 40 milliGRAM(s) Oral at bedtime  cefTRIAXone   IVPB 1000 milliGRAM(s) IV Intermittent every 24 hours  dextrose 5%. 1000 milliLiter(s) (50 mL/Hr) IV Continuous <Continuous>  dextrose 50% Injectable 12.5 Gram(s) IV Push once  dextrose 50% Injectable 25 Gram(s) IV Push once  dextrose 50% Injectable 25 Gram(s) IV Push once  enoxaparin Injectable 40 milliGRAM(s) SubCutaneous daily  hydroxychloroquine   Oral   hydroxychloroquine 200 milliGRAM(s) Oral every 12 hours  influenza   Vaccine 0.5 milliLiter(s) IntraMuscular once  insulin glargine Injectable (LANTUS) 60 Unit(s) SubCutaneous at bedtime  insulin lispro (HumaLOG) corrective regimen sliding scale   SubCutaneous at bedtime  insulin lispro (HumaLOG) corrective regimen sliding scale   SubCutaneous three times a day before meals  insulin lispro Injectable (HumaLOG) 3 Unit(s) SubCutaneous three times a day before meals  thiamine 200 milliGRAM(s) Oral daily    MEDICATIONS  (PRN):  acetaminophen   Tablet .. 650 milliGRAM(s) Oral every 6 hours PRN Temp greater or equal to 38C (100.4F), Mild Pain (1 - 3), Moderate Pain (4 - 6)  ALBUTerol    90 MICROgram(s) HFA Inhaler 2 Puff(s) Inhalation every 6 hours PRN Shortness of Breath  aluminum hydroxide/magnesium hydroxide/simethicone Suspension 30 milliLiter(s) Oral every 8 hours PRN Dyspepsia  dextrose 40% Gel 15 Gram(s) Oral once PRN Blood Glucose LESS THAN 70 milliGRAM(s)/deciliter  glucagon  Injectable 1 milliGRAM(s) IntraMuscular once PRN Glucose LESS THAN 70 milligrams/deciliter  ondansetron Injectable 4 milliGRAM(s) IV Push every 6 hours PRN Nausea and/or Vomiting    CAPILLARY BLOOD GLUCOSE  POCT Blood Glucose.: 213 mg/dL (01 Apr 2020 12:25)  POCT Blood Glucose.: 183 mg/dL (01 Apr 2020 08:34)  POCT Blood Glucose.: 123 mg/dL (01 Apr 2020 02:00)  POCT Blood Glucose.: 140 mg/dL (31 Mar 2020 23:03)  POCT Blood Glucose.: 98 mg/dL (31 Mar 2020 21:55)  POCT Blood Glucose.: 189 mg/dL (31 Mar 2020 17:19)    I&O's Summary    T(C): 36.9 (04-01-20 @ 15:12), Max: 38 (03-31-20 @ 16:51)  HR: 97 (04-01-20 @ 15:12) (92 - 100)  BP: 129/77 (04-01-20 @ 15:12) (125/65 - 156/88)  RR: 22 (04-01-20 @ 15:12) (19 - 22)  SpO2: 92% (04-01-20 @ 15:12) (92% - 94%)  On 4 L/min via nasal cannula    PHYSICAL EXAM:  GENERAL: NAD, well-developed  HEAD:  Atraumatic, Normocephalic  EYES: EOMI, PERRLA, conjunctiva and sclera clear  NECK: Supple, No JVD  CHEST/LUNG:  HEART: Regular rate and rhythm  ABDOMEN: Soft, Nontender, Nondistended  EXTREMITIES:   PSYCH: AAOx3  NEUROLOGY: non-focal  SKIN: No rashes or lesions    LABS:                        13.5   10.44 )-----------( 372      ( 31 Mar 2020 06:40 )             43.0     03-31    140  |  95<L>  |  22  ----------------------------<  399<H>  4.9   |  30  |  0.74    Ca    9.6      31 Mar 2020 06:40  Phos  3.2     03-31  Mg     2.6     03-31    TPro  7.9  /  Alb  4.3  /  TBili  0.3  /  DBili  x   /  AST  64<H>  /  ALT  42<H>  /  AlkPhos  80  03-31    ECG: NSR at 93 BPM, left anterior fascicular block, QTc 447 ms

## 2020-04-01 NOTE — CHART NOTE - NSCHARTNOTEFT_GEN_A_CORE
Notified by RN patient complaining of mild chest "pulling" in chest and b/l shoulder blades. VSS stable. Pt denies SOB, palpitations. Pain is mild and not radiating. Pain resolved on its own after 30 mins. EKG done, new t wave inversions in leads AVF and II. Will repeat EKG at 9 am. Continue to monitor patient.     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  Restrictive pulmonary impairment  CKD IV  Afib  CAD  Pneumonia - strep bovis sepsis    INTERVAL HPI/OVERNIGHT EVENTS:  Doing well     MEDICATIONS  (STANDING):  ALBUTerol/ipratropium for Nebulization 3 milliLiter(s) Nebulizer every 6 hours  amiodarone    Tablet 200 milliGRAM(s) Oral daily  ascorbic acid 500 milliGRAM(s) Oral daily  aspirin  chewable 81 milliGRAM(s) Oral daily  atorvastatin 10 milliGRAM(s) Oral at bedtime  cefTRIAXone   IVPB      docusate sodium 100 milliGRAM(s) Oral two times a day  ferrous    sulfate 325 milliGRAM(s) Oral daily  furosemide    Tablet 40 milliGRAM(s) Oral daily  guaiFENesin ER 1200 milliGRAM(s) Oral every 12 hours  hemorrhoidal Ointment 1 Application(s) Rectal three times a day  lidocaine   Patch 1 Patch Transdermal every 24 hours  predniSONE   Tablet 40 milliGRAM(s) Oral daily  rivaroxaban 15 milliGRAM(s) Oral every 24 hours  saccharomyces boulardii 250 milliGRAM(s) Oral two times a day  senna 2 Tablet(s) Oral at bedtime      MEDICATIONS  (PRN):  acetaminophen   Tablet. 650 milliGRAM(s) Oral every 6 hours PRN Mild Pain (1 - 3)  oxyCODONE    5 mG/acetaminophen 325 mG 1 Tablet(s) Oral every 6 hours PRN Moderate Pain (4 - 6)  polyethylene glycol 3350 17 Gram(s) Oral daily PRN Constipation      Allergies    No Known Allergies    Intolerances        PAST MEDICAL & SURGICAL HISTORY:  COPD (chronic obstructive pulmonary disease)  Hyperlipidemia, unspecified hyperlipidemia type  Essential hypertension  Afib  Chronic systolic CHF (congestive heart failure)  Hypertrophic cardiomyopathy  CKD (chronic kidney disease), stage 4 (severe)  Gassiness  Diabetes  Asthma  Prostate cancer  Hypertension  No significant past surgical history  History of valvuloplasty  History of knee surgery      SOCIAL HISTORY  Smoking History:       REVIEW OF SYSTEMS:    CONSTITUTIONAL:  No distress    HEENT:  Eyes:  No diplopia or blurred vision. ENT:  No earache, sore throat or runny nose.    CARDIOVASCULAR:  No pressure, squeezing, tightness, heaviness or aching about the chest; no palpitations.    RESPIRATORY:  No PND or orthopnea. Mild SOBOE    GASTROINTESTINAL:  No nausea, vomiting or diarrhea.    GENITOURINARY:  No dysuria, frequency or urgency.    NEUROLOGIC:  No paresthesias, fasciculations, seizures or weakness.    PSYCHIATRIC:  No disorder of thought or mood.    Vital Signs Last 24 Hrs  T(C): 36.3 (02 Apr 2018 07:42), Max: 37.2 (01 Apr 2018 15:46)  T(F): 97.3 (02 Apr 2018 07:42), Max: 98.9 (01 Apr 2018 15:46)  HR: 60 (02 Apr 2018 07:42) (60 - 71)  BP: 119/75 (02 Apr 2018 07:42) (119/75 - 124/78)  BP(mean): --  RR: 18 (02 Apr 2018 07:42) (17 - 18)  SpO2: 100% (02 Apr 2018 08:55) (96% - 100%)    PHYSICAL EXAMINATION:    GENERAL: The patient is awake and alert in no apparent distress.     HEENT: Head is normocephalic and atraumatic. Extraocular muscles are intact. Mucous membranes are moist.    NECK: Supple.    LUNGS: Clear to auscultation without wheezing, rales or rhonchi; respirations unlabored    HEART: Regular rate and rhythm without murmur.    ABDOMEN: Soft, nontender, and nondistended.      EXTREMITIES: Without any cyanosis, clubbing, rash, lesions or edema.    NEUROLOGIC: Grossly intact.    LABS:        MICROBIOLOGY: Strep bovis    RADIOLOGY & ADDITIONAL STUDIES:    CXR on 3/29/18  RLL infiltrate

## 2020-04-01 NOTE — PROGRESS NOTE ADULT - PROBLEM SELECTOR PLAN 6
- Carbohydrate restricted diet  - Increase Lantus dosing to 60 units for improved glycemic control   - Continue with current lantus dose  - Will titrate further pending FSBS values over the next 24 hrs.

## 2020-04-01 NOTE — CHART NOTE - NSCHARTNOTEFT_GEN_A_CORE
Late Note Entry    Per RN patient refused 60 units of Lantus because fingerstick was 97. S/w patient, refuses 60 units, will only take 15 units. Explained the risks and benefits. Pt verbalizes understanding. Will only accept 15 u Lantus. Will order accordingly and monitor fingersticks closely.

## 2020-04-01 NOTE — PROVIDER CONTACT NOTE (MEDICATION) - SITUATION
Pt refusing to take lantus as ordered. Pts FS was 98. encouraged PO intake FS up to 140. Pt refusing lantus dose however.

## 2020-04-01 NOTE — PROGRESS NOTE ADULT - PROBLEM SELECTOR PLAN 8
- Start on lovenox 40 mg s/c daily - Likely secondary to ongoing increase work of breathing secondary to COVID infection.   - Will obtain serial cardiac enzyme and ECG for now  - No changes noted between ECG from this morning to now.

## 2020-04-02 LAB
ALBUMIN SERPL ELPH-MCNC: 3.6 G/DL — SIGNIFICANT CHANGE UP (ref 3.3–5)
ALP SERPL-CCNC: 102 U/L — SIGNIFICANT CHANGE UP (ref 40–120)
ALT FLD-CCNC: 40 U/L — HIGH (ref 4–33)
ANION GAP SERPL CALC-SCNC: 13 MMO/L — SIGNIFICANT CHANGE UP (ref 7–14)
AST SERPL-CCNC: 46 U/L — HIGH (ref 4–32)
BILIRUB SERPL-MCNC: 0.5 MG/DL — SIGNIFICANT CHANGE UP (ref 0.2–1.2)
BUN SERPL-MCNC: 15 MG/DL — SIGNIFICANT CHANGE UP (ref 7–23)
CALCIUM SERPL-MCNC: 9.9 MG/DL — SIGNIFICANT CHANGE UP (ref 8.4–10.5)
CHLORIDE SERPL-SCNC: 97 MMOL/L — LOW (ref 98–107)
CO2 SERPL-SCNC: 32 MMOL/L — HIGH (ref 22–31)
CREAT SERPL-MCNC: 0.61 MG/DL — SIGNIFICANT CHANGE UP (ref 0.5–1.3)
GLUCOSE SERPL-MCNC: 125 MG/DL — HIGH (ref 70–99)
POTASSIUM SERPL-MCNC: 3.8 MMOL/L — SIGNIFICANT CHANGE UP (ref 3.5–5.3)
POTASSIUM SERPL-SCNC: 3.8 MMOL/L — SIGNIFICANT CHANGE UP (ref 3.5–5.3)
PROT SERPL-MCNC: 8 G/DL — SIGNIFICANT CHANGE UP (ref 6–8.3)
SODIUM SERPL-SCNC: 142 MMOL/L — SIGNIFICANT CHANGE UP (ref 135–145)

## 2020-04-02 PROCEDURE — 99233 SBSQ HOSP IP/OBS HIGH 50: CPT

## 2020-04-02 RX ADMIN — ATORVASTATIN CALCIUM 40 MILLIGRAM(S): 80 TABLET, FILM COATED ORAL at 22:49

## 2020-04-02 RX ADMIN — Medication 3 UNIT(S): at 13:25

## 2020-04-02 RX ADMIN — CEFTRIAXONE 100 MILLIGRAM(S): 500 INJECTION, POWDER, FOR SOLUTION INTRAMUSCULAR; INTRAVENOUS at 17:37

## 2020-04-02 RX ADMIN — ALBUTEROL 2 PUFF(S): 90 AEROSOL, METERED ORAL at 20:37

## 2020-04-02 RX ADMIN — Medication 650 MILLIGRAM(S): at 05:52

## 2020-04-02 RX ADMIN — ENOXAPARIN SODIUM 40 MILLIGRAM(S): 100 INJECTION SUBCUTANEOUS at 13:26

## 2020-04-02 RX ADMIN — Medication 200 MILLIGRAM(S): at 13:25

## 2020-04-02 RX ADMIN — AMLODIPINE BESYLATE 10 MILLIGRAM(S): 2.5 TABLET ORAL at 05:52

## 2020-04-02 RX ADMIN — Medication 500 MILLIGRAM(S): at 17:37

## 2020-04-02 RX ADMIN — Medication 500 MILLIGRAM(S): at 05:52

## 2020-04-02 RX ADMIN — Medication 200 MILLIGRAM(S): at 22:49

## 2020-04-02 RX ADMIN — Medication 3 UNIT(S): at 08:56

## 2020-04-02 RX ADMIN — Medication 2: at 13:27

## 2020-04-02 RX ADMIN — Medication 200 MILLIGRAM(S): at 13:26

## 2020-04-02 NOTE — PROGRESS NOTE ADULT - PROBLEM SELECTOR PLAN 6
- Carbohydrate restricted diet  - continue with current Lantus dosing  - Continue with current lantus dose  - Will titrate further pending FSBS values over the next 24 hrs.

## 2020-04-02 NOTE — PROGRESS NOTE ADULT - PROBLEM SELECTOR PLAN 8
- Likely secondary to ongoing increase work of breathing secondary to COVID infection.   - Will obtain serial cardiac enzyme and ECG for now  - No changes noted between ECG from this morning to now.

## 2020-04-02 NOTE — PROGRESS NOTE ADULT - PROBLEM SELECTOR PLAN 1
- Likely secondary to COVID-19 infection  - Continue with supplemental O2  - Prone position  - Incentive spirometry

## 2020-04-02 NOTE — PROGRESS NOTE ADULT - SUBJECTIVE AND OBJECTIVE BOX
SUBJECTIVE / OVERNIGHT EVENTS:  No overnight events. No new AM Complaints.     MEDICATIONS  (STANDING):  amLODIPine   Tablet 10 milliGRAM(s) Oral daily  ascorbic acid 500 milliGRAM(s) Oral two times a day  atorvastatin 40 milliGRAM(s) Oral at bedtime  cefTRIAXone   IVPB 1000 milliGRAM(s) IV Intermittent every 24 hours  dextrose 5%. 1000 milliLiter(s) (50 mL/Hr) IV Continuous <Continuous>  dextrose 50% Injectable 12.5 Gram(s) IV Push once  dextrose 50% Injectable 25 Gram(s) IV Push once  dextrose 50% Injectable 25 Gram(s) IV Push once  enoxaparin Injectable 40 milliGRAM(s) SubCutaneous daily  hydroxychloroquine   Oral   hydroxychloroquine 200 milliGRAM(s) Oral every 12 hours  influenza   Vaccine 0.5 milliLiter(s) IntraMuscular once  insulin glargine Injectable (LANTUS) 60 Unit(s) SubCutaneous at bedtime  insulin lispro (HumaLOG) corrective regimen sliding scale   SubCutaneous at bedtime  insulin lispro (HumaLOG) corrective regimen sliding scale   SubCutaneous three times a day before meals  insulin lispro Injectable (HumaLOG) 3 Unit(s) SubCutaneous three times a day before meals  thiamine 200 milliGRAM(s) Oral daily    MEDICATIONS  (PRN):  acetaminophen   Tablet .. 650 milliGRAM(s) Oral every 6 hours PRN Temp greater or equal to 38C (100.4F), Mild Pain (1 - 3), Moderate Pain (4 - 6)  ALBUTerol    90 MICROgram(s) HFA Inhaler 2 Puff(s) Inhalation every 6 hours PRN Shortness of Breath  aluminum hydroxide/magnesium hydroxide/simethicone Suspension 30 milliLiter(s) Oral every 8 hours PRN Dyspepsia  dextrose 40% Gel 15 Gram(s) Oral once PRN Blood Glucose LESS THAN 70 milliGRAM(s)/deciliter  glucagon  Injectable 1 milliGRAM(s) IntraMuscular once PRN Glucose LESS THAN 70 milligrams/deciliter  ondansetron Injectable 4 milliGRAM(s) IV Push every 6 hours PRN Nausea and/or Vomiting      CAPILLARY BLOOD GLUCOSE      POCT Blood Glucose.: 123 mg/dL (02 Apr 2020 17:19)  POCT Blood Glucose.: 151 mg/dL (02 Apr 2020 11:53)  POCT Blood Glucose.: 106 mg/dL (02 Apr 2020 08:20)  POCT Blood Glucose.: 171 mg/dL (01 Apr 2020 21:14)  POCT Blood Glucose.: 147 mg/dL (01 Apr 2020 17:24)    I&O's Summary    T(C): 37.3 (04-02-20 @ 05:51), Max: 37.3 (04-02-20 @ 05:51)  HR: 97 (04-02-20 @ 05:51) (92 - 97)  BP: 129/69 (04-02-20 @ 05:51) (129/69 - 129/76)  RR: 20 (04-02-20 @ 05:51) (20 - 20)  SpO2: 94% (04-02-20 @ 05:51) (94% - 94%)  ON 6 L/min via nasal cannula    PHYSICAL EXAM:  GENERAL: NAD, well-developed  HEAD:  Atraumatic, Normocephalic  EYES: EOMI, PERRLA, conjunctiva and sclera clear  NECK: Supple, No JVD  ENT:   CHEST/LUNG:   HEART: Regular rate and rhythm  ABDOMEN: Soft, Nontender, Nondistended  EXTREMITIES: Full range of motion in all extremities.   PSYCH: AAOx3  NEUROLOGY: non-focal  SKIN: No rashes or lesions    LABS:    04-02    142  |  97<L>  |  15  ----------------------------<  125<H>  3.8   |  32<H>  |  0.61    Ca    9.9      02 Apr 2020 06:00  Phos  4.1     04-01  Mg     2.6     04-01    TPro  8.0  /  Alb  3.6  /  TBili  0.5  /  DBili  x   /  AST  46<H>  /  ALT  40<H>  /  AlkPhos  102  04-02      CARDIAC MARKERS ( 01 Apr 2020 15:51 )  x     / x     / 116 u/L / x     / x

## 2020-04-03 DIAGNOSIS — B37.0 CANDIDAL STOMATITIS: ICD-10-CM

## 2020-04-03 LAB
ALBUMIN SERPL ELPH-MCNC: 3.5 G/DL — SIGNIFICANT CHANGE UP (ref 3.3–5)
ALP SERPL-CCNC: 140 U/L — HIGH (ref 40–120)
ALT FLD-CCNC: 39 U/L — HIGH (ref 4–33)
ANION GAP SERPL CALC-SCNC: 14 MMO/L — SIGNIFICANT CHANGE UP (ref 7–14)
ANISOCYTOSIS BLD QL: SLIGHT — SIGNIFICANT CHANGE UP
AST SERPL-CCNC: 53 U/L — HIGH (ref 4–32)
BASOPHILS # BLD AUTO: 0.02 K/UL — SIGNIFICANT CHANGE UP (ref 0–0.2)
BASOPHILS NFR BLD AUTO: 0.3 % — SIGNIFICANT CHANGE UP (ref 0–2)
BASOPHILS NFR SPEC: 0 % — SIGNIFICANT CHANGE UP (ref 0–2)
BILIRUB SERPL-MCNC: 0.6 MG/DL — SIGNIFICANT CHANGE UP (ref 0.2–1.2)
BLASTS # FLD: 0 % — SIGNIFICANT CHANGE UP (ref 0–0)
BUN SERPL-MCNC: 17 MG/DL — SIGNIFICANT CHANGE UP (ref 7–23)
CALCIUM SERPL-MCNC: 10 MG/DL — SIGNIFICANT CHANGE UP (ref 8.4–10.5)
CHLORIDE SERPL-SCNC: 95 MMOL/L — LOW (ref 98–107)
CO2 SERPL-SCNC: 29 MMOL/L — SIGNIFICANT CHANGE UP (ref 22–31)
CREAT SERPL-MCNC: 0.61 MG/DL — SIGNIFICANT CHANGE UP (ref 0.5–1.3)
EOSINOPHIL # BLD AUTO: 0.03 K/UL — SIGNIFICANT CHANGE UP (ref 0–0.5)
EOSINOPHIL NFR BLD AUTO: 0.5 % — SIGNIFICANT CHANGE UP (ref 0–6)
EOSINOPHIL NFR FLD: 0 % — SIGNIFICANT CHANGE UP (ref 0–6)
FERRITIN SERPL-MCNC: 1451 NG/ML — HIGH (ref 15–150)
GIANT PLATELETS BLD QL SMEAR: PRESENT — SIGNIFICANT CHANGE UP
GLUCOSE SERPL-MCNC: 159 MG/DL — HIGH (ref 70–99)
HCT VFR BLD CALC: 39.9 % — SIGNIFICANT CHANGE UP (ref 34.5–45)
HGB BLD-MCNC: 12.6 G/DL — SIGNIFICANT CHANGE UP (ref 11.5–15.5)
HYPOCHROMIA BLD QL: SLIGHT — SIGNIFICANT CHANGE UP
IMM GRANULOCYTES NFR BLD AUTO: 0.8 % — SIGNIFICANT CHANGE UP (ref 0–1.5)
LYMPHOCYTES # BLD AUTO: 0.99 K/UL — LOW (ref 1–3.3)
LYMPHOCYTES # BLD AUTO: 15.1 % — SIGNIFICANT CHANGE UP (ref 13–44)
LYMPHOCYTES NFR SPEC AUTO: 10.4 % — LOW (ref 13–44)
MCHC RBC-ENTMCNC: 26.7 PG — LOW (ref 27–34)
MCHC RBC-ENTMCNC: 31.6 % — LOW (ref 32–36)
MCV RBC AUTO: 84.5 FL — SIGNIFICANT CHANGE UP (ref 80–100)
METAMYELOCYTES # FLD: 0 % — SIGNIFICANT CHANGE UP (ref 0–1)
MICROCYTES BLD QL: SLIGHT — SIGNIFICANT CHANGE UP
MONOCYTES # BLD AUTO: 0.55 K/UL — SIGNIFICANT CHANGE UP (ref 0–0.9)
MONOCYTES NFR BLD AUTO: 8.4 % — SIGNIFICANT CHANGE UP (ref 2–14)
MONOCYTES NFR BLD: 9.6 % — HIGH (ref 2–9)
MYELOCYTES NFR BLD: 0 % — SIGNIFICANT CHANGE UP (ref 0–0)
NEUTROPHIL AB SER-ACNC: 74.8 % — SIGNIFICANT CHANGE UP (ref 43–77)
NEUTROPHILS # BLD AUTO: 4.91 K/UL — SIGNIFICANT CHANGE UP (ref 1.8–7.4)
NEUTROPHILS NFR BLD AUTO: 74.9 % — SIGNIFICANT CHANGE UP (ref 43–77)
NEUTS BAND # BLD: 0 % — SIGNIFICANT CHANGE UP (ref 0–6)
NRBC # FLD: 0 K/UL — SIGNIFICANT CHANGE UP (ref 0–0)
OTHER - HEMATOLOGY %: 0 — SIGNIFICANT CHANGE UP
PLATELET # BLD AUTO: 507 K/UL — HIGH (ref 150–400)
PLATELET COUNT - ESTIMATE: SIGNIFICANT CHANGE UP
PMV BLD: 9.6 FL — SIGNIFICANT CHANGE UP (ref 7–13)
POLYCHROMASIA BLD QL SMEAR: SLIGHT — SIGNIFICANT CHANGE UP
POTASSIUM SERPL-MCNC: 4.3 MMOL/L — SIGNIFICANT CHANGE UP (ref 3.5–5.3)
POTASSIUM SERPL-SCNC: 4.3 MMOL/L — SIGNIFICANT CHANGE UP (ref 3.5–5.3)
PROMYELOCYTES # FLD: 0 % — SIGNIFICANT CHANGE UP (ref 0–0)
PROT SERPL-MCNC: 8.1 G/DL — SIGNIFICANT CHANGE UP (ref 6–8.3)
RBC # BLD: 4.72 M/UL — SIGNIFICANT CHANGE UP (ref 3.8–5.2)
RBC # FLD: 13.4 % — SIGNIFICANT CHANGE UP (ref 10.3–14.5)
SODIUM SERPL-SCNC: 138 MMOL/L — SIGNIFICANT CHANGE UP (ref 135–145)
VARIANT LYMPHS # BLD: 5.2 % — SIGNIFICANT CHANGE UP
WBC # BLD: 6.55 K/UL — SIGNIFICANT CHANGE UP (ref 3.8–10.5)
WBC # FLD AUTO: 6.55 K/UL — SIGNIFICANT CHANGE UP (ref 3.8–10.5)

## 2020-04-03 PROCEDURE — 99233 SBSQ HOSP IP/OBS HIGH 50: CPT

## 2020-04-03 RX ORDER — NYSTATIN 500MM UNIT
500000 POWDER (EA) MISCELLANEOUS EVERY 8 HOURS
Refills: 0 | Status: DISCONTINUED | OUTPATIENT
Start: 2020-04-03 | End: 2020-04-07

## 2020-04-03 RX ORDER — HYDROXYZINE HCL 10 MG
25 TABLET ORAL ONCE
Refills: 0 | Status: DISCONTINUED | OUTPATIENT
Start: 2020-04-03 | End: 2020-04-07

## 2020-04-03 RX ADMIN — Medication 500000 UNIT(S): at 17:21

## 2020-04-03 RX ADMIN — Medication 3 UNIT(S): at 17:20

## 2020-04-03 RX ADMIN — Medication 200 MILLIGRAM(S): at 12:10

## 2020-04-03 RX ADMIN — Medication 200 MILLIGRAM(S): at 12:11

## 2020-04-03 RX ADMIN — Medication 30 MILLILITER(S): at 00:26

## 2020-04-03 RX ADMIN — Medication 3: at 12:48

## 2020-04-03 RX ADMIN — ALBUTEROL 2 PUFF(S): 90 AEROSOL, METERED ORAL at 21:49

## 2020-04-03 RX ADMIN — CEFTRIAXONE 100 MILLIGRAM(S): 500 INJECTION, POWDER, FOR SOLUTION INTRAMUSCULAR; INTRAVENOUS at 19:12

## 2020-04-03 RX ADMIN — ALBUTEROL 2 PUFF(S): 90 AEROSOL, METERED ORAL at 12:46

## 2020-04-03 RX ADMIN — Medication 3 UNIT(S): at 08:35

## 2020-04-03 RX ADMIN — ENOXAPARIN SODIUM 40 MILLIGRAM(S): 100 INJECTION SUBCUTANEOUS at 12:11

## 2020-04-03 RX ADMIN — INSULIN GLARGINE 60 UNIT(S): 100 INJECTION, SOLUTION SUBCUTANEOUS at 21:46

## 2020-04-03 RX ADMIN — AMLODIPINE BESYLATE 10 MILLIGRAM(S): 2.5 TABLET ORAL at 05:23

## 2020-04-03 RX ADMIN — Medication 500 MILLIGRAM(S): at 17:21

## 2020-04-03 RX ADMIN — Medication 200 MILLIGRAM(S): at 21:46

## 2020-04-03 RX ADMIN — Medication 3 UNIT(S): at 12:51

## 2020-04-03 RX ADMIN — Medication 3: at 08:35

## 2020-04-03 RX ADMIN — ONDANSETRON 4 MILLIGRAM(S): 8 TABLET, FILM COATED ORAL at 05:23

## 2020-04-03 RX ADMIN — ATORVASTATIN CALCIUM 40 MILLIGRAM(S): 80 TABLET, FILM COATED ORAL at 21:46

## 2020-04-03 RX ADMIN — Medication 500 MILLIGRAM(S): at 05:23

## 2020-04-03 RX ADMIN — Medication 2: at 17:19

## 2020-04-03 NOTE — PROGRESS NOTE ADULT - PROBLEM SELECTOR PLAN 1
- Likely secondary to COVID-19 infection  - Continue with supplemental O2 and wean off as tolerated  - Prone position  - Incentive spirometry

## 2020-04-03 NOTE — PROVIDER CONTACT NOTE (OTHER) - SITUATION
Patient complaining of discomfort on her tongue.
Patient refusing lantus, states she did not eat dinner
Pt complaining of moderate midsternal squeezing pain as well as in BL shoulder blades. pt was repositioned had a slight cough when pt experienced onset of pain.
pt fingerstick 421
temperature of 100.7

## 2020-04-03 NOTE — PROVIDER CONTACT NOTE (OTHER) - ACTION/TREATMENT ORDERED:
tylenol and ice packs given
ekg; will come assess at bedside
Provider will come and assess patient.
give insulin as needed
ok to refuse insulin

## 2020-04-03 NOTE — PROVIDER CONTACT NOTE (OTHER) - RECOMMENDATIONS
Patient educated on risk benefit of lantus insulin, continues to refuse
Provider to assess patient.
insulin adjusted
give tylenol and ice packs

## 2020-04-03 NOTE — PROVIDER CONTACT NOTE (OTHER) - REASON
Patient is complaining of mouth discomfort.
Patient refusing lantus
Pt complaining of moderate midsternal squeezing pain
fingerstick
temperature

## 2020-04-03 NOTE — PROVIDER CONTACT NOTE (OTHER) - BACKGROUND
Patient admitted covid positive with history of type 2 diabetes
Patient admitted for hypoxemia. Diet Regular consistent carb with evening snack, no dairy or lactose.
admitted with covid 19 hx diabetes
hypoexmia
admitted with covid 19

## 2020-04-03 NOTE — PROVIDER CONTACT NOTE (OTHER) - NAME OF MD/NP/PA/DO NOTIFIED:
ACP OMAR diaz
Deven Susanne [id number]
OMAR Casey [facility], [id number]
monica diaz
Krzysztof Susanne [phone]

## 2020-04-03 NOTE — PROVIDER CONTACT NOTE (OTHER) - ASSESSMENT
Patient blood glucose 186, A&OX4
Patient's tongue appears white. Patient states discomfort swallowing.
Pt complaining of moderate midsternal squeezing pain as well as in BL shoulder blades. pt was repositioned had a slight cough when pt experienced onset of pain
awake alert denies any  symptoms
awake alert  vss feels cold

## 2020-04-03 NOTE — CHART NOTE - NSCHARTNOTEFT_GEN_A_CORE
Mr. Alexander is a 54 yr old HTN, DM, asthma p/w SOB, cough and CP -> + COVID. On Hydroxycholoroquine 200mg.     Reviewed [employer] telemetry strips from 4/1/20..   Remains in SR 80's with QTc 432 ms. QTc - WNL.   Continue current management.     Thank you.   Stephenie Bahena,FNP- C  [id number] Mr. Alexander is a 54 yr old HTN, DM, asthma p/w SOB, cough and CP -> + COVID. On hydroxychloroquine 200mg.     Reviewed [employer] telemetry strips from 4/1/20..   Remains in SR 80's with QTc 432 ms. QTc - WNL.   Continue current management.     Thank you.   Stephenie Bahena,FNP- C  [id number]    Sensor charged for 90 minutes and new patch placed. Monitor also charged.  Will d/c patch on Monday 4/6/2020.     BG

## 2020-04-03 NOTE — PROGRESS NOTE ADULT - SUBJECTIVE AND OBJECTIVE BOX
SUBJECTIVE / OVERNIGHT EVENTS:  No overnight events. Patient tongue discomfort since this morning and difficulty with swallowing. She reports improvements in her respiratory status.     MEDICATIONS  (STANDING):  amLODIPine   Tablet 10 milliGRAM(s) Oral daily  ascorbic acid 500 milliGRAM(s) Oral two times a day  atorvastatin 40 milliGRAM(s) Oral at bedtime  cefTRIAXone   IVPB 1000 milliGRAM(s) IV Intermittent every 24 hours  dextrose 5%. 1000 milliLiter(s) (50 mL/Hr) IV Continuous <Continuous>  dextrose 50% Injectable 12.5 Gram(s) IV Push once  dextrose 50% Injectable 25 Gram(s) IV Push once  dextrose 50% Injectable 25 Gram(s) IV Push once  enoxaparin Injectable 40 milliGRAM(s) SubCutaneous daily  hydroxychloroquine   Oral   hydroxychloroquine 200 milliGRAM(s) Oral every 12 hours  influenza   Vaccine 0.5 milliLiter(s) IntraMuscular once  insulin glargine Injectable (LANTUS) 60 Unit(s) SubCutaneous at bedtime  insulin lispro (HumaLOG) corrective regimen sliding scale   SubCutaneous at bedtime  insulin lispro (HumaLOG) corrective regimen sliding scale   SubCutaneous three times a day before meals  insulin lispro Injectable (HumaLOG) 3 Unit(s) SubCutaneous three times a day before meals  nystatin    Suspension 036245 Unit(s) Oral every 8 hours  thiamine 200 milliGRAM(s) Oral daily    MEDICATIONS  (PRN):  acetaminophen   Tablet .. 650 milliGRAM(s) Oral every 6 hours PRN Temp greater or equal to 38C (100.4F), Mild Pain (1 - 3), Moderate Pain (4 - 6)  ALBUTerol    90 MICROgram(s) HFA Inhaler 2 Puff(s) Inhalation every 6 hours PRN Shortness of Breath  aluminum hydroxide/magnesium hydroxide/simethicone Suspension 30 milliLiter(s) Oral every 8 hours PRN Dyspepsia  dextrose 40% Gel 15 Gram(s) Oral once PRN Blood Glucose LESS THAN 70 milliGRAM(s)/deciliter  glucagon  Injectable 1 milliGRAM(s) IntraMuscular once PRN Glucose LESS THAN 70 milligrams/deciliter  ondansetron Injectable 4 milliGRAM(s) IV Push every 6 hours PRN Nausea and/or Vomiting      CAPILLARY BLOOD GLUCOSE  POCT Blood Glucose.: 214 mg/dL (03 Apr 2020 12:32)  POCT Blood Glucose.: 233 mg/dL (03 Apr 2020 08:15)  POCT Blood Glucose.: 186 mg/dL (02 Apr 2020 22:42)  POCT Blood Glucose.: 123 mg/dL (02 Apr 2020 17:19)    I&O's Summary    T(C): 37.1 (04-03-20 @ 05:22), Max: 37.1 (04-03-20 @ 05:22)  HR: 90 (04-03-20 @ 05:22) (90 - 95)  BP: 136/82 (04-03-20 @ 05:22) (124/64 - 136/82)  RR: 19 (04-03-20 @ 05:22) (18 - 19)  SpO2: 94% (04-03-20 @ 05:22) (93% - 94%)  On 6 L/min nasal cannula    PHYSICAL EXAM:  GENERAL: NAD, well-developed  HEAD:  Atraumatic, Normocephalic  EYES: EOMI, PERRLA, conjunctiva and sclera clear  Mouth: White plaques noted on tongue  NECK: Supple, No JVD  ENT: Supplemental O2 via nasal cannula   CHEST/LUNG:   HEART: Regular rate and rhythm  ABDOMEN: Soft, Nontender, Nondistended  EXTREMITIES: Full range of motion in all extremities.   PSYCH: AAOx3  NEUROLOGY: non-focal  SKIN: No rashes or lesions    LABS:                        12.6   6.55  )-----------( 507      ( 03 Apr 2020 05:00 )             39.9     04-03    138  |  95<L>  |  17  ----------------------------<  159<H>  4.3   |  29  |  0.61    Ca    10.0      03 Apr 2020 05:00  Phos  4.1     04-01  Mg     2.6     04-01    TPro  8.1  /  Alb  3.5  /  TBili  0.6  /  DBili  x   /  AST  53<H>  /  ALT  39<H>  /  AlkPhos  140<H>  04-03      CARDIAC MARKERS ( 01 Apr 2020 15:51 )  x     / x     / 116 u/L / x     / x

## 2020-04-04 LAB — FERRITIN SERPL-MCNC: 1406 NG/ML — HIGH (ref 15–150)

## 2020-04-04 PROCEDURE — 99233 SBSQ HOSP IP/OBS HIGH 50: CPT

## 2020-04-04 RX ADMIN — INSULIN GLARGINE 60 UNIT(S): 100 INJECTION, SOLUTION SUBCUTANEOUS at 22:40

## 2020-04-04 RX ADMIN — Medication 500000 UNIT(S): at 05:21

## 2020-04-04 RX ADMIN — Medication 200 MILLIGRAM(S): at 12:40

## 2020-04-04 RX ADMIN — Medication 2: at 17:55

## 2020-04-04 RX ADMIN — Medication 3 UNIT(S): at 12:41

## 2020-04-04 RX ADMIN — Medication 500000 UNIT(S): at 00:07

## 2020-04-04 RX ADMIN — Medication 500000 UNIT(S): at 22:40

## 2020-04-04 RX ADMIN — ENOXAPARIN SODIUM 40 MILLIGRAM(S): 100 INJECTION SUBCUTANEOUS at 12:39

## 2020-04-04 RX ADMIN — AMLODIPINE BESYLATE 10 MILLIGRAM(S): 2.5 TABLET ORAL at 05:21

## 2020-04-04 RX ADMIN — Medication 500 MILLIGRAM(S): at 17:57

## 2020-04-04 RX ADMIN — Medication 3 UNIT(S): at 09:08

## 2020-04-04 RX ADMIN — Medication 500000 UNIT(S): at 17:57

## 2020-04-04 RX ADMIN — ATORVASTATIN CALCIUM 40 MILLIGRAM(S): 80 TABLET, FILM COATED ORAL at 21:32

## 2020-04-04 RX ADMIN — Medication 2: at 12:41

## 2020-04-04 RX ADMIN — Medication 3 UNIT(S): at 17:56

## 2020-04-04 RX ADMIN — Medication 100 MILLIGRAM(S): at 23:48

## 2020-04-04 RX ADMIN — Medication 500 MILLIGRAM(S): at 05:21

## 2020-04-04 NOTE — PROGRESS NOTE ADULT - PROBLEM SELECTOR PLAN 1
- Likely secondary to COVID-19 infection  - Continue with supplemental O2 via nasal cannula  - Prone position  - Incentive spirometry - Likely secondary to COVID-19 infection  - Continue with supplemental O2 via nasal cannula  - Prone position  - Incentive spirometry  - Obtain AM labs (CBC, CMP, Mag, Phos, CRP, Procalcitonin, and Ferritin)

## 2020-04-04 NOTE — PROGRESS NOTE ADULT - SUBJECTIVE AND OBJECTIVE BOX
SUBJECTIVE / OVERNIGHT EVENTS:  No overnight events. No new AM complaints. Patient reports significant improvements in her SOB. No other complaints at present.     MEDICATIONS  (STANDING):  amLODIPine   Tablet 10 milliGRAM(s) Oral daily  ascorbic acid 500 milliGRAM(s) Oral two times a day  atorvastatin 40 milliGRAM(s) Oral at bedtime  dextrose 5%. 1000 milliLiter(s) (50 mL/Hr) IV Continuous <Continuous>  dextrose 50% Injectable 12.5 Gram(s) IV Push once  dextrose 50% Injectable 25 Gram(s) IV Push once  dextrose 50% Injectable 25 Gram(s) IV Push once  enoxaparin Injectable 40 milliGRAM(s) SubCutaneous daily  influenza   Vaccine 0.5 milliLiter(s) IntraMuscular once  insulin glargine Injectable (LANTUS) 60 Unit(s) SubCutaneous at bedtime  insulin lispro (HumaLOG) corrective regimen sliding scale   SubCutaneous at bedtime  insulin lispro (HumaLOG) corrective regimen sliding scale   SubCutaneous three times a day before meals  insulin lispro Injectable (HumaLOG) 3 Unit(s) SubCutaneous three times a day before meals  nystatin    Suspension 758592 Unit(s) Oral every 8 hours  thiamine 200 milliGRAM(s) Oral daily    MEDICATIONS  (PRN):  acetaminophen   Tablet .. 650 milliGRAM(s) Oral every 6 hours PRN Temp greater or equal to 38C (100.4F), Mild Pain (1 - 3), Moderate Pain (4 - 6)  ALBUTerol    90 MICROgram(s) HFA Inhaler 2 Puff(s) Inhalation every 6 hours PRN Shortness of Breath  aluminum hydroxide/magnesium hydroxide/simethicone Suspension 30 milliLiter(s) Oral every 8 hours PRN Dyspepsia  dextrose 40% Gel 15 Gram(s) Oral once PRN Blood Glucose LESS THAN 70 milliGRAM(s)/deciliter  glucagon  Injectable 1 milliGRAM(s) IntraMuscular once PRN Glucose LESS THAN 70 milligrams/deciliter  hydrOXYzine hydrochloride Injectable 25 milliGRAM(s) IntraMuscular once PRN Anxiety  ondansetron Injectable 4 milliGRAM(s) IV Push every 6 hours PRN Nausea and/or Vomiting      CAPILLARY BLOOD GLUCOSE  POCT Blood Glucose.: 173 mg/dL (04 Apr 2020 11:45)  POCT Blood Glucose.: 129 mg/dL (04 Apr 2020 08:45)  POCT Blood Glucose.: 184 mg/dL (03 Apr 2020 21:42)  POCT Blood Glucose.: 185 mg/dL (03 Apr 2020 17:08)    I&O's Summary    T(C): 36.5 (04-04-20 @ 12:54), Max: 37.1 (04-04-20 @ 05:19)  HR: 97 (04-04-20 @ 12:54) (92 - 97)  BP: 130/77 (04-04-20 @ 12:54) (113/58 - 130/77)  RR: 20 (04-04-20 @ 12:54) (20 - 20)  SpO2: 94% (04-04-20 @ 12:54) (94% - 95%)  ON 2 L/min nasal cannula    PHYSICAL EXAM:  GENERAL: NAD, well-developed, Obes  HEAD:  Atraumatic, Normocephalic  EYES: EOMI, PERRLA, conjunctiva and sclera clear  NECK: Supple, No JVD  CHEST/LUNG:   HEART: Regular rate and rhythm  ABDOMEN: Soft, Nontender, Nondistended  EXTREMITIES: Non-edematous b/l LEs.   PSYCH: AAOx3  NEUROLOGY: non-focal  SKIN: No rashes or lesions    LABS:                        12.6   6.55  )-----------( 507      ( 03 Apr 2020 05:00 )             39.9     04-03    138  |  95<L>  |  17  ----------------------------<  159<H>  4.3   |  29  |  0.61    Ca    10.0      03 Apr 2020 05:00    TPro  8.1  /  Alb  3.5  /  TBili  0.6  /  DBili  x   /  AST  53<H>  /  ALT  39<H>  /  AlkPhos  140<H>  04-03

## 2020-04-05 LAB
ANION GAP SERPL CALC-SCNC: 15 MMO/L — HIGH (ref 7–14)
BUN SERPL-MCNC: 13 MG/DL — SIGNIFICANT CHANGE UP (ref 7–23)
CALCIUM SERPL-MCNC: 10.1 MG/DL — SIGNIFICANT CHANGE UP (ref 8.4–10.5)
CHLORIDE SERPL-SCNC: 95 MMOL/L — LOW (ref 98–107)
CO2 SERPL-SCNC: 28 MMOL/L — SIGNIFICANT CHANGE UP (ref 22–31)
CREAT SERPL-MCNC: 0.63 MG/DL — SIGNIFICANT CHANGE UP (ref 0.5–1.3)
GLUCOSE SERPL-MCNC: 141 MG/DL — HIGH (ref 70–99)
HCT VFR BLD CALC: 40.8 % — SIGNIFICANT CHANGE UP (ref 34.5–45)
HGB BLD-MCNC: 12.9 G/DL — SIGNIFICANT CHANGE UP (ref 11.5–15.5)
MAGNESIUM SERPL-MCNC: 2.2 MG/DL — SIGNIFICANT CHANGE UP (ref 1.6–2.6)
MCHC RBC-ENTMCNC: 26.7 PG — LOW (ref 27–34)
MCHC RBC-ENTMCNC: 31.6 % — LOW (ref 32–36)
MCV RBC AUTO: 84.3 FL — SIGNIFICANT CHANGE UP (ref 80–100)
NRBC # FLD: 0 K/UL — SIGNIFICANT CHANGE UP (ref 0–0)
PHOSPHATE SERPL-MCNC: 4.3 MG/DL — SIGNIFICANT CHANGE UP (ref 2.5–4.5)
PLATELET # BLD AUTO: 590 K/UL — HIGH (ref 150–400)
PMV BLD: 9.3 FL — SIGNIFICANT CHANGE UP (ref 7–13)
POTASSIUM SERPL-MCNC: 3.6 MMOL/L — SIGNIFICANT CHANGE UP (ref 3.5–5.3)
POTASSIUM SERPL-SCNC: 3.6 MMOL/L — SIGNIFICANT CHANGE UP (ref 3.5–5.3)
RBC # BLD: 4.84 M/UL — SIGNIFICANT CHANGE UP (ref 3.8–5.2)
RBC # FLD: 13.6 % — SIGNIFICANT CHANGE UP (ref 10.3–14.5)
SODIUM SERPL-SCNC: 138 MMOL/L — SIGNIFICANT CHANGE UP (ref 135–145)
WBC # BLD: 6.06 K/UL — SIGNIFICANT CHANGE UP (ref 3.8–10.5)
WBC # FLD AUTO: 6.06 K/UL — SIGNIFICANT CHANGE UP (ref 3.8–10.5)

## 2020-04-05 PROCEDURE — 99233 SBSQ HOSP IP/OBS HIGH 50: CPT

## 2020-04-05 RX ORDER — POTASSIUM CHLORIDE 20 MEQ
40 PACKET (EA) ORAL EVERY 4 HOURS
Refills: 0 | Status: COMPLETED | OUTPATIENT
Start: 2020-04-05 | End: 2020-04-05

## 2020-04-05 RX ORDER — ATORVASTATIN CALCIUM 80 MG/1
1 TABLET, FILM COATED ORAL
Qty: 0 | Refills: 0 | DISCHARGE
Start: 2020-04-05

## 2020-04-05 RX ORDER — BENZOCAINE AND MENTHOL 5; 1 G/100ML; G/100ML
1 LIQUID ORAL
Refills: 0 | Status: DISCONTINUED | OUTPATIENT
Start: 2020-04-05 | End: 2020-04-07

## 2020-04-05 RX ORDER — ACETAMINOPHEN 500 MG
2 TABLET ORAL
Qty: 0 | Refills: 0 | DISCHARGE
Start: 2020-04-05

## 2020-04-05 RX ADMIN — BENZOCAINE AND MENTHOL 1 LOZENGE: 5; 1 LIQUID ORAL at 17:52

## 2020-04-05 RX ADMIN — ENOXAPARIN SODIUM 40 MILLIGRAM(S): 100 INJECTION SUBCUTANEOUS at 20:42

## 2020-04-05 RX ADMIN — AMLODIPINE BESYLATE 10 MILLIGRAM(S): 2.5 TABLET ORAL at 05:06

## 2020-04-05 RX ADMIN — Medication 40 MILLIEQUIVALENT(S): at 20:41

## 2020-04-05 RX ADMIN — Medication 500000 UNIT(S): at 17:59

## 2020-04-05 RX ADMIN — ATORVASTATIN CALCIUM 40 MILLIGRAM(S): 80 TABLET, FILM COATED ORAL at 20:42

## 2020-04-05 RX ADMIN — Medication 500000 UNIT(S): at 23:28

## 2020-04-05 RX ADMIN — Medication 3 UNIT(S): at 12:45

## 2020-04-05 RX ADMIN — Medication 3 UNIT(S): at 17:50

## 2020-04-05 RX ADMIN — Medication 500 MILLIGRAM(S): at 05:06

## 2020-04-05 RX ADMIN — Medication 200 MILLIGRAM(S): at 12:48

## 2020-04-05 RX ADMIN — Medication 3: at 17:51

## 2020-04-05 RX ADMIN — INSULIN GLARGINE 60 UNIT(S): 100 INJECTION, SOLUTION SUBCUTANEOUS at 21:10

## 2020-04-05 RX ADMIN — Medication 3: at 12:46

## 2020-04-05 RX ADMIN — Medication 500000 UNIT(S): at 09:12

## 2020-04-05 RX ADMIN — Medication 500 MILLIGRAM(S): at 17:52

## 2020-04-05 RX ADMIN — Medication 100 MILLIGRAM(S): at 06:21

## 2020-04-05 RX ADMIN — Medication 3 UNIT(S): at 09:11

## 2020-04-05 RX ADMIN — Medication 40 MILLIEQUIVALENT(S): at 17:58

## 2020-04-05 NOTE — PROGRESS NOTE ADULT - SUBJECTIVE AND OBJECTIVE BOX
SUBJECTIVE / OVERNIGHT EVENTS:  No overnight events. No new AM complaints. Patient reports improvements in her respiratory status.     MEDICATIONS  (STANDING):  amLODIPine   Tablet 10 milliGRAM(s) Oral daily  ascorbic acid 500 milliGRAM(s) Oral two times a day  atorvastatin 40 milliGRAM(s) Oral at bedtime  dextrose 5%. 1000 milliLiter(s) (50 mL/Hr) IV Continuous <Continuous>  dextrose 50% Injectable 12.5 Gram(s) IV Push once  dextrose 50% Injectable 25 Gram(s) IV Push once  dextrose 50% Injectable 25 Gram(s) IV Push once  enoxaparin Injectable 40 milliGRAM(s) SubCutaneous daily  influenza   Vaccine 0.5 milliLiter(s) IntraMuscular once  insulin glargine Injectable (LANTUS) 60 Unit(s) SubCutaneous at bedtime  insulin lispro (HumaLOG) corrective regimen sliding scale   SubCutaneous at bedtime  insulin lispro (HumaLOG) corrective regimen sliding scale   SubCutaneous three times a day before meals  insulin lispro Injectable (HumaLOG) 3 Unit(s) SubCutaneous three times a day before meals  nystatin    Suspension 971176 Unit(s) Oral every 8 hours  potassium chloride    Tablet ER 40 milliEquivalent(s) Oral every 4 hours  thiamine 200 milliGRAM(s) Oral daily    MEDICATIONS  (PRN):  acetaminophen   Tablet .. 650 milliGRAM(s) Oral every 6 hours PRN Temp greater or equal to 38C (100.4F), Mild Pain (1 - 3), Moderate Pain (4 - 6)  ALBUTerol    90 MICROgram(s) HFA Inhaler 2 Puff(s) Inhalation every 6 hours PRN Shortness of Breath  aluminum hydroxide/magnesium hydroxide/simethicone Suspension 30 milliLiter(s) Oral every 8 hours PRN Dyspepsia  dextrose 40% Gel 15 Gram(s) Oral once PRN Blood Glucose LESS THAN 70 milliGRAM(s)/deciliter  glucagon  Injectable 1 milliGRAM(s) IntraMuscular once PRN Glucose LESS THAN 70 milligrams/deciliter  guaiFENesin   Syrup  (Sugar-Free) 100 milliGRAM(s) Oral every 6 hours PRN Cough  hydrOXYzine hydrochloride Injectable 25 milliGRAM(s) IntraMuscular once PRN Anxiety  ondansetron Injectable 4 milliGRAM(s) IV Push every 6 hours PRN Nausea and/or Vomiting      CAPILLARY BLOOD GLUCOSE      POCT Blood Glucose.: 202 mg/dL (05 Apr 2020 11:40)  POCT Blood Glucose.: 132 mg/dL (05 Apr 2020 08:41)  POCT Blood Glucose.: 121 mg/dL (04 Apr 2020 21:31)  POCT Blood Glucose.: 162 mg/dL (04 Apr 2020 16:46)    I&O's Summary    T(C): 37.1 (04-05-20 @ 05:04), Max: 37.1 (04-05-20 @ 05:04)  HR: 92 (04-05-20 @ 09:15) (92 - 100)  BP: 121/63 (04-05-20 @ 09:15) (121/63 - 147/71)  RR: 18 (04-05-20 @ 09:15) (18 - 20)  SpO2: 95% on room air    PHYSICAL EXAM:  GENERAL: NAD, well-developed, obese  HEAD:  Atraumatic, Normocephalic  EYES: EOMI, PERRLA, conjunctiva and sclera clear  NECK: Supple, No JVD  CHEST/LUNG:   HEART: Regular rate and rhythm  ABDOMEN: Soft, Nontender, Nondistended  EXTREMITIES: Non-edematous b/l LEs  PSYCH: AAOx3  NEUROLOGY: non-focal  SKIN: No rashes or lesions    LABS:                        12.9   6.06  )-----------( 590      ( 05 Apr 2020 07:12 )             40.8     04-05    138  |  95<L>  |  13  ----------------------------<  141<H>  3.6   |  28  |  0.63    Ca    10.1      05 Apr 2020 07:12  Phos  4.3     04-05  Mg     2.2     04-05

## 2020-04-05 NOTE — PROGRESS NOTE ADULT - PROBLEM SELECTOR PLAN 1
- Secondary to COVID-19 infection  - Clinically improving  - Continue with supplemental O2 via nasal cannula  - Prone position  - Incentive spirometry  - Discharge planning to home.

## 2020-04-06 LAB
ALBUMIN SERPL ELPH-MCNC: 3.5 G/DL — SIGNIFICANT CHANGE UP (ref 3.3–5)
ALP SERPL-CCNC: 164 U/L — HIGH (ref 40–120)
ALT FLD-CCNC: 59 U/L — HIGH (ref 4–33)
ANION GAP SERPL CALC-SCNC: 14 MMO/L — SIGNIFICANT CHANGE UP (ref 7–14)
AST SERPL-CCNC: 56 U/L — HIGH (ref 4–32)
BILIRUB SERPL-MCNC: 0.6 MG/DL — SIGNIFICANT CHANGE UP (ref 0.2–1.2)
BUN SERPL-MCNC: 12 MG/DL — SIGNIFICANT CHANGE UP (ref 7–23)
CALCIUM SERPL-MCNC: 10.1 MG/DL — SIGNIFICANT CHANGE UP (ref 8.4–10.5)
CHLORIDE SERPL-SCNC: 97 MMOL/L — LOW (ref 98–107)
CO2 SERPL-SCNC: 28 MMOL/L — SIGNIFICANT CHANGE UP (ref 22–31)
CREAT SERPL-MCNC: 0.69 MG/DL — SIGNIFICANT CHANGE UP (ref 0.5–1.3)
GLUCOSE SERPL-MCNC: 116 MG/DL — HIGH (ref 70–99)
HCT VFR BLD CALC: 39.5 % — SIGNIFICANT CHANGE UP (ref 34.5–45)
HGB BLD-MCNC: 12.5 G/DL — SIGNIFICANT CHANGE UP (ref 11.5–15.5)
MCHC RBC-ENTMCNC: 26.9 PG — LOW (ref 27–34)
MCHC RBC-ENTMCNC: 31.6 % — LOW (ref 32–36)
MCV RBC AUTO: 85.1 FL — SIGNIFICANT CHANGE UP (ref 80–100)
NRBC # FLD: 0 K/UL — SIGNIFICANT CHANGE UP (ref 0–0)
PLATELET # BLD AUTO: 658 K/UL — HIGH (ref 150–400)
PMV BLD: 9.3 FL — SIGNIFICANT CHANGE UP (ref 7–13)
POTASSIUM SERPL-MCNC: 4.9 MMOL/L — SIGNIFICANT CHANGE UP (ref 3.5–5.3)
POTASSIUM SERPL-SCNC: 4.9 MMOL/L — SIGNIFICANT CHANGE UP (ref 3.5–5.3)
PROT SERPL-MCNC: 8.1 G/DL — SIGNIFICANT CHANGE UP (ref 6–8.3)
RBC # BLD: 4.64 M/UL — SIGNIFICANT CHANGE UP (ref 3.8–5.2)
RBC # FLD: 13.7 % — SIGNIFICANT CHANGE UP (ref 10.3–14.5)
SODIUM SERPL-SCNC: 139 MMOL/L — SIGNIFICANT CHANGE UP (ref 135–145)
WBC # BLD: 9.17 K/UL — SIGNIFICANT CHANGE UP (ref 3.8–10.5)
WBC # FLD AUTO: 9.17 K/UL — SIGNIFICANT CHANGE UP (ref 3.8–10.5)

## 2020-04-06 PROCEDURE — 99232 SBSQ HOSP IP/OBS MODERATE 35: CPT

## 2020-04-06 RX ADMIN — INSULIN GLARGINE 60 UNIT(S): 100 INJECTION, SOLUTION SUBCUTANEOUS at 22:44

## 2020-04-06 RX ADMIN — Medication 3 UNIT(S): at 17:56

## 2020-04-06 RX ADMIN — Medication 3 UNIT(S): at 12:17

## 2020-04-06 RX ADMIN — Medication 500000 UNIT(S): at 17:56

## 2020-04-06 RX ADMIN — Medication 500 MILLIGRAM(S): at 05:50

## 2020-04-06 RX ADMIN — Medication 500000 UNIT(S): at 22:44

## 2020-04-06 RX ADMIN — AMLODIPINE BESYLATE 10 MILLIGRAM(S): 2.5 TABLET ORAL at 05:50

## 2020-04-06 RX ADMIN — Medication 200 MILLIGRAM(S): at 12:18

## 2020-04-06 RX ADMIN — ATORVASTATIN CALCIUM 40 MILLIGRAM(S): 80 TABLET, FILM COATED ORAL at 22:44

## 2020-04-06 RX ADMIN — Medication 3 UNIT(S): at 09:09

## 2020-04-06 RX ADMIN — Medication 500000 UNIT(S): at 07:32

## 2020-04-06 RX ADMIN — Medication 500 MILLIGRAM(S): at 17:56

## 2020-04-06 RX ADMIN — BENZOCAINE AND MENTHOL 1 LOZENGE: 5; 1 LIQUID ORAL at 07:31

## 2020-04-06 RX ADMIN — Medication 2: at 12:17

## 2020-04-06 NOTE — PROGRESS NOTE ADULT - SUBJECTIVE AND OBJECTIVE BOX
Patient is a 54y old  Female who presents with a chief complaint of sob/ cough/ fever (05 Apr 2020 12:53)      SUBJECTIVE / OVERNIGHT EVENTS: Pt seen and examined at 12:05pm, no overnight events, has some dry cough, some sob, had 2 loose stools today. No other new issues reported.    MEDICATIONS  (STANDING):  amLODIPine   Tablet 10 milliGRAM(s) Oral daily  ascorbic acid 500 milliGRAM(s) Oral two times a day  atorvastatin 40 milliGRAM(s) Oral at bedtime  dextrose 5%. 1000 milliLiter(s) (50 mL/Hr) IV Continuous <Continuous>  dextrose 50% Injectable 12.5 Gram(s) IV Push once  dextrose 50% Injectable 25 Gram(s) IV Push once  dextrose 50% Injectable 25 Gram(s) IV Push once  enoxaparin Injectable 40 milliGRAM(s) SubCutaneous daily  influenza   Vaccine 0.5 milliLiter(s) IntraMuscular once  insulin glargine Injectable (LANTUS) 60 Unit(s) SubCutaneous at bedtime  insulin lispro (HumaLOG) corrective regimen sliding scale   SubCutaneous at bedtime  insulin lispro (HumaLOG) corrective regimen sliding scale   SubCutaneous three times a day before meals  insulin lispro Injectable (HumaLOG) 3 Unit(s) SubCutaneous three times a day before meals  nystatin    Suspension 885092 Unit(s) Oral every 8 hours  thiamine 200 milliGRAM(s) Oral daily    MEDICATIONS  (PRN):  acetaminophen   Tablet .. 650 milliGRAM(s) Oral every 6 hours PRN Temp greater or equal to 38C (100.4F), Mild Pain (1 - 3), Moderate Pain (4 - 6)  ALBUTerol    90 MICROgram(s) HFA Inhaler 2 Puff(s) Inhalation every 6 hours PRN Shortness of Breath  aluminum hydroxide/magnesium hydroxide/simethicone Suspension 30 milliLiter(s) Oral every 8 hours PRN Dyspepsia  benzocaine 15 mG/menthol 3.6 mG (Sugar-Free) Lozenge 1 Lozenge Oral four times a day PRN Sore Throat  dextrose 40% Gel 15 Gram(s) Oral once PRN Blood Glucose LESS THAN 70 milliGRAM(s)/deciliter  glucagon  Injectable 1 milliGRAM(s) IntraMuscular once PRN Glucose LESS THAN 70 milligrams/deciliter  guaiFENesin   Syrup  (Sugar-Free) 100 milliGRAM(s) Oral every 6 hours PRN Cough  hydrOXYzine hydrochloride Injectable 25 milliGRAM(s) IntraMuscular once PRN Anxiety  ondansetron Injectable 4 milliGRAM(s) IV Push every 6 hours PRN Nausea and/or Vomiting      Vital Signs Last 24 Hrs  T(C): 36.9 (06 Apr 2020 14:00), Max: 37 (05 Apr 2020 20:39)  T(F): 98.5 (06 Apr 2020 14:00), Max: 98.6 (05 Apr 2020 20:39)  HR: 93 (06 Apr 2020 14:00) (90 - 99)  BP: 119/66 (06 Apr 2020 14:00) (117/68 - 122/65)  BP(mean): --  RR: 17 (06 Apr 2020 14:00) (17 - 20)  SpO2: 95% (06 Apr 2020 14:00) (95% - 97%)  CAPILLARY BLOOD GLUCOSE      POCT Blood Glucose.: 183 mg/dL (06 Apr 2020 12:01)  POCT Blood Glucose.: 132 mg/dL (06 Apr 2020 08:04)  POCT Blood Glucose.: 184 mg/dL (05 Apr 2020 21:09)    I&O's Summary      PHYSICAL EXAM:  GENERAL: NAD, well-developed  CHEST/LUNG: Breathing comfortably, no accessory muscle use  HEART: no tachycardia  ABDOMEN: Soft, Nontender, Nondistended  EXTREMITIES: no LE edema  PSYCH: CalmAAOx3  NEUROLOGY: AA answers questions appropriately      LABS:                        12.5   9.17  )-----------( 658      ( 06 Apr 2020 05:56 )             39.5     04-06    139  |  97<L>  |  12  ----------------------------<  116<H>  4.9   |  28  |  0.69    Ca    10.1      06 Apr 2020 05:56  Phos  4.3     04-05  Mg     2.2     04-05    TPro  8.1  /  Alb  3.5  /  TBili  0.6  /  DBili  x   /  AST  56<H>  /  ALT  59<H>  /  AlkPhos  164<H>  04-06              RADIOLOGY & ADDITIONAL TESTS:    Imaging Personally Reviewed:    Consultant(s) Notes Reviewed:      Care Discussed with Consultants/Other Providers:

## 2020-04-06 NOTE — PROGRESS NOTE ADULT - PROBLEM SELECTOR PLAN 2
- Continue with droplet and contact precautions  - Continue with plaquenil  - Continue with supportive care - Continue with droplet and contact precautions  - Completed plaquenil  - Continue with supportive care

## 2020-04-06 NOTE — DIETITIAN INITIAL EVALUATION ADULT. - OTHER INFO
Py 53 yo female PMHx of HTN, DM, asthma, and HLD with respiratory failure secondary to COVID-19 infection - per chart review. Unable to conduct a face to face interview or nutrition-focused physical exam due to limited contact restrictions related to Pt's medical condition and isolation precautions. Collateral obtained from chart review. Unable to assess PO intake. No GI distress (nausea/vomiting/diarrhea/constipation) per chart. No chewing or swallowing difficulties at this time per chart. Of note Pt with oral thrush -> nystatin swish & spit. Unable to assess weight or diet history at present. Of note Pt's sic level 12.2% (3/31). Of note Pt's diet rx includes Consistent Carbohydrate restriction. Unable to provide diet education on prescribed diet at present. Will recommend to follow up with appropriate RDN upon discharge for the purposes of long-term nutrition evaluation and diet education. RDN remains available.

## 2020-04-06 NOTE — DIETITIAN INITIAL EVALUATION ADULT. - ADD RECOMMEND
1. Encourage & assist Pt with meals; Monitor PO diet tolerance; Honor food preferences;    2. Monitor labs, hydration status;    3. Suggest outpatient follow up with an endocrinologist to ensure long-term DM diet comprehension and compliance. Suggest outpatient follow up with appropriate RDN for the purposes of long-term nutrition evaluation and diet education;

## 2020-04-06 NOTE — PROGRESS NOTE ADULT - PROBLEM SELECTOR PLAN 1
- Secondary to COVID-19 infection  - Clinically improving  - Continue with supplemental O2 via nasal cannula wean as tolerated  - Incentive spirometry  - Discharge planning to home likely tomorrow

## 2020-04-06 NOTE — DIETITIAN INITIAL EVALUATION ADULT. - DIET TYPE
PO diet soft/DASH/TLC (sodium and cholesterol restricted diet)/No Dairy, No Lactose/consistent carbohydrate (no snacks)

## 2020-04-06 NOTE — CHART NOTE - NSCHARTNOTEFT_GEN_A_CORE
ELECTROPHYSIOLOGY      Patient with Biotel monitor.  Reports reviewed. Her heart rate is sinus rhythm in the 70-80's.    QTc 416ms - 432ms.   No atrial or ventricular tachycardia.  No pauses or evidence of heart block.

## 2020-04-06 NOTE — DIETITIAN INITIAL EVALUATION ADULT. - PERTINENT MEDS FT
Lovenox, Insulin (Humalog), Insulin (Lantus), Cepacol (PRN), Vit C, Zofran (PRN), Lipitor, Maalox (PRN), Thiamin,

## 2020-04-07 ENCOUNTER — TRANSCRIPTION ENCOUNTER (OUTPATIENT)
Age: 55
End: 2020-04-07

## 2020-04-07 VITALS
OXYGEN SATURATION: 93 % | TEMPERATURE: 98 F | RESPIRATION RATE: 24 BRPM | HEART RATE: 97 BPM | DIASTOLIC BLOOD PRESSURE: 74 MMHG | SYSTOLIC BLOOD PRESSURE: 114 MMHG

## 2020-04-07 PROCEDURE — 99239 HOSP IP/OBS DSCHRG MGMT >30: CPT

## 2020-04-07 RX ORDER — ALBUTEROL 90 UG/1
2 AEROSOL, METERED ORAL
Qty: 1 | Refills: 0
Start: 2020-04-07 | End: 2020-05-06

## 2020-04-07 RX ORDER — NYSTATIN 500MM UNIT
5 POWDER (EA) MISCELLANEOUS
Qty: 60 | Refills: 0
Start: 2020-04-07 | End: 2020-04-10

## 2020-04-07 RX ORDER — ACETAMINOPHEN 500 MG
2 TABLET ORAL
Qty: 80 | Refills: 0
Start: 2020-04-07 | End: 2020-04-16

## 2020-04-07 RX ORDER — ALBUTEROL 90 UG/1
2 AEROSOL, METERED ORAL
Qty: 0 | Refills: 0 | DISCHARGE

## 2020-04-07 RX ADMIN — Medication 3: at 13:35

## 2020-04-07 RX ADMIN — Medication 650 MILLIGRAM(S): at 05:06

## 2020-04-07 RX ADMIN — AMLODIPINE BESYLATE 10 MILLIGRAM(S): 2.5 TABLET ORAL at 05:06

## 2020-04-07 RX ADMIN — Medication 3 UNIT(S): at 13:35

## 2020-04-07 RX ADMIN — Medication 500 MILLIGRAM(S): at 05:06

## 2020-04-07 RX ADMIN — Medication 3 UNIT(S): at 09:23

## 2020-04-07 RX ADMIN — Medication 200 MILLIGRAM(S): at 12:47

## 2020-04-07 RX ADMIN — Medication 500000 UNIT(S): at 09:23

## 2020-04-07 NOTE — PROGRESS NOTE ADULT - SUBJECTIVE AND OBJECTIVE BOX
Patient is a 54y old  Female who presents with a chief complaint of sob/ cough/ fever (06 Apr 2020 17:46)    SUBJECTIVE / OVERNIGHT EVENTS: Pt seen and examined at 10:40m, no overnight events, denies any cough, sob or any other complaints, Sats ok on RA. No other new issues reported.      ROS  MEDICATIONS  (STANDING):  amLODIPine   Tablet 10 milliGRAM(s) Oral daily  ascorbic acid 500 milliGRAM(s) Oral two times a day  atorvastatin 40 milliGRAM(s) Oral at bedtime  dextrose 5%. 1000 milliLiter(s) (50 mL/Hr) IV Continuous <Continuous>  dextrose 50% Injectable 12.5 Gram(s) IV Push once  dextrose 50% Injectable 25 Gram(s) IV Push once  dextrose 50% Injectable 25 Gram(s) IV Push once  enoxaparin Injectable 40 milliGRAM(s) SubCutaneous daily  influenza   Vaccine 0.5 milliLiter(s) IntraMuscular once  insulin glargine Injectable (LANTUS) 60 Unit(s) SubCutaneous at bedtime  insulin lispro (HumaLOG) corrective regimen sliding scale   SubCutaneous at bedtime  insulin lispro (HumaLOG) corrective regimen sliding scale   SubCutaneous three times a day before meals  insulin lispro Injectable (HumaLOG) 3 Unit(s) SubCutaneous three times a day before meals  nystatin    Suspension [id number] Unit(s) Oral every 8 hours  thiamine 200 milliGRAM(s) Oral daily    MEDICATIONS  (PRN):  acetaminophen   Tablet .. 650 milliGRAM(s) Oral every 6 hours PRN Temp greater or equal to 38C (100.4F), Mild Pain (1 - 3), Moderate Pain (4 - 6)  ALBUTerol    90 MICROgram(s) HFA Inhaler 2 Puff(s) Inhalation every 6 hours PRN Shortness of Breath  aluminum hydroxide/magnesium hydroxide/simethicone Suspension 30 milliLiter(s) Oral every 8 hours PRN Dyspepsia  benzocaine 15 mG/menthol 3.6 mG (Sugar-Free) Lozenge 1 Lozenge Oral four times a day PRN Sore Throat  dextrose 40% Gel 15 Gram(s) Oral once PRN Blood Glucose LESS THAN 70 milliGRAM(s)/deciliter  glucagon  Injectable 1 milliGRAM(s) IntraMuscular once PRN Glucose LESS THAN 70 milligrams/deciliter  guaiFENesin   Syrup  (Sugar-Free) 100 milliGRAM(s) Oral every 6 hours PRN Cough  hydrOXYzine hydrochloride Injectable 25 milliGRAM(s) IntraMuscular once PRN Anxiety  ondansetron Injectable 4 milliGRAM(s) IV Push every 6 hours PRN Nausea and/or Vomiting      Vital Signs Last 24 Hrs  T(C): 36.5 (07 Apr 2020 05:05), Max: 36.8 (06 Apr 2020 21:20)  T(F): 97.7 (07 Apr 2020 05:05), Max: 98.3 (06 Apr 2020 21:20)  HR: 91 (07 Apr 2020 05:05) (91 - 95)  BP: 124/81 (07 Apr 2020 05:05) (119/65 - 124/81)  BP(mean): --  RR: 18 (07 Apr 2020 05:05) (18 - 18)  SpO2: 93% (07 Apr 2020 09:47) (93% - 95%)  CAPILLARY BLOOD GLUCOSE      POCT Blood Glucose.: 247 mg/dL (07 Apr 2020 13:33)  POCT Blood Glucose.: 179 mg/dL (07 Apr 2020 12:03)  POCT Blood Glucose.: 107 mg/dL (07 Apr 2020 08:15)  POCT Blood Glucose.: 234 mg/dL (06 Apr 2020 22:08)  POCT Blood Glucose.: 132 mg/dL (06 Apr 2020 17:34)    I&O's Summary      PHYSICAL EXAM:  GENERAL: NAD, well-developed  CHEST/LUNG: Breathing comfortably, no accessory muscle use  HEART: no tachycardia  ABDOMEN: Soft, Nontender, Nondistended  EXTREMITIES: no LE edema  PSYCH: Calm  NEUROLOGY: AA answers questions appropriately      LABS:                        12.5   9.17  )-----------( 658      ( 06 Apr 2020 05:56 )             39.5     04-06    139  |  97<L>  |  12  ----------------------------<  116<H>  4.9   |  28  |  0.69    Ca    10.1      06 Apr 2020 05:56    TPro  8.1  /  Alb  3.5  /  TBili  0.6  /  DBili  x   /  AST  56<H>  /  ALT  59<H>  /  AlkPhos  164<H>  04-06                      Ferritin, Serum: 1406 ng/mL (04-04-20 @ 05:17)  Ferritin, Serum: 1451 ng/mL (04-03-20 @ 05:00)      RADIOLOGY & ADDITIONAL TESTS:    Imaging Personally Reviewed:    Care Discussed with Consultants/Other Providers:

## 2020-04-07 NOTE — PROGRESS NOTE ADULT - PROBLEM SELECTOR PLAN 2
- Continue with droplet and contact precautions  - Completed plaquenil  - Continue with supportive care

## 2020-04-07 NOTE — PROGRESS NOTE ADULT - PROBLEM SELECTOR PLAN 1
- Secondary to COVID-19 infection  - improving  - Sats ok on RA, will d/c home today, d/c planning time spent in coordination 40 mts ( preparing d/c summary and plan)  - Left message for family

## 2020-04-07 NOTE — PROGRESS NOTE ADULT - PROBLEM SELECTOR PLAN 9
- Continue with nystatin swish and spit
- Start on lovenox 40 mg s/c daily
- Start on nystatin swish and spit
- Start on lovenox 40 mg s/c daily

## 2020-04-07 NOTE — DISCHARGE NOTE NURSING/CASE MANAGEMENT/SOCIAL WORK - NSDCFUADDAPPT_GEN_ALL_CORE_FT
Please follow up with your primary care provider within 1 week of discharge from the hospital. If you do not have a primary care provider please follow up with the [facility], call [phone]

## 2020-04-07 NOTE — DISCHARGE NOTE NURSING/CASE MANAGEMENT/SOCIAL WORK - PATIENT PORTAL LINK FT
You can access the FollowMyHealth Patient Portal offered by [facility] by registering at the following website: [email or web address]. By joining [employer]’s FollowMyHealth portal, you will also be able to view your health information using other applications (apps) compatible with our system.

## 2020-04-07 NOTE — PROGRESS NOTE ADULT - REASON FOR ADMISSION
sob/ cough/ fever

## 2020-04-07 NOTE — PROGRESS NOTE ADULT - PROBLEM SELECTOR PROBLEM 9
Oral thrush
Prophylactic measure
Prophylactic measure

## 2021-01-26 NOTE — PROGRESS NOTE ADULT - PROBLEM SELECTOR PROBLEM 5
Asthma, unspecified asthma severity, unspecified whether complicated, unspecified whether persistent Bill For Surgical Tray: no Expected Date Of Service: 01/26/2021 Billing Type: Third-Party Bill

## 2021-02-11 NOTE — H&P ADULT - NSHPPOAURINARYCATHETER_GEN_ALL_CORE
Pt presented for covid testing d/t positive exposure a week ago. 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     Jaydon MAR RN   [facility] [place]    
no

## 2021-05-21 NOTE — PATIENT PROFILE ADULT - VISION (WITH CORRECTIVE LENSES IF THE PATIENT USUALLY WEARS THEM):
Normal vision: sees adequately in most situations; can see medication labels, newsprint Detail Level: Detailed X Size Of Lesion In Cm (Optional): 0 Body Location Override (Optional - Billing Will Still Be Based On Selected Body Map Location If Applicable): mid back

## 2022-01-12 ENCOUNTER — TRANSCRIPTION ENCOUNTER (OUTPATIENT)
Age: 57
End: 2022-01-12

## 2022-05-17 ENCOUNTER — NON-APPOINTMENT (OUTPATIENT)
Age: 57
End: 2022-05-17

## 2022-06-16 ENCOUNTER — NON-APPOINTMENT (OUTPATIENT)
Age: 57
End: 2022-06-16

## 2022-10-07 NOTE — H&P ADULT - DOES THIS PATIENT HAVE A HISTORY OF OR HAS BEEN DX WITH HEART FAILURE?
RT Nebulizer Bronchodilator Protocol Note    There is a bronchodilator order in the chart from a provider indicating to follow the RT Bronchodilator Protocol and there is an Initiate RT Bronchodilator Protocol order as well (see protocol at bottom of note). CXR Findings:  XR CHEST PORTABLE    Result Date: 10/5/2022  Impression: No acute processes. This document has been electronically signed by: Mandi Romero MD on 10/05/2022 10:50 PM      The findings from the last RT Protocol Assessment were as follows:  Smoking: Chronic pulmonary disease  Respiratory Pattern: Dyspnea on exertion or RR 21-25 bpm  Breath Sounds: Slightly diminished and/or crackles  Cough: Strong, spontaneous, non-productive  Indication for Bronchodilator Therapy: Decreased or absent breath sounds  Bronchodilator Assessment Score: 6    Aerosolized bronchodilator medication orders have been revised according to the RT Nebulizer Bronchodilator Protocol below. Respiratory Therapist to perform RT Therapy Protocol Assessment initially then follow the protocol. Repeat RT Therapy Protocol Assessment PRN for score 0-3 or on second treatment, BID, and PRN for scores above 3. No Indications - adjust the frequency to every 6 hours PRN wheezing or bronchospasm, if no treatments needed after 48 hours then discontinue using Per Protocol order mode. If indication present, adjust the RT bronchodilator orders based on the Bronchodilator Assessment Score as indicated below. If a patient is on this medication at home then do not decrease Frequency below that used at home. 0-3 - enter or revise RT bronchodilator order(s) to equivalent RT Bronchodilator order with Frequency of every 4 hours PRN for wheezing or increased work of breathing using Per Protocol order mode.        4-6 - enter or revise RT Bronchodilator order(s) to two equivalent RT bronchodilator orders with one order with BID Frequency and one order with Frequency of every 4 hours PRN wheezing or increased work of breathing using Per Protocol order mode. 7-10 - enter or revise RT Bronchodilator order(s) to two equivalent RT bronchodilator orders with one order with TID Frequency and one order with Frequency of every 4 hours PRN wheezing or increased work of breathing using Per Protocol order mode. 11-13 - enter or revise RT Bronchodilator order(s) to one equivalent RT bronchodilator order with QID Frequency and an Albuterol order with Frequency of every 4 hours PRN wheezing or increased work of breathing using Per Protocol order mode. Greater than 13 - enter or revise RT Bronchodilator order(s) to one equivalent RT bronchodilator order with every 4 hours Frequency and an Albuterol order with Frequency of every 2 hours PRN wheezing or increased work of breathing using Per Protocol order mode. RT to enter RT Home Evaluation for COPD & MDI Assessment order using Per Protocol order mode.     Electronically signed by Kirit Judd RCP on 10/7/2022 at 7:21 PM no

## 2023-01-30 ENCOUNTER — NON-APPOINTMENT (OUTPATIENT)
Age: 58
End: 2023-01-30

## 2023-06-06 ENCOUNTER — NON-APPOINTMENT (OUTPATIENT)
Age: 58
End: 2023-06-06

## 2023-08-16 ENCOUNTER — APPOINTMENT (OUTPATIENT)
Dept: ORTHOPEDIC SURGERY | Facility: CLINIC | Age: 58
End: 2023-08-16
Payer: COMMERCIAL

## 2023-08-16 VITALS — WEIGHT: 173 LBS | HEIGHT: 63 IN | BODY MASS INDEX: 30.65 KG/M2

## 2023-08-16 DIAGNOSIS — Z78.9 OTHER SPECIFIED HEALTH STATUS: ICD-10-CM

## 2023-08-16 DIAGNOSIS — M70.61 TROCHANTERIC BURSITIS, RIGHT HIP: ICD-10-CM

## 2023-08-16 DIAGNOSIS — R01.1 CARDIAC MURMUR, UNSPECIFIED: ICD-10-CM

## 2023-08-16 DIAGNOSIS — M46.1 SACROILIITIS, NOT ELSEWHERE CLASSIFIED: ICD-10-CM

## 2023-08-16 DIAGNOSIS — M47.816 SPONDYLOSIS W/OUT MYELOPATHY OR RADICULOPATHY, LUMBAR REGION: ICD-10-CM

## 2023-08-16 DIAGNOSIS — I10 ESSENTIAL (PRIMARY) HYPERTENSION: ICD-10-CM

## 2023-08-16 DIAGNOSIS — E78.00 PURE HYPERCHOLESTEROLEMIA, UNSPECIFIED: ICD-10-CM

## 2023-08-16 DIAGNOSIS — E11.9 TYPE 2 DIABETES MELLITUS W/OUT COMPLICATIONS: ICD-10-CM

## 2023-08-16 PROCEDURE — 99204 OFFICE O/P NEW MOD 45 MIN: CPT

## 2023-08-16 PROCEDURE — 72170 X-RAY EXAM OF PELVIS: CPT

## 2023-08-16 PROCEDURE — 72110 X-RAY EXAM L-2 SPINE 4/>VWS: CPT

## 2023-08-16 RX ORDER — GABAPENTIN 100 MG/1
100 CAPSULE ORAL
Qty: 60 | Refills: 1 | Status: ACTIVE | COMMUNITY
Start: 2023-08-16 | End: 1900-01-01

## 2023-08-16 RX ORDER — GLIMEPIRIDE 4 MG/1
TABLET ORAL
Refills: 0 | Status: ACTIVE | COMMUNITY

## 2023-08-16 RX ORDER — ROSUVASTATIN CALCIUM 5 MG/1
TABLET, FILM COATED ORAL
Refills: 0 | Status: ACTIVE | COMMUNITY

## 2023-08-16 RX ORDER — INSULIN GLARGINE 100 [IU]/ML
INJECTION, SOLUTION SUBCUTANEOUS
Refills: 0 | Status: ACTIVE | COMMUNITY

## 2023-08-16 RX ORDER — AMLODIPINE BESYLATE 5 MG/1
TABLET ORAL
Refills: 0 | Status: ACTIVE | COMMUNITY

## 2023-08-16 RX ORDER — RAMIPRIL 5 MG/1
CAPSULE ORAL
Refills: 0 | Status: ACTIVE | COMMUNITY

## 2023-08-16 NOTE — ASSESSMENT
[FreeTextEntry1] : DDD with RLE radic.  R GT bursitis PT, c/w meds. Add laurel  avoid MDP given A1C. Discussed general treatment limitations with uncontrolled DM  Gabapentin- Patient advised of sedating effects, instructed not to drive, operate machinery, or take with other sedating medications. Advised of need to taper on/off medication and risk of abruptly stopping gabapentin. f/u 6 weeks Will discuss MRI  Patient seen by Shakira Howard PA-C, with and under the supervision of  Dr. Jan Deutsch M.D.

## 2023-08-16 NOTE — IMAGING
[Straightening consistent with spasm] : Straightening consistent with spasm [Facet arthropathy] : Facet arthropathy [Disc space narrowing] : Disc space narrowing [AP] : anteroposterior [Mild arthritis (Tonnis Grade 1)] : Mild arthritis (Tonnis Grade 1) [de-identified] : LSPINE Inspection: No rash or ecchymosis Palpation: midline lumbar tenderness, R SIJ tenderness, bilateral lumbar paraspinal tenderness ROM: diminished all planes Strength: nonfocal BLEs. Testing limited due to pain/effort Sensation: Sensation present to light touch bilateral L2-S1 distributions Provocative maneuvers: + R straight leg raise, - L straight leg raise  R hip Palpation: Tenderness to palpation over R GT
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
## 2023-09-29 ENCOUNTER — APPOINTMENT (OUTPATIENT)
Dept: ORTHOPEDIC SURGERY | Facility: CLINIC | Age: 58
End: 2023-09-29

## 2023-10-15 ENCOUNTER — EMERGENCY (EMERGENCY)
Facility: HOSPITAL | Age: 58
LOS: 1 days | Discharge: HOME | End: 2023-10-15
Attending: STUDENT IN AN ORGANIZED HEALTH CARE EDUCATION/TRAINING PROGRAM | Admitting: STUDENT IN AN ORGANIZED HEALTH CARE EDUCATION/TRAINING PROGRAM
Payer: COMMERCIAL

## 2023-10-15 VITALS
DIASTOLIC BLOOD PRESSURE: 126 MMHG | HEART RATE: 73 BPM | OXYGEN SATURATION: 100 % | RESPIRATION RATE: 18 BRPM | TEMPERATURE: 99 F | SYSTOLIC BLOOD PRESSURE: 162 MMHG

## 2023-10-15 DIAGNOSIS — Z98.890 OTHER SPECIFIED POSTPROCEDURAL STATES: Chronic | ICD-10-CM

## 2023-10-15 DIAGNOSIS — Z87.2 PERSONAL HISTORY OF DISEASES OF THE SKIN AND SUBCUTANEOUS TISSUE: Chronic | ICD-10-CM

## 2023-10-15 DIAGNOSIS — Z98.51 TUBAL LIGATION STATUS: Chronic | ICD-10-CM

## 2023-10-15 LAB
ALBUMIN SERPL ELPH-MCNC: 4.6 G/DL — SIGNIFICANT CHANGE UP (ref 3.3–5)
ALP SERPL-CCNC: 90 U/L — SIGNIFICANT CHANGE UP (ref 40–120)
ALT FLD-CCNC: 31 U/L — SIGNIFICANT CHANGE UP (ref 4–33)
ANION GAP SERPL CALC-SCNC: 13 MMOL/L — SIGNIFICANT CHANGE UP (ref 7–14)
APPEARANCE UR: CLEAR — SIGNIFICANT CHANGE UP
AST SERPL-CCNC: 21 U/L — SIGNIFICANT CHANGE UP (ref 4–32)
BACTERIA # UR AUTO: ABNORMAL /HPF
BASE EXCESS BLDV CALC-SCNC: 4.9 MMOL/L — HIGH (ref -2–3)
BASOPHILS # BLD AUTO: 0.02 K/UL — SIGNIFICANT CHANGE UP (ref 0–0.2)
BASOPHILS NFR BLD AUTO: 0.4 % — SIGNIFICANT CHANGE UP (ref 0–2)
BILIRUB SERPL-MCNC: 0.4 MG/DL — SIGNIFICANT CHANGE UP (ref 0.2–1.2)
BILIRUB UR-MCNC: NEGATIVE — SIGNIFICANT CHANGE UP
BLOOD GAS VENOUS COMPREHENSIVE RESULT: SIGNIFICANT CHANGE UP
BUN SERPL-MCNC: 8 MG/DL — SIGNIFICANT CHANGE UP (ref 7–23)
CALCIUM SERPL-MCNC: 9.7 MG/DL — SIGNIFICANT CHANGE UP (ref 8.4–10.5)
CHLORIDE BLDV-SCNC: 98 MMOL/L — SIGNIFICANT CHANGE UP (ref 96–108)
CHLORIDE SERPL-SCNC: 100 MMOL/L — SIGNIFICANT CHANGE UP (ref 98–107)
CO2 BLDV-SCNC: 33.9 MMOL/L — HIGH (ref 22–26)
CO2 SERPL-SCNC: 27 MMOL/L — SIGNIFICANT CHANGE UP (ref 22–31)
COLOR SPEC: YELLOW — SIGNIFICANT CHANGE UP
CREAT SERPL-MCNC: 0.61 MG/DL — SIGNIFICANT CHANGE UP (ref 0.5–1.3)
DIFF PNL FLD: ABNORMAL
EGFR: 104 ML/MIN/1.73M2 — SIGNIFICANT CHANGE UP
EOSINOPHIL # BLD AUTO: 0.08 K/UL — SIGNIFICANT CHANGE UP (ref 0–0.5)
EOSINOPHIL NFR BLD AUTO: 1.6 % — SIGNIFICANT CHANGE UP (ref 0–6)
EPI CELLS # UR: PRESENT
FLUAV AG NPH QL: SIGNIFICANT CHANGE UP
FLUBV AG NPH QL: SIGNIFICANT CHANGE UP
GAS PNL BLDV: 135 MMOL/L — LOW (ref 136–145)
GLUCOSE BLDV-MCNC: 358 MG/DL — HIGH (ref 70–99)
GLUCOSE SERPL-MCNC: 329 MG/DL — HIGH (ref 70–99)
GLUCOSE UR QL: >=1000 MG/DL
HCO3 BLDV-SCNC: 32 MMOL/L — HIGH (ref 22–29)
HCT VFR BLD CALC: 41.6 % — SIGNIFICANT CHANGE UP (ref 34.5–45)
HCT VFR BLDA CALC: 45 % — SIGNIFICANT CHANGE UP (ref 34.5–46.5)
HGB BLD CALC-MCNC: 15.1 G/DL — SIGNIFICANT CHANGE UP (ref 11.7–16.1)
HGB BLD-MCNC: 13.5 G/DL — SIGNIFICANT CHANGE UP (ref 11.5–15.5)
IANC: 1.73 K/UL — LOW (ref 1.8–7.4)
IMM GRANULOCYTES NFR BLD AUTO: 0.2 % — SIGNIFICANT CHANGE UP (ref 0–0.9)
KETONES UR-MCNC: 15 MG/DL
LACTATE BLDV-MCNC: 1.9 MMOL/L — SIGNIFICANT CHANGE UP (ref 0.5–2)
LEUKOCYTE ESTERASE UR-ACNC: NEGATIVE — SIGNIFICANT CHANGE UP
LIDOCAIN IGE QN: 31 U/L — SIGNIFICANT CHANGE UP (ref 7–60)
LYMPHOCYTES # BLD AUTO: 2.89 K/UL — SIGNIFICANT CHANGE UP (ref 1–3.3)
LYMPHOCYTES # BLD AUTO: 58.1 % — HIGH (ref 13–44)
MCHC RBC-ENTMCNC: 27.7 PG — SIGNIFICANT CHANGE UP (ref 27–34)
MCHC RBC-ENTMCNC: 32.5 GM/DL — SIGNIFICANT CHANGE UP (ref 32–36)
MCV RBC AUTO: 85.2 FL — SIGNIFICANT CHANGE UP (ref 80–100)
MONOCYTES # BLD AUTO: 0.24 K/UL — SIGNIFICANT CHANGE UP (ref 0–0.9)
MONOCYTES NFR BLD AUTO: 4.8 % — SIGNIFICANT CHANGE UP (ref 2–14)
NEUTROPHILS # BLD AUTO: 1.73 K/UL — LOW (ref 1.8–7.4)
NEUTROPHILS NFR BLD AUTO: 34.9 % — LOW (ref 43–77)
NITRITE UR-MCNC: NEGATIVE — SIGNIFICANT CHANGE UP
NRBC # BLD: 0 /100 WBCS — SIGNIFICANT CHANGE UP (ref 0–0)
NRBC # FLD: 0 K/UL — SIGNIFICANT CHANGE UP (ref 0–0)
PCO2 BLDV: 57 MMHG — HIGH (ref 39–52)
PH BLDV: 7.36 — SIGNIFICANT CHANGE UP (ref 7.32–7.43)
PH UR: 5.5 — SIGNIFICANT CHANGE UP (ref 5–8)
PLATELET # BLD AUTO: 328 K/UL — SIGNIFICANT CHANGE UP (ref 150–400)
PO2 BLDV: 22 MMHG — LOW (ref 25–45)
POTASSIUM BLDV-SCNC: 3.6 MMOL/L — SIGNIFICANT CHANGE UP (ref 3.5–5.1)
POTASSIUM SERPL-MCNC: 3.8 MMOL/L — SIGNIFICANT CHANGE UP (ref 3.5–5.3)
POTASSIUM SERPL-SCNC: 3.8 MMOL/L — SIGNIFICANT CHANGE UP (ref 3.5–5.3)
PROT SERPL-MCNC: 7.4 G/DL — SIGNIFICANT CHANGE UP (ref 6–8.3)
PROT UR-MCNC: NEGATIVE MG/DL — SIGNIFICANT CHANGE UP
RBC # BLD: 4.88 M/UL — SIGNIFICANT CHANGE UP (ref 3.8–5.2)
RBC # FLD: 13.1 % — SIGNIFICANT CHANGE UP (ref 10.3–14.5)
RBC CASTS # UR COMP ASSIST: 3 /HPF — SIGNIFICANT CHANGE UP (ref 0–4)
RSV RNA NPH QL NAA+NON-PROBE: SIGNIFICANT CHANGE UP
SAO2 % BLDV: 45.8 % — LOW (ref 67–88)
SARS-COV-2 RNA SPEC QL NAA+PROBE: SIGNIFICANT CHANGE UP
SODIUM SERPL-SCNC: 140 MMOL/L — SIGNIFICANT CHANGE UP (ref 135–145)
SP GR SPEC: 1.04 — HIGH (ref 1–1.03)
TROPONIN T, HIGH SENSITIVITY RESULT: 12 NG/L — SIGNIFICANT CHANGE UP
UROBILINOGEN FLD QL: 0.2 MG/DL — SIGNIFICANT CHANGE UP (ref 0.2–1)
WBC # BLD: 4.97 K/UL — SIGNIFICANT CHANGE UP (ref 3.8–10.5)
WBC # FLD AUTO: 4.97 K/UL — SIGNIFICANT CHANGE UP (ref 3.8–10.5)
WBC UR QL: 4 /HPF — SIGNIFICANT CHANGE UP (ref 0–5)

## 2023-10-15 PROCEDURE — 93010 ELECTROCARDIOGRAM REPORT: CPT

## 2023-10-15 PROCEDURE — 71046 X-RAY EXAM CHEST 2 VIEWS: CPT | Mod: 26

## 2023-10-15 PROCEDURE — 99285 EMERGENCY DEPT VISIT HI MDM: CPT

## 2023-10-15 RX ORDER — ACETAMINOPHEN 500 MG
1000 TABLET ORAL ONCE
Refills: 0 | Status: COMPLETED | OUTPATIENT
Start: 2023-10-15 | End: 2023-10-15

## 2023-10-15 RX ORDER — SODIUM CHLORIDE 9 MG/ML
1000 INJECTION INTRAMUSCULAR; INTRAVENOUS; SUBCUTANEOUS ONCE
Refills: 0 | Status: COMPLETED | OUTPATIENT
Start: 2023-10-15 | End: 2023-10-15

## 2023-10-15 RX ORDER — ALBUTEROL 90 UG/1
1 AEROSOL, METERED ORAL ONCE
Refills: 0 | Status: COMPLETED | OUTPATIENT
Start: 2023-10-15 | End: 2023-10-15

## 2023-10-15 RX ADMIN — SODIUM CHLORIDE 1000 MILLILITER(S): 9 INJECTION INTRAMUSCULAR; INTRAVENOUS; SUBCUTANEOUS at 20:59

## 2023-10-15 RX ADMIN — ALBUTEROL 1 PUFF(S): 90 AEROSOL, METERED ORAL at 20:03

## 2023-10-15 RX ADMIN — Medication 400 MILLIGRAM(S): at 20:59

## 2023-10-15 NOTE — ED PROVIDER NOTE - CLINICAL SUMMARY MEDICAL DECISION MAKING FREE TEXT BOX
59 yo F presenst with CP, shortness of breath, abdominal pain, and increased urinary frequency. Assess if symptoms due to viral illness (likely given exposure and malaise) vs ACS (unlikely given quality of CP) vs abdominal pathology (appendicitis vs UTI) 57 yo F presenst with CP, shortness of breath, abdominal pain, and increased urinary frequency. Assess if symptoms due to viral illness (likely given exposure and malaise) vs ACS (unlikely given quality of CP) vs abdominal pathology (appendicitis vs UTI). CT abdomen pelvis, EKG, trops, UA, UC. Juliet Smith, DO PGY1

## 2023-10-15 NOTE — ED ADULT TRIAGE NOTE - CHIEF COMPLAINT QUOTE
c/o blurry vision, chest pain since last night, left arm pain w/tingling x1 week, SOB 3-4. Phx HTN, hyperlipidemia, asthma c/o blurry vision, chest pain since last night, left arm pain w/tingling x1 week, SOB 3-4 days. Phx HTN, hyperlipidemia, asthma, DM2. Noncompliant with meds. Patient well appearing.

## 2023-10-15 NOTE — ED ADULT NURSE NOTE - NSFALLUNIVINTERV_ED_ALL_ED
Bed/Stretcher in lowest position, wheels locked, appropriate side rails in place/Call bell, personal items and telephone in reach/Instruct patient to call for assistance before getting out of bed/chair/stretcher/Non-slip footwear applied when patient is off stretcher/[place] to call system/Physically safe environment - no spills, clutter or unnecessary equipment/Purposeful proactive rounding/Room/bathroom lighting operational, light cord in reach

## 2023-10-15 NOTE — ED PROVIDER NOTE - PHYSICAL EXAMINATION
Juliet Smith DO (PGY1)   Physical Exam:    Gen: NAD, AOx3, non-toxic appearing, able to ambulate without assistance  Lung: CTAB, no respiratory distress, no wheezes/rhonchi/rales B/L  CV: RRR, no murmurs, rubs or gallops  Abd: ttp in the RLQ, voluntary guarding, no rigidity, no rebound tenderness, no CVA tenderness   Skin: Warm, well perfused, no rash, no leg swelling

## 2023-10-15 NOTE — ED ADULT NURSE NOTE - CHIEF COMPLAINT QUOTE
c/o blurry vision, chest pain since last night, left arm pain w/tingling x1 week, SOB 3-4 days. Phx HTN, hyperlipidemia, asthma, DM2. Noncompliant with meds. Patient well appearing.

## 2023-10-15 NOTE — ED PROVIDER NOTE - PATIENT PORTAL LINK FT
You can access the FollowMyHealth Patient Portal offered by [facility] by registering at the following website: [email or web address]. By joining [employer]’s FollowMyHealth portal, you will also be able to view your health information using other applications (apps) compatible with our system.

## 2023-10-15 NOTE — ED PROVIDER NOTE - NSFOLLOWUPINSTRUCTIONS_ED_ALL_ED_FT
Today you were seen in the ED for abdominal pain.    It was found that you have no signs of a medical condition on today's exam.     Please follow up with your gastroenterologists / PCP for continued care and management.     [address] [place] [address]  [phone]    Please return to the ED if you have an increase in abdominal pain, nausea, vomiting or pain that is uncontrolled with your home pain relief.

## 2023-10-15 NOTE — ED PROVIDER NOTE - ATTENDING CONTRIBUTION TO CARE
I have personally performed a history and physical examination of the patient and discussed management with the resident as well as the patient.  I reviewed the resident's note and agree with the documented findings and plan of care.  I have authored and modified critical sections of the Provider Note, including but not limited to HPI, Physical Exam and MDM.    58-year-old female past medical history of hypertension, diabetes, asthma, and hyperlipidemia presents for 1 day of hypertension (noncompliant with medication for the last 3 months) associated with 1 day of midsternal, non-radiating chest pain. Patient also endorses increased urinary frequency and periumbilical\right lower quadrant abdominal pain.  UTI versus appendicitis versus diverticulitis versus low suspicion ectopic pregnancy/pregnancy versus low suspicion ACS.  Obtain CBC, CMP, VBG, TNI, UA, UC, CTAP, EKG.  Consider viral illness, obtain RVP.  Symptomatic control as needed.

## 2023-10-15 NOTE — ED PROVIDER NOTE - PROGRESS NOTE DETAILS
Joon PGY 3 Discussed lab and radiology findings with patient. Patient feels comfortable going home. Gave home care and follow up instructions. Discussed which symptoms to look out for and when to return to the ED for further evaluation. Patient given opportunity to ask questions about their medical condition and had all questions answered.   pt needed ativan 1mg for claustrophobe for the CT scan Patient asleep after receiving 1 mg IV ativan for claustrophobia to facilitate CT scan. No acute findings on work up. Patient will need reassessment in [facility]. Joon PGY 3 pt toelrated po

## 2023-10-15 NOTE — ED ADULT NURSE NOTE - OBJECTIVE STATEMENT
59 y/o female present in ED c/o chest tingling and an elevated BP. Patient further c/o minimal SOB but is able to speak in full sentences.

## 2023-10-15 NOTE — ED PROVIDER NOTE - OBJECTIVE STATEMENT
58-year-old female past medical history of hypertension, diabetes, asthma, and hyperlipidemia presents for 1 day of hypertension associated with 1 day of midsternal, non-radiating chest pain described as tightness that began while laying in bed, mild shortness of breath, and chills.  Patient also endorses increased urinary frequency and periumbilical\right lower quadrant abdominal pain few feet.  Denies nausea, vomiting, diarrhea, headache, dysuria, hematuria, back pain, or any other symptoms at this time.  PERC negative aside from age.  States she works at a  and is constantly around sick children. 58-year-old female past medical history of hypertension, diabetes, asthma, and hyperlipidemia presents for 1 day of hypertension associated with 1 day of midsternal, non-radiating chest pain described as tightness that began while laying in bed, mild shortness of breath, and chills.  Patient also endorses increased urinary frequency and periumbilical\right lower quadrant abdominal pain. States symptoms do not feel like her typical asthma and she has not used her rescue inhaler.     Denies nausea, vomiting, diarrhea, headache, dysuria, hematuria, back pain, or any other symptoms at this time.  PERC negative aside from age.  States she works at a  and is constantly around sick children.

## 2023-10-16 VITALS
HEART RATE: 74 BPM | SYSTOLIC BLOOD PRESSURE: 117 MMHG | TEMPERATURE: 99 F | DIASTOLIC BLOOD PRESSURE: 66 MMHG | OXYGEN SATURATION: 100 % | RESPIRATION RATE: 17 BRPM

## 2023-10-16 PROCEDURE — 74177 CT ABD & PELVIS W/CONTRAST: CPT | Mod: 26,MA

## 2023-10-16 RX ADMIN — Medication 1 MILLIGRAM(S): at 02:03

## 2023-10-16 NOTE — ED ADULT NURSE REASSESSMENT NOTE - NS ED NURSE REASSESS COMMENT FT1
Pt refused CT because of "feeling claustrophobic".  Provider notified.
Pt transported to CT.
Break RN: Pt sleeping in stretcher. Respirations even and unlabored, no accessory muscle use. Stretcher in lowest position, side rail up, call bell within reach.

## 2023-10-17 LAB
CULTURE RESULTS: SIGNIFICANT CHANGE UP
CULTURE RESULTS: SIGNIFICANT CHANGE UP
SPECIMEN SOURCE: SIGNIFICANT CHANGE UP
SPECIMEN SOURCE: SIGNIFICANT CHANGE UP

## 2023-12-01 ENCOUNTER — APPOINTMENT (OUTPATIENT)
Dept: ORTHOPEDIC SURGERY | Facility: CLINIC | Age: 58
End: 2023-12-01
Payer: COMMERCIAL

## 2023-12-01 DIAGNOSIS — M70.61 TROCHANTERIC BURSITIS, RIGHT HIP: ICD-10-CM

## 2023-12-01 DIAGNOSIS — M70.62 TROCHANTERIC BURSITIS, RIGHT HIP: ICD-10-CM

## 2023-12-01 DIAGNOSIS — M54.16 RADICULOPATHY, LUMBAR REGION: ICD-10-CM

## 2023-12-01 PROCEDURE — 99214 OFFICE O/P EST MOD 30 MIN: CPT

## 2023-12-01 RX ORDER — DICLOFENAC SODIUM 1% 10 MG/G
1 GEL TOPICAL DAILY
Qty: 1 | Refills: 3 | Status: ACTIVE | COMMUNITY
Start: 2023-12-01 | End: 1900-01-01

## 2024-01-01 ENCOUNTER — EMERGENCY (EMERGENCY)
Facility: HOSPITAL | Age: 59
LOS: 1 days | Discharge: HOME | End: 2024-01-01
Attending: EMERGENCY MEDICINE | Admitting: EMERGENCY MEDICINE
Payer: COMMERCIAL

## 2024-01-01 VITALS
DIASTOLIC BLOOD PRESSURE: 100 MMHG | HEART RATE: 94 BPM | TEMPERATURE: 98 F | RESPIRATION RATE: 16 BRPM | OXYGEN SATURATION: 98 % | SYSTOLIC BLOOD PRESSURE: 194 MMHG

## 2024-01-01 VITALS
OXYGEN SATURATION: 98 % | HEART RATE: 77 BPM | DIASTOLIC BLOOD PRESSURE: 88 MMHG | SYSTOLIC BLOOD PRESSURE: 164 MMHG | TEMPERATURE: 98 F | RESPIRATION RATE: 16 BRPM

## 2024-01-01 DIAGNOSIS — Z98.890 OTHER SPECIFIED POSTPROCEDURAL STATES: Chronic | ICD-10-CM

## 2024-01-01 DIAGNOSIS — Z98.51 TUBAL LIGATION STATUS: Chronic | ICD-10-CM

## 2024-01-01 DIAGNOSIS — Z87.2 PERSONAL HISTORY OF DISEASES OF THE SKIN AND SUBCUTANEOUS TISSUE: Chronic | ICD-10-CM

## 2024-01-01 PROCEDURE — 99284 EMERGENCY DEPT VISIT MOD MDM: CPT

## 2024-01-01 PROCEDURE — 93010 ELECTROCARDIOGRAM REPORT: CPT

## 2024-01-01 RX ORDER — SODIUM CHLORIDE 9 MG/ML
1000 INJECTION INTRAMUSCULAR; INTRAVENOUS; SUBCUTANEOUS ONCE
Refills: 0 | Status: COMPLETED | OUTPATIENT
Start: 2024-01-01 | End: 2024-01-01

## 2024-01-01 NOTE — ED PROVIDER NOTE - CLINICAL SUMMARY MEDICAL DECISION MAKING FREE TEXT BOX
57 yo F PMH Asthma, DM2, HLD, p/w increased head congestion and chest congestion x2 days. 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 Also report room spinning when she stands and feeling like her ears are clogged. Headache wrapped around her head. Denies prior covid test or vaccination.   Labs, Flu with COVID, Chest Xray  Dispo as per results 59 yo F PMH Asthma, DM2, HLD, p/w increased head congestion and chest congestion x2 days. 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 Also report room spinning when she stands and feeling like her ears are clogged. Headache wrapped around her head. Denies prior covid test or vaccination.   Labs, Flu with COVID, Chest Xray  Dispo as per results

## 2024-01-01 NOTE — ED PROVIDER NOTE - OBJECTIVE STATEMENT
57 yo F PMH Asthma, DM2, HLD, p/w increased head congestion and chest congestion x2 days. 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 Also report room spinning when she stands and feeling like her ears are clogged. Headache wrapped around her head. Denies prior covid test or vaccination. Denies fever, headache, dizziness, chills, chest pain, shortness of breath, abdominal pain, sick contact or recent travel. Denies alcohol use or other drugs. 59 yo F PMH Asthma, DM2, HLD, p/w increased head congestion and chest congestion x2 days. 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 Also report room spinning when she stands and feeling like her ears are clogged. Headache wrapped around her head. Denies prior covid test or vaccination. Denies fever, headache, dizziness, chills, chest pain, shortness of breath, abdominal pain, sick contact or recent travel. Denies alcohol use or other drugs.

## 2024-01-01 NOTE — ED PROVIDER NOTE - PATIENT PORTAL LINK FT
You can access the FollowMyHealth Patient Portal offered by [facility] by registering at the following website: [email or web address]. By joining [employer]’s FollowMyHealth portal, you will also be able to view your health information using other applications (apps) compatible with our system. You can access the FollowMyHealth Patient Portal offered by [facility] by registering at the following website: [email or web address]. By joining [employer]s FollowMyHealth portal, you will also be able to view your health information using other applications (apps) compatible with our system.

## 2024-01-01 NOTE — ED ADULT TRIAGE NOTE - CHIEF COMPLAINT QUOTE
Patient c/o chest tightness. Reports SOB and pain with inspiration. States she did not take her BP meds today due to pain with swallowing. PMH HTN, DM type2, asthma.

## 2024-01-02 LAB
ALBUMIN SERPL ELPH-MCNC: 4.1 G/DL — SIGNIFICANT CHANGE UP (ref 3.3–5)
ALBUMIN SERPL ELPH-MCNC: 4.1 G/DL — SIGNIFICANT CHANGE UP (ref 3.3–5)
ALP SERPL-CCNC: 110 U/L — SIGNIFICANT CHANGE UP (ref 40–120)
ALP SERPL-CCNC: 110 U/L — SIGNIFICANT CHANGE UP (ref 40–120)
ALT FLD-CCNC: 23 U/L — SIGNIFICANT CHANGE UP (ref 4–33)
ALT FLD-CCNC: 23 U/L — SIGNIFICANT CHANGE UP (ref 4–33)
ANION GAP SERPL CALC-SCNC: 13 MMOL/L — SIGNIFICANT CHANGE UP (ref 7–14)
ANION GAP SERPL CALC-SCNC: 13 MMOL/L — SIGNIFICANT CHANGE UP (ref 7–14)
AST SERPL-CCNC: 23 U/L — SIGNIFICANT CHANGE UP (ref 4–32)
AST SERPL-CCNC: 23 U/L — SIGNIFICANT CHANGE UP (ref 4–32)
BASOPHILS # BLD AUTO: 0.02 K/UL — SIGNIFICANT CHANGE UP (ref 0–0.2)
BASOPHILS # BLD AUTO: 0.02 K/UL — SIGNIFICANT CHANGE UP (ref 0–0.2)
BASOPHILS NFR BLD AUTO: 0.4 % — SIGNIFICANT CHANGE UP (ref 0–2)
BASOPHILS NFR BLD AUTO: 0.4 % — SIGNIFICANT CHANGE UP (ref 0–2)
BILIRUB SERPL-MCNC: 0.2 MG/DL — SIGNIFICANT CHANGE UP (ref 0.2–1.2)
BILIRUB SERPL-MCNC: 0.2 MG/DL — SIGNIFICANT CHANGE UP (ref 0.2–1.2)
BUN SERPL-MCNC: 13 MG/DL — SIGNIFICANT CHANGE UP (ref 7–23)
BUN SERPL-MCNC: 13 MG/DL — SIGNIFICANT CHANGE UP (ref 7–23)
CALCIUM SERPL-MCNC: 9.6 MG/DL — SIGNIFICANT CHANGE UP (ref 8.4–10.5)
CALCIUM SERPL-MCNC: 9.6 MG/DL — SIGNIFICANT CHANGE UP (ref 8.4–10.5)
CHLORIDE SERPL-SCNC: 98 MMOL/L — SIGNIFICANT CHANGE UP (ref 98–107)
CHLORIDE SERPL-SCNC: 98 MMOL/L — SIGNIFICANT CHANGE UP (ref 98–107)
CO2 SERPL-SCNC: 26 MMOL/L — SIGNIFICANT CHANGE UP (ref 22–31)
CO2 SERPL-SCNC: 26 MMOL/L — SIGNIFICANT CHANGE UP (ref 22–31)
CREAT SERPL-MCNC: 0.49 MG/DL — LOW (ref 0.5–1.3)
CREAT SERPL-MCNC: 0.49 MG/DL — LOW (ref 0.5–1.3)
EGFR: 109 ML/MIN/1.73M2 — SIGNIFICANT CHANGE UP
EGFR: 109 ML/MIN/1.73M2 — SIGNIFICANT CHANGE UP
EOSINOPHIL # BLD AUTO: 0.06 K/UL — SIGNIFICANT CHANGE UP (ref 0–0.5)
EOSINOPHIL # BLD AUTO: 0.06 K/UL — SIGNIFICANT CHANGE UP (ref 0–0.5)
EOSINOPHIL NFR BLD AUTO: 1.3 % — SIGNIFICANT CHANGE UP (ref 0–6)
EOSINOPHIL NFR BLD AUTO: 1.3 % — SIGNIFICANT CHANGE UP (ref 0–6)
FLUAV AG NPH QL: SIGNIFICANT CHANGE UP
FLUAV AG NPH QL: SIGNIFICANT CHANGE UP
FLUBV AG NPH QL: SIGNIFICANT CHANGE UP
FLUBV AG NPH QL: SIGNIFICANT CHANGE UP
GLUCOSE SERPL-MCNC: 379 MG/DL — HIGH (ref 70–99)
GLUCOSE SERPL-MCNC: 379 MG/DL — HIGH (ref 70–99)
HCT VFR BLD CALC: 39 % — SIGNIFICANT CHANGE UP (ref 34.5–45)
HCT VFR BLD CALC: 39 % — SIGNIFICANT CHANGE UP (ref 34.5–45)
HGB BLD-MCNC: 12.6 G/DL — SIGNIFICANT CHANGE UP (ref 11.5–15.5)
HGB BLD-MCNC: 12.6 G/DL — SIGNIFICANT CHANGE UP (ref 11.5–15.5)
IANC: 1.74 K/UL — LOW (ref 1.8–7.4)
IANC: 1.74 K/UL — LOW (ref 1.8–7.4)
IMM GRANULOCYTES NFR BLD AUTO: 0.2 % — SIGNIFICANT CHANGE UP (ref 0–0.9)
IMM GRANULOCYTES NFR BLD AUTO: 0.2 % — SIGNIFICANT CHANGE UP (ref 0–0.9)
LYMPHOCYTES # BLD AUTO: 2.57 K/UL — SIGNIFICANT CHANGE UP (ref 1–3.3)
LYMPHOCYTES # BLD AUTO: 2.57 K/UL — SIGNIFICANT CHANGE UP (ref 1–3.3)
LYMPHOCYTES # BLD AUTO: 53.9 % — HIGH (ref 13–44)
LYMPHOCYTES # BLD AUTO: 53.9 % — HIGH (ref 13–44)
MCHC RBC-ENTMCNC: 27.6 PG — SIGNIFICANT CHANGE UP (ref 27–34)
MCHC RBC-ENTMCNC: 27.6 PG — SIGNIFICANT CHANGE UP (ref 27–34)
MCHC RBC-ENTMCNC: 32.3 GM/DL — SIGNIFICANT CHANGE UP (ref 32–36)
MCHC RBC-ENTMCNC: 32.3 GM/DL — SIGNIFICANT CHANGE UP (ref 32–36)
MCV RBC AUTO: 85.5 FL — SIGNIFICANT CHANGE UP (ref 80–100)
MCV RBC AUTO: 85.5 FL — SIGNIFICANT CHANGE UP (ref 80–100)
MONOCYTES # BLD AUTO: 0.37 K/UL — SIGNIFICANT CHANGE UP (ref 0–0.9)
MONOCYTES # BLD AUTO: 0.37 K/UL — SIGNIFICANT CHANGE UP (ref 0–0.9)
MONOCYTES NFR BLD AUTO: 7.8 % — SIGNIFICANT CHANGE UP (ref 2–14)
MONOCYTES NFR BLD AUTO: 7.8 % — SIGNIFICANT CHANGE UP (ref 2–14)
NEUTROPHILS # BLD AUTO: 1.74 K/UL — LOW (ref 1.8–7.4)
NEUTROPHILS # BLD AUTO: 1.74 K/UL — LOW (ref 1.8–7.4)
NEUTROPHILS NFR BLD AUTO: 36.4 % — LOW (ref 43–77)
NEUTROPHILS NFR BLD AUTO: 36.4 % — LOW (ref 43–77)
NRBC # BLD: 0 /100 WBCS — SIGNIFICANT CHANGE UP (ref 0–0)
NRBC # BLD: 0 /100 WBCS — SIGNIFICANT CHANGE UP (ref 0–0)
NRBC # FLD: 0 K/UL — SIGNIFICANT CHANGE UP (ref 0–0)
NRBC # FLD: 0 K/UL — SIGNIFICANT CHANGE UP (ref 0–0)
PLATELET # BLD AUTO: 357 K/UL — SIGNIFICANT CHANGE UP (ref 150–400)
PLATELET # BLD AUTO: 357 K/UL — SIGNIFICANT CHANGE UP (ref 150–400)
POTASSIUM SERPL-MCNC: 4.3 MMOL/L — SIGNIFICANT CHANGE UP (ref 3.5–5.3)
POTASSIUM SERPL-MCNC: 4.3 MMOL/L — SIGNIFICANT CHANGE UP (ref 3.5–5.3)
POTASSIUM SERPL-SCNC: 4.3 MMOL/L — SIGNIFICANT CHANGE UP (ref 3.5–5.3)
POTASSIUM SERPL-SCNC: 4.3 MMOL/L — SIGNIFICANT CHANGE UP (ref 3.5–5.3)
PROT SERPL-MCNC: 7.5 G/DL — SIGNIFICANT CHANGE UP (ref 6–8.3)
PROT SERPL-MCNC: 7.5 G/DL — SIGNIFICANT CHANGE UP (ref 6–8.3)
RBC # BLD: 4.56 M/UL — SIGNIFICANT CHANGE UP (ref 3.8–5.2)
RBC # BLD: 4.56 M/UL — SIGNIFICANT CHANGE UP (ref 3.8–5.2)
RBC # FLD: 12.8 % — SIGNIFICANT CHANGE UP (ref 10.3–14.5)
RBC # FLD: 12.8 % — SIGNIFICANT CHANGE UP (ref 10.3–14.5)
RSV RNA NPH QL NAA+NON-PROBE: SIGNIFICANT CHANGE UP
RSV RNA NPH QL NAA+NON-PROBE: SIGNIFICANT CHANGE UP
SARS-COV-2 RNA SPEC QL NAA+PROBE: DETECTED
SARS-COV-2 RNA SPEC QL NAA+PROBE: DETECTED
SODIUM SERPL-SCNC: 137 MMOL/L — SIGNIFICANT CHANGE UP (ref 135–145)
SODIUM SERPL-SCNC: 137 MMOL/L — SIGNIFICANT CHANGE UP (ref 135–145)
WBC # BLD: 4.77 K/UL — SIGNIFICANT CHANGE UP (ref 3.8–10.5)
WBC # BLD: 4.77 K/UL — SIGNIFICANT CHANGE UP (ref 3.8–10.5)
WBC # FLD AUTO: 4.77 K/UL — SIGNIFICANT CHANGE UP (ref 3.8–10.5)
WBC # FLD AUTO: 4.77 K/UL — SIGNIFICANT CHANGE UP (ref 3.8–10.5)

## 2024-01-02 PROCEDURE — 71046 X-RAY EXAM CHEST 2 VIEWS: CPT | Mod: 26

## 2024-01-02 RX ORDER — ACETAMINOPHEN 500 MG
1000 TABLET ORAL ONCE
Refills: 0 | Status: COMPLETED | OUTPATIENT
Start: 2024-01-02 | End: 2024-01-02

## 2024-01-02 RX ADMIN — SODIUM CHLORIDE 1000 MILLILITER(S): 9 INJECTION INTRAMUSCULAR; INTRAVENOUS; SUBCUTANEOUS at 00:04

## 2024-01-02 RX ADMIN — Medication 400 MILLIGRAM(S): at 02:01

## 2024-01-02 NOTE — ED ADULT NURSE NOTE - NSFALLUNIVINTERV_ED_ALL_ED
Bed/Stretcher in lowest position, wheels locked, appropriate side rails in place/Call bell, personal items and telephone in reach/Instruct patient to call for assistance before getting out of bed/chair/stretcher/Non-slip footwear applied when patient is off stretcher/[place] to call system/Physically safe environment - no spills, clutter or unnecessary equipment/Purposeful proactive rounding/Room/bathroom lighting operational, light cord in reach Bed/Stretcher in lowest position, wheels locked, appropriate side rails in place/Call bell, personal items and telephone in reach/Instruct patient to call for assistance before getting out of bed/chair/stretcher/Non-slip footwear applied when patient is off stretcher/[place] to call system/Physically safe environment - no spills, clutter or unnecessary equipment/Purposeful proactive rounding/Room/bathroom lighting operational, light cord in reach

## 2024-01-02 NOTE — ED ADULT NURSE NOTE - OBJECTIVE STATEMENT
pt received in intake rm 6. pt is AAOX4 and ambulatory. pt c/o worsening cough and congestion for 2x days. 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 pt having difficulty swallowing due to sore throat. pt PMHx of HTN, DM, asthma. pt did not take BP meds today. pt c/o chest congestion and SOB. pt RR are even and unlabored. pt has 22g in R hand, labs drawn and sent, swab sent, IVF running.  at bedside. Pending XR. Ongoing eval in progress.

## 2024-01-03 NOTE — ED PROVIDER NOTE - MDM ORDERS SUBMITTED SELECTION
Labs/Imaging Studies Fluconazole Counseling:  Patient counseled regarding adverse effects of fluconazole including but not limited to headache, diarrhea, nausea, upset stomach, liver function test abnormalities, taste disturbance, and stomach pain.  There is a rare possibility of liver failure that can occur when taking fluconazole.  The patient understands that monitoring of LFTs and kidney function test may be required, especially at baseline. The patient verbalized understanding of the proper use and possible adverse effects of fluconazole.  All of the patient's questions and concerns were addressed. Bexarotene Counseling:  I discussed with the patient the risks of bexarotene including but not limited to hair loss, dry lips/skin/eyes, liver abnormalities, hyperlipidemia, pancreatitis, depression/suicidal ideation, photosensitivity, drug rash/allergic reactions, hypothyroidism, anemia, leukopenia, infection, cataracts, and teratogenicity.  Patient understands that they will need regular blood tests to check lipid profile, liver function tests, white blood cell count, thyroid function tests and pregnancy test if applicable. Stelara Pregnancy And Lactation Text: This medication is Pregnancy Category B and is considered safe during pregnancy. It is unknown if this medication is excreted in breast milk. Odomzo Counseling- I discussed with the patient the risks of Odomzo including but not limited to nausea, vomiting, diarrhea, constipation, weight loss, changes in the sense of taste, decreased appetite, muscle spasms, and hair loss.  The patient verbalized understanding of the proper use and possible adverse effects of Odomzo.  All of the patient's questions and concerns were addressed. Thalidomide Pregnancy And Lactation Text: This medication is Pregnancy Category X and is absolutely contraindicated during pregnancy. It is unknown if it is excreted in breast milk. Clofazimine Pregnancy And Lactation Text: This medication is Pregnancy Category C and isn't considered safe during pregnancy. It is excreted in breast milk. Imiquimod Pregnancy And Lactation Text: This medication is Pregnancy Category C. It is unknown if this medication is excreted in breast milk. Benzoyl Peroxide Pregnancy And Lactation Text: This medication is Pregnancy Category C. It is unknown if benzoyl peroxide is excreted in breast milk. Spironolactone Pregnancy And Lactation Text: This medication can cause feminization of the male fetus and should be avoided during pregnancy. The active metabolite is also found in breast milk. Ivermectin Counseling:  Patient instructed to take medication on an empty stomach with a full glass of water.  Patient informed of potential adverse effects including but not limited to nausea, diarrhea, dizziness, itching, and swelling of the extremities or lymph nodes.  The patient verbalized understanding of the proper use and possible adverse effects of ivermectin.  All of the patient's questions and concerns were addressed. Winlevi Pregnancy And Lactation Text: This medication is considered safe during pregnancy and breastfeeding. Rituxan Counseling:  I discussed with the patient the risks of Rituxan infusions. Side effects can include infusion reactions, severe drug rashes including mucocutaneous reactions, reactivation of latent hepatitis and other infections and rarely progressive multifocal leukoencephalopathy.  All of the patient's questions and concerns were addressed. Doxycycline Pregnancy And Lactation Text: This medication is Pregnancy Category D and not consider safe during pregnancy. It is also excreted in breast milk but is considered safe for shorter treatment courses. Methotrexate Counseling:  Patient counseled regarding adverse effects of methotrexate including but not limited to nausea, vomiting, abnormalities in liver function tests. Patients may develop mouth sores, rash, diarrhea, and abnormalities in blood counts. The patient understands that monitoring is required including LFT's and blood counts.  There is a rare possibility of scarring of the liver and lung problems that can occur when taking methotrexate. Persistent nausea, loss of appetite, pale stools, dark urine, cough, and shortness of breath should be reported immediately. Patient advised to discontinue methotrexate treatment at least three months before attempting to become pregnant.  I discussed the need for folate supplements while taking methotrexate.  These supplements can decrease side effects during methotrexate treatment. The patient verbalized understanding of the proper use and possible adverse effects of methotrexate.  All of the patient's questions and concerns were addressed. Oral Minoxidil Counseling- I discussed with the patient the risks of oral minoxidil including but not limited to shortness of breath, swelling of the feet or ankles, dizziness, lightheadedness, unwanted hair growth and allergic reaction.  The patient verbalized understanding of the proper use and possible adverse effects of oral minoxidil.  All of the patient's questions and concerns were addressed. Quinolones Counseling:  I discussed with the patient the risks of fluoroquinolones including but not limited to GI upset, allergic reaction, drug rash, diarrhea, dizziness, photosensitivity, yeast infections, liver function test abnormalities, tendonitis/tendon rupture. Topical Clindamycin Counseling: Patient counseled that this medication may cause skin irritation or allergic reactions.  In the event of skin irritation, the patient was advised to reduce the amount of the drug applied or use it less frequently.   The patient verbalized understanding of the proper use and possible adverse effects of clindamycin.  All of the patient's questions and concerns were addressed. Rinvoq Pregnancy And Lactation Text: Based on animal studies, Rinvoq may cause embryo-fetal harm when administered to pregnant women.  The medication should not be used in pregnancy.  Breastfeeding is not recommended during treatment and for 6 days after the last dose. Hydroxychloroquine Pregnancy And Lactation Text: This medication has been shown to cause fetal harm but it isn't assigned a Pregnancy Risk Category. There are small amounts excreted in breast milk. Dupixent Pregnancy And Lactation Text: This medication likely crosses the placenta but the risk for the fetus is uncertain. This medication is excreted in breast milk. Azithromycin Counseling:  I discussed with the patient the risks of azithromycin including but not limited to GI upset, allergic reaction, drug rash, diarrhea, and yeast infections. Colchicine Counseling:  Patient counseled regarding adverse effects including but not limited to stomach upset (nausea, vomiting, stomach pain, or diarrhea).  Patient instructed to limit alcohol consumption while taking this medication.  Colchicine may reduce blood counts especially with prolonged use.  The patient understands that monitoring of kidney function and blood counts may be required, especially at baseline. The patient verbalized understanding of the proper use and possible adverse effects of colchicine.  All of the patient's questions and concerns were addressed. Bexarotene Pregnancy And Lactation Text: This medication is Pregnancy Category X and should not be given to women who are pregnant or may become pregnant. This medication should not be used if you are breast feeding. Tranexamic Acid Counseling:  Patient advised of the small risk of bleeding problems with tranexamic acid. They were also instructed to call if they developed any nausea, vomiting or diarrhea. All of the patient's questions and concerns were addressed. Cimetidine Counseling:  I discussed with the patient the risks of Cimetidine including but not limited to gynecomastia, headache, diarrhea, nausea, drowsiness, arrhythmias, pancreatitis, skin rashes, psychosis, bone marrow suppression and kidney toxicity. Taltz Counseling: I discussed with the patient the risks of ixekizumab including but not limited to immunosuppression, serious infections, worsening of inflammatory bowel disease and drug reactions.  The patient understands that monitoring is required including a PPD at baseline and must alert us or the primary physician if symptoms of infection or other concerning signs are noted. Fluconazole Pregnancy And Lactation Text: This medication is Pregnancy Category C and it isn't know if it is safe during pregnancy. It is also excreted in breast milk. Protopic Pregnancy And Lactation Text: This medication is Pregnancy Category C. It is unknown if this medication is excreted in breast milk when applied topically. Otezla Pregnancy And Lactation Text: This medication is Pregnancy Category C and it isn't known if it is safe during pregnancy. It is unknown if it is excreted in breast milk. Minoxidil Counseling: Minoxidil is a topical medication which can increase blood flow where it is applied. It is uncertain how this medication increases hair growth. Side effects are uncommon and include stinging and allergic reactions. Rituxan Pregnancy And Lactation Text: This medication is Pregnancy Category C and it isn't know if it is safe during pregnancy. It is unknown if this medication is excreted in breast milk but similar antibodies are known to be excreted. Ivermectin Pregnancy And Lactation Text: This medication is Pregnancy Category C and it isn't known if it is safe during pregnancy. It is also excreted in breast milk. Zyclara Counseling:  I discussed with the patient the risks of imiquimod including but not limited to erythema, scaling, itching, weeping, crusting, and pain.  Patient understands that the inflammatory response to imiquimod is variable from person to person and was educated regarded proper titration schedule.  If flu-like symptoms develop, patient knows to discontinue the medication and contact us. Carac Counseling:  I discussed with the patient the risks of Carac including but not limited to erythema, scaling, itching, weeping, crusting, and pain. Oral Minoxidil Pregnancy And Lactation Text: This medication should only be used when clearly needed if you are pregnant, attempting to become pregnant or breast feeding. Topical Clindamycin Pregnancy And Lactation Text: This medication is Pregnancy Category B and is considered safe during pregnancy. It is unknown if it is excreted in breast milk. Enbrel Counseling:  I discussed with the patient the risks of etanercept including but not limited to myelosuppression, immunosuppression, autoimmune hepatitis, demyelinating diseases, lymphoma, and infections.  The patient understands that monitoring is required including a PPD at baseline and must alert us or the primary physician if symptoms of infection or other concerning signs are noted. Methotrexate Pregnancy And Lactation Text: This medication is Pregnancy Category X and is known to cause fetal harm. This medication is excreted in breast milk. Erythromycin Counseling:  I discussed with the patient the risks of erythromycin including but not limited to GI upset, allergic reaction, drug rash, diarrhea, increase in liver enzymes, and yeast infections. Azithromycin Pregnancy And Lactation Text: This medication is considered safe during pregnancy and is also secreted in breast milk. Azathioprine Counseling:  I discussed with the patient the risks of azathioprine including but not limited to myelosuppression, immunosuppression, hepatotoxicity, lymphoma, and infections.  The patient understands that monitoring is required including baseline LFTs, Creatinine, possible TPMP genotyping and weekly CBCs for the first month and then every 2 weeks thereafter.  The patient verbalized understanding of the proper use and possible adverse effects of azathioprine.  All of the patient's questions and concerns were addressed. Low Dose Naltrexone Counseling- I discussed with the patient the potential risks and side effects of low dose naltrexone including but not limited to: more vivid dreams, headaches, nausea, vomiting, abdominal pain, fatigue, dizziness, and anxiety. Sotyktu Counseling:  I discussed the most common side effects of Sotyktu including: common cold, sore throat, sinus infections, cold sores, canker sores, folliculitis, and acne.? I also discussed more serious side effects of Sotyktu including but not limited to: serious allergic reactions; increased risk for infections such as TB; cancers such as lymphomas; rhabdomyolysis and elevated CPK; and elevated triglycerides and liver enzymes.? Tranexamic Acid Pregnancy And Lactation Text: It is unknown if this medication is safe during pregnancy or breast feeding. Griseofulvin Counseling:  I discussed with the patient the risks of griseofulvin including but not limited to photosensitivity, cytopenia, liver damage, nausea/vomiting and severe allergy.  The patient understands that this medication is best absorbed when taken with a fatty meal (e.g., ice cream or french fries). Rhofade Counseling: Rhofade is a topical medication which can decrease superficial blood flow where applied. Side effects are uncommon and include stinging, redness and allergic reactions. Minoxidil Pregnancy And Lactation Text: This medication has not been assigned a Pregnancy Risk Category but animal studies failed to show danger with the topical medication. It is unknown if the medication is excreted in breast milk. Oxybutynin Counseling:  I discussed with the patient the risks of oxybutynin including but not limited to skin rash, drowsiness, dry mouth, difficulty urinating, and blurred vision. Taltz Pregnancy And Lactation Text: The risk during pregnancy and breastfeeding is uncertain with this medication. Cimetidine Pregnancy And Lactation Text: This medication is Pregnancy Category B and is considered safe during pregnancy. It is also excreted in breast milk and breast feeding isn't recommended. Isotretinoin Counseling: Patient should get monthly blood tests, not donate blood, not drive at night if vision affected, not share medication, and not undergo elective surgery for 6 months after tx completed. Side effects reviewed, pt to contact office should one occur. Carac Pregnancy And Lactation Text: This medication is Pregnancy Category X and contraindicated in pregnancy and in women who may become pregnant. It is unknown if this medication is excreted in breast milk. Otezla Counseling: The side effects of Otezla were discussed with the patient, including but not limited to worsening or new depression, weight loss, diarrhea, nausea, upper respiratory tract infection, and headache. Patient instructed to call the office should any adverse effect occur.  The patient verbalized understanding of the proper use and possible adverse effects of Otezla.  All the patient's questions and concerns were addressed. Prednisone Counseling:  I discussed with the patient the risks of prolonged use of prednisone including but not limited to weight gain, insomnia, osteoporosis, mood changes, diabetes, susceptibility to infection, glaucoma and high blood pressure.  In cases where prednisone use is prolonged, patients should be monitored with blood pressure checks, serum glucose levels and an eye exam.  Additionally, the patient may need to be placed on GI prophylaxis, PCP prophylaxis, and calcium and vitamin D supplementation and/or a bisphosphonate.  The patient verbalized understanding of the proper use and the possible adverse effects of prednisone.  All of the patient's questions and concerns were addressed. Siliq Counseling:  I discussed with the patient the risks of Siliq including but not limited to new or worsening depression, suicidal thoughts and behavior, immunosuppression, malignancy, posterior leukoencephalopathy syndrome, and serious infections.  The patient understands that monitoring is required including a PPD at baseline and must alert us or the primary physician if symptoms of infection or other concerning signs are noted. There is also a special program designed to monitor depression which is required with Siliq. Erythromycin Pregnancy And Lactation Text: This medication is Pregnancy Category B and is considered safe during pregnancy. It is also excreted in breast milk. Topical Ketoconazole Counseling: Patient counseled that this medication may cause skin irritation or allergic reactions.  In the event of skin irritation, the patient was advised to reduce the amount of the drug applied or use it less frequently.   The patient verbalized understanding of the proper use and possible adverse effects of ketoconazole.  All of the patient's questions and concerns were addressed. Rifampin Counseling: I discussed with the patient the risks of rifampin including but not limited to liver damage, kidney damage, red-orange body fluids, nausea/vomiting and severe allergy. Dutasteride Male Counseling: Dustasteride Counseling:  I discussed with the patient the risks of use of dutasteride including but not limited to decreased libido, decreased ejaculate volume, and gynecomastia. Women who can become pregnant should not handle medication.  All of the patient's questions and concerns were addressed. Low Dose Naltrexone Pregnancy And Lactation Text: Naltrexone is pregnancy category C.  There have been no adequate and well-controlled studies in pregnant women.  It should be used in pregnancy only if the potential benefit justifies the potential risk to the fetus.   Limited data indicates that naltrexone is minimally excreted into breastmilk. Bactrim Counseling:  I discussed with the patient the risks of sulfa antibiotics including but not limited to GI upset, allergic reaction, drug rash, diarrhea, dizziness, photosensitivity, and yeast infections.  Rarely, more serious reactions can occur including but not limited to aplastic anemia, agranulocytosis, methemoglobinemia, blood dyscrasias, liver or kidney failure, lung infiltrates or desquamative/blistering drug rashes. Azathioprine Pregnancy And Lactation Text: This medication is Pregnancy Category D and isn't considered safe during pregnancy. It is unknown if this medication is excreted in breast milk. Adbry Counseling: I discussed with the patient the risks of tralokinumab including but not limited to eye infection and irritation, cold sores, injection site reactions, worsening of asthma, allergic reactions and increased risk of parasitic infection.  Live vaccines should be avoided while taking tralokinumab. The patient understands that monitoring is required and they must alert us or the primary physician if symptoms of infection or other concerning signs are noted. Sotyktu Pregnancy And Lactation Text: There is insufficient data to evaluate whether or not Sotyktu is safe to use during pregnancy.? ?It is not known if Sotyktu passes into breast milk and whether or not it is safe to use when breastfeeding.?? Xolair Counseling:  Patient informed of potential adverse effects including but not limited to fever, muscle aches, rash and allergic reactions.  The patient verbalized understanding of the proper use and possible adverse effects of Xolair.  All of the patient's questions and concerns were addressed. Tremfya Counseling: I discussed with the patient the risks of guselkumab including but not limited to immunosuppression, serious infections, and drug reactions.  The patient understands that monitoring is required including a PPD at baseline and must alert us or the primary physician if symptoms of infection or other concerning signs are noted. Griseofulvin Pregnancy And Lactation Text: This medication is Pregnancy Category X and is known to cause serious birth defects. It is unknown if this medication is excreted in breast milk but breast feeding should be avoided. Valtrex Counseling: I discussed with the patient the risks of valacyclovir including but not limited to kidney damage, nausea, vomiting and severe allergy.  The patient understands that if the infection seems to be worsening or is not improving, they are to call. Detail Level: Detailed Rhofade Pregnancy And Lactation Text: This medication has not been assigned a Pregnancy Risk Category. It is unknown if the medication is excreted in breast milk. Dapsone Counseling: I discussed with the patient the risks of dapsone including but not limited to hemolytic anemia, agranulocytosis, rashes, methemoglobinemia, kidney failure, peripheral neuropathy, headaches, GI upset, and liver toxicity.  Patients who start dapsone require monitoring including baseline LFTs and weekly CBCs for the first month, then every month thereafter.  The patient verbalized understanding of the proper use and possible adverse effects of dapsone.  All of the patient's questions and concerns were addressed. Isotretinoin Pregnancy And Lactation Text: This medication is Pregnancy Category X and is considered extremely dangerous during pregnancy. It is unknown if it is excreted in breast milk. Mirvaso Counseling: Mirvaso is a topical medication which can decrease superficial blood flow where applied. Side effects are uncommon and include stinging, redness and allergic reactions. Calcipotriene Counseling:  I discussed with the patient the risks of calcipotriene including but not limited to erythema, scaling, itching, and irritation. Doxepin Counseling:  Patient advised that the medication is sedating and not to drive a car after taking this medication. Patient informed of potential adverse effects including but not limited to dry mouth, urinary retention, and blurry vision.  The patient verbalized understanding of the proper use and possible adverse effects of doxepin.  All of the patient's questions and concerns were addressed. Humira Counseling:  I discussed with the patient the risks of adalimumab including but not limited to myelosuppression, immunosuppression, autoimmune hepatitis, demyelinating diseases, lymphoma, and serious infections.  The patient understands that monitoring is required including a PPD at baseline and must alert us or the primary physician if symptoms of infection or other concerning signs are noted. Prednisone Pregnancy And Lactation Text: This medication is Pregnancy Category C and it isn't know if it is safe during pregnancy. This medication is excreted in breast milk. Dutasteride Pregnancy And Lactation Text: This medication is absolutely contraindicated in women, especially during pregnancy and breast feeding. Feminization of male fetuses is possible if taking while pregnant. Rifampin Pregnancy And Lactation Text: This medication is Pregnancy Category C and it isn't know if it is safe during pregnancy. It is also excreted in breast milk and should not be used if you are breast feeding. Xolair Pregnancy And Lactation Text: This medication is Pregnancy Category B and is considered safe during pregnancy. This medication is excreted in breast milk. Niacinamide Counseling: I recommended taking niacin or niacinamide, also know as vitamin B3, twice daily. Recent evidence suggests that taking vitamin B3 (500 mg twice daily) can reduce the risk of actinic keratoses and non-melanoma skin cancers. Side effects of vitamin B3 include flushing and headache. Xeljanz Counseling: I discussed with the patient the risks of Xeljanz therapy including increased risk of infection, liver issues, headache, diarrhea, or cold symptoms. Live vaccines should be avoided. They were instructed to call if they have any problems. Solaraze Counseling:  I discussed with the patient the risks of Solaraze including but not limited to erythema, scaling, itching, weeping, crusting, and pain. Itraconazole Counseling:  I discussed with the patient the risks of itraconazole including but not limited to liver damage, nausea/vomiting, neuropathy, and severe allergy.  The patient understands that this medication is best absorbed when taken with acidic beverages such as non-diet cola or ginger ale.  The patient understands that monitoring is required including baseline LFTs and repeat LFTs at intervals.  The patient understands that they are to contact us or the primary physician if concerning signs are noted. Opioid Counseling: I discussed with the patient the potential side effects of opioids including but not limited to addiction, altered mental status, and depression. I stressed avoiding alcohol, benzodiazepines, muscle relaxants and sleep aids unless specifically okayed by a physician. The patient verbalized understanding of the proper use and possible adverse effects of opioids. All of the patient's questions and concerns were addressed. They were instructed to flush the remaining pills down the toilet if they did not need them for pain. Cellcept Counseling:  I discussed with the patient the risks of mycophenolate mofetil including but not limited to infection/immunosuppression, GI upset, hypokalemia, hypercholesterolemia, bone marrow suppression, lymphoproliferative disorders, malignancy, GI ulceration/bleed/perforation, colitis, interstitial lung disease, kidney failure, progressive multifocal leukoencephalopathy, and birth defects.  The patient understands that monitoring is required including a baseline creatinine and regular CBC testing. In addition, patient must alert us immediately if symptoms of infection or other concerning signs are noted. Adbry Pregnancy And Lactation Text: It is unknown if this medication will adversely affect pregnancy or breast feeding. Bactrim Pregnancy And Lactation Text: This medication is Pregnancy Category D and is known to cause fetal risk.  It is also excreted in breast milk. High Dose Vitamin A Counseling: Side effects reviewed, pt to contact office should one occur. Valtrex Pregnancy And Lactation Text: this medication is Pregnancy Category B and is considered safe during pregnancy. This medication is not directly found in breast milk but it's metabolite acyclovir is present. Doxepin Pregnancy And Lactation Text: This medication is Pregnancy Category C and it isn't known if it is safe during pregnancy. It is also excreted in breast milk and breast feeding isn't recommended. Dapsone Pregnancy And Lactation Text: This medication is Pregnancy Category C and is not considered safe during pregnancy or breast feeding. Propranolol Counseling:  I discussed with the patient the risks of propranolol including but not limited to low heart rate, low blood pressure, low blood sugar, restlessness and increased cold sensitivity. They should call the office if they experience any of these side effects. Calcipotriene Pregnancy And Lactation Text: This medication has not been proven safe during pregnancy. It is unknown if this medication is excreted in breast milk. Eucrisa Counseling: Patient may experience a mild burning sensation during topical application. Eucrisa is not approved in children less than 2 years of age. Simponi Counseling:  I discussed with the patient the risks of golimumab including but not limited to myelosuppression, immunosuppression, autoimmune hepatitis, demyelinating diseases, lymphoma, and serious infections.  The patient understands that monitoring is required including a PPD at baseline and must alert us or the primary physician if symptoms of infection or other concerning signs are noted. Finasteride Male Counseling: Finasteride Counseling:  I discussed with the patient the risks of use of finasteride including but not limited to decreased libido, decreased ejaculate volume, gynecomastia, and depression. Women should not handle medication.  All of the patient's questions and concerns were addressed. Opioid Pregnancy And Lactation Text: These medications can lead to premature delivery and should be avoided during pregnancy. These medications are also present in breast milk in small amounts. Xeleaglez Pregnancy And Lactation Text: This medication is Pregnancy Category D and is not considered safe during pregnancy.  The risk during breast feeding is also uncertain. Topical Sulfur Applications Counseling: Topical Sulfur Counseling: Patient counseled that this medication may cause skin irritation or allergic reactions.  In the event of skin irritation, the patient was advised to reduce the amount of the drug applied or use it less frequently.   The patient verbalized understanding of the proper use and possible adverse effects of topical sulfur application.  All of the patient's questions and concerns were addressed. Solaraze Pregnancy And Lactation Text: This medication is Pregnancy Category B and is considered safe. There is some data to suggest avoiding during the third trimester. It is unknown if this medication is excreted in breast milk. Niacinamide Pregnancy And Lactation Text: These medications are considered safe during pregnancy. Sarecycline Counseling: Patient advised regarding possible photosensitivity and discoloration of the teeth, skin, lips, tongue and gums.  Patient instructed to avoid sunlight, if possible.  When exposed to sunlight, patients should wear protective clothing, sunglasses, and sunscreen.  The patient was instructed to call the office immediately if the following severe adverse effects occur:  hearing changes, easy bruising/bleeding, severe headache, or vision changes.  The patient verbalized understanding of the proper use and possible adverse effects of sarecycline.  All of the patient's questions and concerns were addressed. Cephalexin Counseling: I counseled the patient regarding use of cephalexin as an antibiotic for prophylactic and/or therapeutic purposes. Cephalexin (commonly prescribed under brand name Keflex) is a cephalosporin antibiotic which is active against numerous classes of bacteria, including most skin bacteria. Side effects may include nausea, diarrhea, gastrointestinal upset, rash, hives, yeast infections, and in rare cases, hepatitis, kidney disease, seizures, fever, confusion, neurologic symptoms, and others. Patients with severe allergies to penicillin medications are cautioned that there is about a 10% incidence of cross-reactivity with cephalosporins. When possible, patients with penicillin allergies should use alternatives to cephalosporins for antibiotic therapy. Gabapentin Counseling: I discussed with the patient the risks of gabapentin including but not limited to dizziness, somnolence, fatigue and ataxia. Cimzia Counseling:  I discussed with the patient the risks of Cimzia including but not limited to immunosuppression, allergic reactions and infections.  The patient understands that monitoring is required including a PPD at baseline and must alert us or the primary physician if symptoms of infection or other concerning signs are noted. Hydroxyzine Counseling: Patient advised that the medication is sedating and not to drive a car after taking this medication.  Patient informed of potential adverse effects including but not limited to dry mouth, urinary retention, and blurry vision.  The patient verbalized understanding of the proper use and possible adverse effects of hydroxyzine.  All of the patient's questions and concerns were addressed. Include Pregnancy/Lactation Warning?: No High Dose Vitamin A Pregnancy And Lactation Text: High dose vitamin A therapy is contraindicated during pregnancy and breast feeding. Cibinqo Counseling: I discussed with the patient the risks of Cibinqo therapy including but not limited to common cold, nausea, headache, cold sores, increased blood CPK levels, dizziness, UTIs, fatigue, acne, and vomitting. Live vaccines should be avoided.  This medication has been linked to serious infections; higher rate of mortality; malignancy and lymphoproliferative disorders; major adverse cardiovascular events; thrombosis; thrombocytopenia and lymphopenia; lipid elevations; and retinal detachment. Opzelura Counseling:  I discussed with the patient the risks of Opzelura including but not limited to nasopharngitis, bronchitis, ear infection, eosinophila, hives, diarrhea, folliculitis, tonsillitis, and rhinorrhea.  Taken orally, this medication has been linked to serious infections; higher rate of mortality; malignancy and lymphoproliferative disorders; major adverse cardiovascular events; thrombosis; thrombocytopenia, anemia, and neutropenia; and lipid elevations. Propranolol Pregnancy And Lactation Text: This medication is Pregnancy Category C and it isn't known if it is safe during pregnancy. It is excreted in breast milk. 5-Fu Counseling: 5-Fluorouracil Counseling:  I discussed with the patient the risks of 5-fluorouracil including but not limited to erythema, scaling, itching, weeping, crusting, and pain. Erivedge Counseling- I discussed with the patient the risks of Erivedge including but not limited to nausea, vomiting, diarrhea, constipation, weight loss, changes in the sense of taste, decreased appetite, muscle spasms, and hair loss.  The patient verbalized understanding of the proper use and possible adverse effects of Erivedge.  All of the patient's questions and concerns were addressed. Finasteride Pregnancy And Lactation Text: This medication is absolutely contraindicated during pregnancy. It is unknown if it is excreted in breast milk. Topical Sulfur Applications Pregnancy And Lactation Text: This medication is Pregnancy Category C and has an unknown safety profile during pregnancy. It is unknown if this topical medication is excreted in breast milk. Ilumya Counseling: I discussed with the patient the risks of tildrakizumab including but not limited to immunosuppression, malignancy, posterior leukoencephalopathy syndrome, and serious infections.  The patient understands that monitoring is required including a PPD at baseline and must alert us or the primary physician if symptoms of infection or other concerning signs are noted. Sarecycline Pregnancy And Lactation Text: This medication is Pregnancy Category D and not consider safe during pregnancy. It is also excreted in breast milk. Cephalexin Pregnancy And Lactation Text: This medication is Pregnancy Category B and considered safe during pregnancy.  It is also excreted in breast milk but can be used safely for shorter doses. Cimzia Pregnancy And Lactation Text: This medication crosses the placenta but can be considered safe in certain situations. Cimzia may be excreted in breast milk. Cyclophosphamide Counseling:  I discussed with the patient the risks of cyclophosphamide including but not limited to hair loss, hormonal abnormalities, decreased fertility, abdominal pain, diarrhea, nausea and vomiting, bone marrow suppression and infection. The patient understands that monitoring is required while taking this medication. Metronidazole Counseling:  I discussed with the patient the risks of metronidazole including but not limited to seizures, nausea/vomiting, a metallic taste in the mouth, nausea/vomiting and severe allergy. Nsaids Counseling: NSAID Counseling: I discussed with the patient that NSAIDs should be taken with food. Prolonged use of NSAIDs can result in the development of stomach ulcers.  Patient advised to stop taking NSAIDs if abdominal pain occurs.  The patient verbalized understanding of the proper use and possible adverse effects of NSAIDs.  All of the patient's questions and concerns were addressed. Topical Retinoid counseling:  Patient advised to apply a pea-sized amount only at bedtime and wait 30 minutes after washing their face before applying.  If too drying, patient may add a non-comedogenic moisturizer. The patient verbalized understanding of the proper use and possible adverse effects of retinoids.  All of the patient's questions and concerns were addressed. Ketoconazole Counseling:   Patient counseled regarding improving absorption with orange juice.  Adverse effects include but are not limited to breast enlargement, headache, diarrhea, nausea, upset stomach, liver function test abnormalities, taste disturbance, and stomach pain.  There is a rare possibility of liver failure that can occur when taking ketoconazole. The patient understands that monitoring of LFTs may be required, especially at baseline. The patient verbalized understanding of the proper use and possible adverse effects of ketoconazole.  All of the patient's questions and concerns were addressed. Cibinqo Pregnancy And Lactation Text: It is unknown if this medication will adversely affect pregnancy or breast feeding.  You should not take this medication if you are currently pregnant or planning a pregnancy or while breastfeeding. Opzelura Pregnancy And Lactation Text: There is insufficient data to evaluate drug-associated risk for major birth defects, miscarriage, or other adverse maternal or fetal outcomes.  There is a pregnancy registry that monitors pregnancy outcomes in pregnant persons exposed to the medication during pregnancy.  It is unknown if this medication is excreted in breast milk.  Do not breastfeed during treatment and for about 4 weeks after the last dose. Hydroxyzine Pregnancy And Lactation Text: This medication is not safe during pregnancy and should not be taken. It is also excreted in breast milk and breast feeding isn't recommended. SSKI Counseling:  I discussed with the patient the risks of SSKI including but not limited to thyroid abnormalities, metallic taste, GI upset, fever, headache, acne, arthralgias, paraesthesias, lymphadenopathy, easy bleeding, arrhythmias, and allergic reaction. Skyrizi Counseling: I discussed with the patient the risks of risankizumab-rzaa including but not limited to immunosuppression, and serious infections.  The patient understands that monitoring is required including a PPD at baseline and must alert us or the primary physician if symptoms of infection or other concerning signs are noted. Hydroquinone Counseling:  Patient advised that medication may result in skin irritation, lightening (hypopigmentation), dryness, and burning.  In the event of skin irritation, the patient was advised to reduce the amount of the drug applied or use it less frequently.  Rarely, spots that are treated with hydroquinone can become darker (pseudoochronosis).  Should this occur, patient instructed to stop medication and call the office. The patient verbalized understanding of the proper use and possible adverse effects of hydroquinone.  All of the patient's questions and concerns were addressed. Azelaic Acid Counseling: Patient counseled that medicine may cause skin irritation and to avoid applying near the eyes.  In the event of skin irritation, the patient was advised to reduce the amount of the drug applied or use it less frequently.   The patient verbalized understanding of the proper use and possible adverse effects of azelaic acid.  All of the patient's questions and concerns were addressed. Arava Counseling:  Patient counseled regarding adverse effects of Arava including but not limited to nausea, vomiting, abnormalities in liver function tests. Patients may develop mouth sores, rash, diarrhea, and abnormalities in blood counts. The patient understands that monitoring is required including LFTs and blood counts.  There is a rare possibility of scarring of the liver and lung problems that can occur when taking methotrexate. Persistent nausea, loss of appetite, pale stools, dark urine, cough, and shortness of breath should be reported immediately. Patient advised to discontinue Arava treatment and consult with a physician prior to attempting conception. The patient will have to undergo a treatment to eliminate Arava from the body prior to conception. Birth Control Pills Counseling: Birth Control Pill Counseling: I discussed with the patient the potential side effects of OCPs including but not limited to increased risk of stroke, heart attack, thrombophlebitis, deep venous thrombosis, hepatic adenomas, breast changes, GI upset, headaches, and depression.  The patient verbalized understanding of the proper use and possible adverse effects of OCPs. All of the patient's questions and concerns were addressed. Wartpeel Counseling:  I discussed with the patient the risks of Wartpeel including but not limited to erythema, scaling, itching, weeping, crusting, and pain. Tetracycline Counseling: Patient counseled regarding possible photosensitivity and increased risk for sunburn.  Patient instructed to avoid sunlight, if possible.  When exposed to sunlight, patients should wear protective clothing, sunglasses, and sunscreen.  The patient was instructed to call the office immediately if the following severe adverse effects occur:  hearing changes, easy bruising/bleeding, severe headache, or vision changes.  The patient verbalized understanding of the proper use and possible adverse effects of tetracycline.  All of the patient's questions and concerns were addressed. Patient understands to avoid pregnancy while on therapy due to potential birth defects. Nsaids Pregnancy And Lactation Text: These medications are considered safe up to 30 weeks gestation. It is excreted in breast milk. Cyclophosphamide Pregnancy And Lactation Text: This medication is Pregnancy Category D and it isn't considered safe during pregnancy. This medication is excreted in breast milk. Cosentyx Counseling:  I discussed with the patient the risks of Cosentyx including but not limited to worsening of Crohn's disease, immunosuppression, allergic reactions and infections.  The patient understands that monitoring is required including a PPD at baseline and must alert us or the primary physician if symptoms of infection or other concerning signs are noted. Clindamycin Counseling: I counseled the patient regarding use of clindamycin as an antibiotic for prophylactic and/or therapeutic purposes. Clindamycin is active against numerous classes of bacteria, including skin bacteria. Side effects may include nausea, diarrhea, gastrointestinal upset, rash, hives, yeast infections, and in rare cases, colitis. Metronidazole Pregnancy And Lactation Text: This medication is Pregnancy Category B and considered safe during pregnancy.  It is also excreted in breast milk. Ketoconazole Pregnancy And Lactation Text: This medication is Pregnancy Category C and it isn't know if it is safe during pregnancy. It is also excreted in breast milk and breast feeding isn't recommended. Glycopyrrolate Counseling:  I discussed with the patient the risks of glycopyrrolate including but not limited to skin rash, drowsiness, dry mouth, difficulty urinating, and blurred vision. Olumiant Counseling: I discussed with the patient the risks of Olumiant therapy including but not limited to upper respiratory tract infections, shingles, cold sores, and nausea. Live vaccines should be avoided.  This medication has been linked to serious infections; higher rate of mortality; malignancy and lymphoproliferative disorders; major adverse cardiovascular events; thrombosis; gastrointestinal perforations; neutropenia; lymphopenia; anemia; liver enzyme elevations; and lipid elevations. Sski Pregnancy And Lactation Text: This medication is Pregnancy Category D and isn't considered safe during pregnancy. It is excreted in breast milk. Picato Counseling:  I discussed with the patient the risks of Picato including but not limited to erythema, scaling, itching, weeping, crusting, and pain. Drysol Counseling:  I discussed with the patient the risks of drysol/aluminum chloride including but not limited to skin rash, itching, irritation, burning. Libtayo Counseling- I discussed with the patient the risks of Libtayo including but not limited to nausea, vomiting, diarrhea, and bone or muscle pain.  The patient verbalized understanding of the proper use and possible adverse effects of Libtayo.  All of the patient's questions and concerns were addressed. Acitretin Counseling:  I discussed with the patient the risks of acitretin including but not limited to hair loss, dry lips/skin/eyes, liver damage, hyperlipidemia, depression/suicidal ideation, photosensitivity.  Serious rare side effects can include but are not limited to pancreatitis, pseudotumor cerebri, bony changes, clot formation/stroke/heart attack.  Patient understands that alcohol is contraindicated since it can result in liver toxicity and significantly prolong the elimination of the drug by many years. Infliximab Counseling:  I discussed with the patient the risks of infliximab including but not limited to myelosuppression, immunosuppression, autoimmune hepatitis, demyelinating diseases, lymphoma, and serious infections.  The patient understands that monitoring is required including a PPD at baseline and must alert us or the primary physician if symptoms of infection or other concerning signs are noted. Birth Control Pills Pregnancy And Lactation Text: This medication should be avoided if pregnant and for the first 30 days post-partum. Albendazole Counseling:  I discussed with the patient the risks of albendazole including but not limited to cytopenia, kidney damage, nausea/vomiting and severe allergy.  The patient understands that this medication is being used in an off-label manner. Azelaic Acid Pregnancy And Lactation Text: This medication is considered safe during pregnancy and breast feeding. Cyclosporine Counseling:  I discussed with the patient the risks of cyclosporine including but not limited to hypertension, gingival hyperplasia,myelosuppression, immunosuppression, liver damage, kidney damage, neurotoxicity, lymphoma, and serious infections. The patient understands that monitoring is required including baseline blood pressure, CBC, CMP, lipid panel and uric acid, and then 1-2 times monthly CMP and blood pressure. Olanzapine Counseling- I discussed with the patient the common side effects of olanzapine including but are not limited to: lack of energy, dry mouth, increased appetite, sleepiness, tremor, constipation, dizziness, changes in behavior, or restlessness.  Explained that teenagers are more likely to experience headaches, abdominal pain, pain in the arms or legs, tiredness, and sleepiness.  Serious side effects include but are not limited: increased risk of death in elderly patients who are confused, have memory loss, or dementia-related psychosis; hyperglycemia; increased cholesterol and triglycerides; and weight gain. Clindamycin Pregnancy And Lactation Text: This medication can be used in pregnancy if certain situations. Clindamycin is also present in breast milk. Terbinafine Counseling: Patient counseling regarding adverse effects of terbinafine including but not limited to headache, diarrhea, rash, upset stomach, liver function test abnormalities, itching, taste/smell disturbance, nausea, abdominal pain, and flatulence.  There is a rare possibility of liver failure that can occur when taking terbinafine.  The patient understands that a baseline LFT and kidney function test may be required. The patient verbalized understanding of the proper use and possible adverse effects of terbinafine.  All of the patient's questions and concerns were addressed. Tazorac Counseling:  Patient advised that medication is irritating and drying.  Patient may need to apply sparingly and wash off after an hour before eventually leaving it on overnight.  The patient verbalized understanding of the proper use and possible adverse effects of tazorac.  All of the patient's questions and concerns were addressed. Olumiant Pregnancy And Lactation Text: Based on animal studies, Olumiant may cause embryo-fetal harm when administered to pregnant women.  The medication should not be used in pregnancy.  Breastfeeding is not recommended during treatment. Minocycline Counseling: Patient advised regarding possible photosensitivity and discoloration of the teeth, skin, lips, tongue and gums.  Patient instructed to avoid sunlight, if possible.  When exposed to sunlight, patients should wear protective clothing, sunglasses, and sunscreen.  The patient was instructed to call the office immediately if the following severe adverse effects occur:  hearing changes, easy bruising/bleeding, severe headache, or vision changes.  The patient verbalized understanding of the proper use and possible adverse effects of minocycline.  All of the patient's questions and concerns were addressed. Glycopyrrolate Pregnancy And Lactation Text: This medication is Pregnancy Category B and is considered safe during pregnancy. It is unknown if it is excreted breast milk. Clofazimine Counseling:  I discussed with the patient the risks of clofazimine including but not limited to skin and eye pigmentation, liver damage, nausea/vomiting, gastrointestinal bleeding and allergy. Libtayo Pregnancy And Lactation Text: This medication is contraindicated in pregnancy and when breast feeding. Thalidomide Counseling: I discussed with the patient the risks of thalidomide including but not limited to birth defects, anxiety, weakness, chest pain, dizziness, cough and severe allergy. Acitretin Pregnancy And Lactation Text: This medication is Pregnancy Category X and should not be given to women who are pregnant or may become pregnant in the future. This medication is excreted in breast milk. Stelara Counseling:  I discussed with the patient the risks of ustekinumab including but not limited to immunosuppression, malignancy, posterior leukoencephalopathy syndrome, and serious infections.  The patient understands that monitoring is required including a PPD at baseline and must alert us or the primary physician if symptoms of infection or other concerning signs are noted. Imiquimod Counseling:  I discussed with the patient the risks of imiquimod including but not limited to erythema, scaling, itching, weeping, crusting, and pain.  Patient understands that the inflammatory response to imiquimod is variable from person to person and was educated regarded proper titration schedule.  If flu-like symptoms develop, patient knows to discontinue the medication and contact us. Benzoyl Peroxide Counseling: Patient counseled that medicine may cause skin irritation and bleach clothing.  In the event of skin irritation, the patient was advised to reduce the amount of the drug applied or use it less frequently.   The patient verbalized understanding of the proper use and possible adverse effects of benzoyl peroxide.  All of the patient's questions and concerns were addressed. Spironolactone Counseling: Patient advised regarding risks of diarrhea, abdominal pain, hyperkalemia, birth defects (for female patients), liver toxicity and renal toxicity. The patient may need blood work to monitor liver and kidney function and potassium levels while on therapy. The patient verbalized understanding of the proper use and possible adverse effects of spironolactone.  All of the patient's questions and concerns were addressed. Dupixent Counseling: I discussed with the patient the risks of dupilumab including but not limited to eye infection and irritation, cold sores, injection site reactions, worsening of asthma, allergic reactions and increased risk of parasitic infection.  Live vaccines should be avoided while taking dupilumab. Dupilumab will also interact with certain medications such as warfarin and cyclosporine. The patient understands that monitoring is required and they must alert us or the primary physician if symptoms of infection or other concerning signs are noted. Winlevi Counseling:  I discussed with the patient the risks of topical clascoterone including but not limited to erythema, scaling, itching, and stinging. Patient voiced their understanding. Olanzapine Pregnancy And Lactation Text: This medication is pregnancy category C.   There are no adequate and well controlled trials with olanzapine in pregnant females.  Olanzapine should be used during pregnancy only if the potential benefit justifies the potential risk to the fetus.   In a study in lactating healthy women, olanzapine was excreted in breast milk.  It is recommended that women taking olanzapine should not breast feed. Tazorac Pregnancy And Lactation Text: This medication is not safe during pregnancy. It is unknown if this medication is excreted in breast milk. Doxycycline Counseling:  Patient counseled regarding possible photosensitivity and increased risk for sunburn.  Patient instructed to avoid sunlight, if possible.  When exposed to sunlight, patients should wear protective clothing, sunglasses, and sunscreen.  The patient was instructed to call the office immediately if the following severe adverse effects occur:  hearing changes, easy bruising/bleeding, severe headache, or vision changes.  The patient verbalized understanding of the proper use and possible adverse effects of doxycycline.  All of the patient's questions and concerns were addressed. Hydroxychloroquine Counseling:  I discussed with the patient that a baseline ophthalmologic exam is needed at the start of therapy and every year thereafter while on therapy. A CBC may also be warranted for monitoring.  The side effects of this medication were discussed with the patient, including but not limited to agranulocytosis, aplastic anemia, seizures, rashes, retinopathy, and liver toxicity. Patient instructed to call the office should any adverse effect occur.  The patient verbalized understanding of the proper use and possible adverse effects of Plaquenil.  All the patient's questions and concerns were addressed. Rinvoq Counseling: I discussed with the patient the risks of Rinvoq therapy including but not limited to upper respiratory tract infections, shingles, cold sores, bronchitis, nausea, cough, fever, acne, and headache. Live vaccines should be avoided.  This medication has been linked to serious infections; higher rate of mortality; malignancy and lymphoproliferative disorders; major adverse cardiovascular events; thrombosis; thrombocytopenia, anemia, and neutropenia; lipid elevations; liver enzyme elevations; and gastrointestinal perforations. Protopic Counseling: Patient may experience a mild burning sensation during topical application. Protopic is not approved in children less than 2 years of age. There have been case reports of hematologic and skin malignancies in patients using topical calcineurin inhibitors although causality is questionable. Elidel Counseling: Patient may experience a mild burning sensation during topical application. Elidel is not approved in children less than 2 years of age. There have been case reports of hematologic and skin malignancies in patients using topical calcineurin inhibitors although causality is questionable.

## 2024-03-01 ENCOUNTER — APPOINTMENT (OUTPATIENT)
Dept: ORTHOPEDIC SURGERY | Facility: CLINIC | Age: 59
End: 2024-03-01

## 2024-03-04 NOTE — H&P PST ADULT - ADMIT DATE
18-Jan-2018 Chief Complaint   Patient presents with    Coronary Artery Disease       Patient Active Problem List    Diagnosis Date Noted    AMS (altered mental status) 11/03/2023    HLD (hyperlipidemia) 08/22/2023    Morbid obesity (HCC) 08/22/2023    CAD in native artery 07/26/2023     Overview Note:     A. PCI of OM 7/26/2023 ([place]); normal LV      Moderate persistent asthma without complication 10/06/2015    Uncontrolled type 2 diabetes mellitus 03/17/2014    Obstructive sleep apnea syndrome 03/22/2011       Current Outpatient Medications   Medication Sig Dispense Refill    hydroCHLOROthiazide (HYDRODIURIL) 25 MG tablet TAKE ONE(1) TABLET BY MOUTH ONCE DAILY      loratadine (CLARITIN) 10 MG tablet Take 1 tablet by mouth daily      Menthol, Topical Analgesic, (BIOFREEZE) 4 % GEL Apply topically as needed      bisacodyl (DULCOLAX) 10 MG suppository Place 1 suppository rectally daily as needed for Constipation      magnesium hydroxide (MILK OF MAGNESIA) 400 MG/5ML suspension Take by mouth daily as needed for Constipation      Misc. Devices (CPAP MACHINE) MISC nightly      aspirin 81 MG EC tablet Take 1 tablet by mouth nightly      clopidogrel (PLAVIX) 75 MG tablet Take 1 tablet by mouth every morning      Coenzyme Q10 (CO Q-10) 200 MG CAPS Take 1 capsule by mouth every morning      divalproex (DEPAKOTE) 250 MG DR tablet Take 1 tablet by mouth 2 times daily      fenofibrate (TRICOR) 54 MG tablet Take 1 tablet by mouth every morning      losartan (COZAAR) 100 MG tablet Take 1 tablet by mouth every morning      Multiple Vitamins-Minerals (CENTRUM SILVER 50+MEN) TABS Take 1 tablet by mouth every morning      melatonin 10 MG CAPS capsule Take 1 capsule by mouth nightly as needed      insulin glargine (LANTUS;BASAGLAR) 100 UNIT/ML injection pen Inject 56 Units into the skin 2 times daily      atorvastatin (LIPITOR) 40 MG tablet Take 1 tablet by mouth nightly 30 tablet 3    metoprolol tartrate (LOPRESSOR) 50 MG tablet Take 1 tablet by

## 2024-06-27 NOTE — ASU PATIENT PROFILE, ADULT - NSCAFFEINETYPE_GEN_ALL_CORE_SD
Refill request for patient's Omer    Seen for refill of OC's on 6/14/24.  Last refill of #112 with 3 refills sent on same date to [facility].     Patient has refills on file. Request denied. Encounter closed.         coffee/tea

## 2024-10-01 ENCOUNTER — OFFICE (OUTPATIENT)
Facility: LOCATION | Age: 59
Setting detail: OPHTHALMOLOGY
End: 2024-10-01
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
## 2024-10-01 PROCEDURE — NO SHOW FE NO SHOW FEE: Performed by: OPHTHALMOLOGY

## 2024-10-11 ENCOUNTER — EMERGENCY (EMERGENCY)
Facility: HOSPITAL | Age: 59
LOS: 1 days | Discharge: HOME | End: 2024-10-11
Attending: EMERGENCY MEDICINE | Admitting: STUDENT IN AN ORGANIZED HEALTH CARE EDUCATION/TRAINING PROGRAM
Payer: COMMERCIAL

## 2024-10-11 VITALS
WEIGHT: 169.09 LBS | HEART RATE: 78 BPM | SYSTOLIC BLOOD PRESSURE: 133 MMHG | DIASTOLIC BLOOD PRESSURE: 93 MMHG | OXYGEN SATURATION: 100 % | HEIGHT: 63 IN | TEMPERATURE: 98 F | RESPIRATION RATE: 16 BRPM

## 2024-10-11 DIAGNOSIS — Z87.2 PERSONAL HISTORY OF DISEASES OF THE SKIN AND SUBCUTANEOUS TISSUE: Chronic | ICD-10-CM

## 2024-10-11 DIAGNOSIS — Z98.51 TUBAL LIGATION STATUS: Chronic | ICD-10-CM

## 2024-10-11 DIAGNOSIS — Z98.890 OTHER SPECIFIED POSTPROCEDURAL STATES: Chronic | ICD-10-CM

## 2024-10-11 LAB
ADD ON TEST-SPECIMEN IN LAB: SIGNIFICANT CHANGE UP
ALBUMIN SERPL ELPH-MCNC: 4.4 G/DL — SIGNIFICANT CHANGE UP (ref 3.3–5)
ALP SERPL-CCNC: 100 U/L — SIGNIFICANT CHANGE UP (ref 40–120)
ALT FLD-CCNC: 27 U/L — SIGNIFICANT CHANGE UP (ref 4–33)
ANION GAP SERPL CALC-SCNC: 13 MMOL/L — SIGNIFICANT CHANGE UP (ref 7–14)
APPEARANCE UR: CLEAR — SIGNIFICANT CHANGE UP
AST SERPL-CCNC: 22 U/L — SIGNIFICANT CHANGE UP (ref 4–32)
BASOPHILS # BLD AUTO: 0.03 K/UL — SIGNIFICANT CHANGE UP (ref 0–0.2)
BASOPHILS NFR BLD AUTO: 0.5 % — SIGNIFICANT CHANGE UP (ref 0–2)
BILIRUB SERPL-MCNC: 0.2 MG/DL — SIGNIFICANT CHANGE UP (ref 0.2–1.2)
BILIRUB UR-MCNC: NEGATIVE — SIGNIFICANT CHANGE UP
BLOOD GAS VENOUS COMPREHENSIVE RESULT: SIGNIFICANT CHANGE UP
BUN SERPL-MCNC: 10 MG/DL — SIGNIFICANT CHANGE UP (ref 7–23)
CALCIUM SERPL-MCNC: 9.7 MG/DL — SIGNIFICANT CHANGE UP (ref 8.4–10.5)
CHLORIDE SERPL-SCNC: 99 MMOL/L — SIGNIFICANT CHANGE UP (ref 98–107)
CO2 SERPL-SCNC: 26 MMOL/L — SIGNIFICANT CHANGE UP (ref 22–31)
COLOR SPEC: YELLOW — SIGNIFICANT CHANGE UP
CREAT SERPL-MCNC: 0.52 MG/DL — SIGNIFICANT CHANGE UP (ref 0.5–1.3)
DIFF PNL FLD: NEGATIVE — SIGNIFICANT CHANGE UP
EGFR: 107 ML/MIN/1.73M2 — SIGNIFICANT CHANGE UP
EOSINOPHIL # BLD AUTO: 0.1 K/UL — SIGNIFICANT CHANGE UP (ref 0–0.5)
EOSINOPHIL NFR BLD AUTO: 1.7 % — SIGNIFICANT CHANGE UP (ref 0–6)
GLUCOSE SERPL-MCNC: 327 MG/DL — HIGH (ref 70–99)
GLUCOSE UR QL: >=1000 MG/DL
HCT VFR BLD CALC: 39.3 % — SIGNIFICANT CHANGE UP (ref 34.5–45)
HGB BLD-MCNC: 13.1 G/DL — SIGNIFICANT CHANGE UP (ref 11.5–15.5)
IANC: 2.46 K/UL — SIGNIFICANT CHANGE UP (ref 1.8–7.4)
IMM GRANULOCYTES NFR BLD AUTO: 0.2 % — SIGNIFICANT CHANGE UP (ref 0–0.9)
KETONES UR-MCNC: NEGATIVE MG/DL — SIGNIFICANT CHANGE UP
LEUKOCYTE ESTERASE UR-ACNC: NEGATIVE — SIGNIFICANT CHANGE UP
LIDOCAIN IGE QN: 32 U/L — SIGNIFICANT CHANGE UP (ref 7–60)
LYMPHOCYTES # BLD AUTO: 3.08 K/UL — SIGNIFICANT CHANGE UP (ref 1–3.3)
LYMPHOCYTES # BLD AUTO: 50.8 % — HIGH (ref 13–44)
MCHC RBC-ENTMCNC: 28.2 PG — SIGNIFICANT CHANGE UP (ref 27–34)
MCHC RBC-ENTMCNC: 33.3 GM/DL — SIGNIFICANT CHANGE UP (ref 32–36)
MCV RBC AUTO: 84.7 FL — SIGNIFICANT CHANGE UP (ref 80–100)
MONOCYTES # BLD AUTO: 0.38 K/UL — SIGNIFICANT CHANGE UP (ref 0–0.9)
MONOCYTES NFR BLD AUTO: 6.3 % — SIGNIFICANT CHANGE UP (ref 2–14)
NEUTROPHILS # BLD AUTO: 2.46 K/UL — SIGNIFICANT CHANGE UP (ref 1.8–7.4)
NEUTROPHILS NFR BLD AUTO: 40.5 % — LOW (ref 43–77)
NITRITE UR-MCNC: NEGATIVE — SIGNIFICANT CHANGE UP
NRBC # BLD: 0 /100 WBCS — SIGNIFICANT CHANGE UP (ref 0–0)
NRBC # FLD: 0 K/UL — SIGNIFICANT CHANGE UP (ref 0–0)
PH UR: 5.5 — SIGNIFICANT CHANGE UP (ref 5–8)
PLATELET # BLD AUTO: 333 K/UL — SIGNIFICANT CHANGE UP (ref 150–400)
POTASSIUM SERPL-MCNC: 4.4 MMOL/L — SIGNIFICANT CHANGE UP (ref 3.5–5.3)
POTASSIUM SERPL-SCNC: 4.4 MMOL/L — SIGNIFICANT CHANGE UP (ref 3.5–5.3)
PROT SERPL-MCNC: 8 G/DL — SIGNIFICANT CHANGE UP (ref 6–8.3)
PROT UR-MCNC: NEGATIVE MG/DL — SIGNIFICANT CHANGE UP
RBC # BLD: 4.64 M/UL — SIGNIFICANT CHANGE UP (ref 3.8–5.2)
RBC # FLD: 13.1 % — SIGNIFICANT CHANGE UP (ref 10.3–14.5)
SODIUM SERPL-SCNC: 138 MMOL/L — SIGNIFICANT CHANGE UP (ref 135–145)
SP GR SPEC: 1.04 — HIGH (ref 1–1.03)
UROBILINOGEN FLD QL: 0.2 MG/DL — SIGNIFICANT CHANGE UP (ref 0.2–1)
WBC # BLD: 6.06 K/UL — SIGNIFICANT CHANGE UP (ref 3.8–10.5)
WBC # FLD AUTO: 6.06 K/UL — SIGNIFICANT CHANGE UP (ref 3.8–10.5)

## 2024-10-11 PROCEDURE — 74177 CT ABD & PELVIS W/CONTRAST: CPT | Mod: 26,MC

## 2024-10-11 PROCEDURE — 99285 EMERGENCY DEPT VISIT HI MDM: CPT

## 2024-10-11 RX ORDER — SODIUM CHLORIDE 0.9 % (FLUSH) 0.9 %
1000 SYRINGE (ML) INJECTION ONCE
Refills: 0 | Status: COMPLETED | OUTPATIENT
Start: 2024-10-11 | End: 2024-10-11

## 2024-10-11 RX ORDER — ACETAMINOPHEN 325 MG
1000 TABLET ORAL ONCE
Refills: 0 | Status: COMPLETED | OUTPATIENT
Start: 2024-10-11 | End: 2024-10-11

## 2024-10-11 RX ADMIN — Medication 400 MILLIGRAM(S): at 22:26

## 2024-10-11 RX ADMIN — Medication 0.5 MILLIGRAM(S): at 23:46

## 2024-10-11 RX ADMIN — Medication 1000 MILLILITER(S): at 23:45

## 2024-10-11 NOTE — ED ADULT NURSE NOTE - PAIN RATING/NUMBER SCALE (0-10): ACTIVITY
Take antibiotics and steroids with food.  Wound Care: Twice daily wound care as discussed.   Pain: Take OTC Tylenol or Ibuprofen per package instructions as needed for pain.    Drink plenty of fluids. Get plenty of rest.   Claritin, Zyrtec, or other over-the-counter antihistamine for runny nose, postnasal drip, nasal congestion.  Nasal spray such as Nasacort or Flonase for congestion.  Over-the-counter cough medication as needed and as directed.  Over-the-counter decongestants such as Sudafed, phenylephrine or pseudoephedrine.  Avoid these if you have a history of high blood pressure.  Warm saltwater gargles for sore throat.  Warm water with honey to help coat the throat.  Throat lozenges.  Chloraseptic spray for worsening sore throat.      Call or return to clinic as needed   Go to the ER with any significant change or worsening of symptoms.   Follow up with your primary care doctor.      9 (severe pain)

## 2024-10-11 NOTE — ED ADULT NURSE NOTE - OBJECTIVE STATEMENT
Pt arrives to intake. Pt is A and Ox4 and ambulatory. Pt arrives to the ED complaining of abdominal pain for the past few days. Pt endorses that the pain comes and goes and is sharp in nature. Pt states that the pain is becoming more frequent. Pt also endorses she has been having congestion and post-nasal drip. Airway is patent, respirations are even and unlabored. Pt denies chest pain, shortness of breath, headaches, dizziness, numbness and tingling, fever and chills, nausea/vomitting/diarrhea. Skin is clean, dry, intact, and appropriate for race.  20 G IV placed in the L hand. Labs sent per provider orders. Pt medicated per MAR. Plan of care ongoing, safety maintained.

## 2024-10-11 NOTE — ED PROVIDER NOTE - PATIENT PORTAL LINK FT
You can access the FollowMyHealth Patient Portal offered by [facility] by registering at the following website: [email or web address]. By joining [facility]’s FollowMyHealth portal, you will also be able to view your health information using other applications (apps) compatible with our system.

## 2024-10-11 NOTE — ED PROVIDER NOTE - PHYSICAL EXAMINATION
GEN:  Non-toxic appearing, non-distressed, speaking full sentences, non-diaphoretic, AAOx3  HEENT:  NCAT, neck supple, EOMI, PERRLA, sclera anicteric, no conjunctival pallor or injection, no stridor, normal voice, no tonsillar exudate, uvula midline  CV:  regular rhythm and rate, s1/s2 audible, no murmurs, rubs or gallops, peripheral pulses 2+ and symmetric  PULM:  non-labored respirations, lungs clear to auscultation bilaterally, no wheezes, crackles or rales  ABD:  non distended, diffusely and mildly tender, no rebound, no guarding, negative Craig's sign, bowel sounds normal, no cvat  MSK:  no gross deformity, non-tender extremities and joints, range of motion grossly normal appearing, no extremity edema, extremities warm and well perfused   NEURO:  AAOx3, CN II-XII intact, motor 5/5 in upper and lower extremities bilaterally, sensation grossly intact in extremities and trunk, no gait deficit  SKIN:  warm, dry, no rash or vesicles

## 2024-10-11 NOTE — ED ADULT NURSE REASSESSMENT NOTE - NS ED NURSE REASSESS COMMENT FT1
pt claustrophobic, provider elizabeth made aware and pt medicated with ativan for CTscan. pt brought to CT

## 2024-10-11 NOTE — ED PROVIDER NOTE - OBJECTIVE STATEMENT
59-year-old female past medical history of uterine fibroids status post resection, diabetes presents for abdominal pain for 1 week.  Pain is in the lower abdomen, radiating to the lumbar back, intermittent, sharp, aching, no aggravating or alleviating factors, associated with urinary frequency without dysuria or hematuria.  Patient went to [facility] today where she had negative urinalysis.  No recent travel or sick contacts.  Denies fever, chills, nausea, vomiting, diarrhea, constipation, bloody or dark bowel movements, chest pain, shortness of breath.  Denies trauma or heavy lifting.

## 2024-10-11 NOTE — ED PROVIDER NOTE - SHIFT CHANGE DETAILS
ALEX: Patient signed out to Dr. Rooney pending labs, ct, reassessment.   HD stable at time of signout.

## 2024-10-11 NOTE — ED PROVIDER NOTE - NSFOLLOWUPINSTRUCTIONS_ED_ALL_ED_FT
Seek immediate medical assistance for any new or worsening symptoms. If you have issues obtaining follow up, please call: [phone] ([phone] or [phone]  to obtain a doctor or specialist who takes your insurance in your area.     Acute Abdominal Pain    WHAT YOU NEED TO KNOW:  The cause of your abdominal pain may not be found. If a cause is found, treatment will depend on what the cause is.    DISCHARGE INSTRUCTIONS:    Seek care immediately if:  You vomit blood or cannot stop vomiting.  You have blood in your bowel movement or it looks like tar.  You have bleeding from your rectum.  Your abdomen is larger than usual, more painful, and hard.  You have severe pain in your abdomen.  You stop passing gas and having bowel movements.  You feel weak, dizzy, or faint.  Contact your healthcare provider if:  You have a fever.  You have new signs and symptoms.  Your symptoms do not get better with treatment.  You have questions or concerns about your condition or care.  Medicines may be given to decrease pain, treat an infection, and manage your symptoms. Take your medicine as directed. Call your healthcare provider if you think your medicine is not helping or if you have side effects. Tell him if you are allergic to any medicine. Keep a list of the medicines, vitamins, and herbs you take. Include the amounts, and when and why you take them. Bring the list or the pill bottles to follow-up visits. Carry your medicine list with you in case of an emergency.    Manage your symptoms:    Apply heat on your abdomen for 20 to 30 minutes every 2 hours for as many days as directed. Heat helps decrease pain and muscle spasms.  Manage your stress. Stress may cause abdominal pain. Your healthcare provider may recommend relaxation techniques and deep breathing exercises to help decrease your stress. Your healthcare provider may recommend you talk to someone about your stress or anxiety, such as a counselor or a trusted friend. Get plenty of sleep and exercise regularly.  Limit or do not drink alcohol. Alcohol can make your abdominal pain worse. Ask your healthcare provider if it is safe for you to drink alcohol. Also ask how much is safe for you to drink.  Do not smoke. Nicotine and other chemicals in cigarettes can damage your esophagus and stomach. Ask your healthcare provider for information if you currently smoke and need help to quit. E-cigarettes or smokeless tobacco still contain nicotine. Talk to your healthcare provider before you use these products.  Make changes to the food you eat as directed: Do not eat foods that cause abdominal pain or other symptoms. Eat small meals more often.  Eat more high-fiber foods if you are constipated. High-fiber foods include fruits, vegetables, whole-grain foods, and legumes.  Do not eat foods that cause gas if you have bloating. Examples include broccoli, cabbage, and cauliflower. Do not drink soda or carbonated drinks, because these may also cause gas.  Do not eat foods or drinks that contain sorbitol or fructose if you have diarrhea and bloating. Some examples are fruit juices, candy, jelly, and sugar-free gum.  Do not eat high-fat foods, such as fried foods, cheeseburgers, hot dogs, and desserts.  Limit or do not drink caffeine. Caffeine may make symptoms, such as heart burn or nausea, worse.  Drink plenty of liquids to prevent dehydration from diarrhea or vomiting. Ask your healthcare provider how much liquid to drink each day and which liquids are best for you.  Follow up with your healthcare provider as directed: Write down your questions so you remember to ask them during your visits.  Dolor abdominal    LO QUE NECESITA SABER:    El dolor abdominal puede ser sordo, molesto, o tu. Usted puede sentir dolor localizado en jazmine derrick área del abdomen o en todo el abdomen. El dolor puede ser causado por jazmine afección nini estreñimiento, sensibilidad o intoxicación alimentaria, infección o jazmine obstrucción. Asimismo, el dolor abdominal puede deberse a jazmine hernia, apendicitis o jazmine úlcera. Las enfermedades del hígado, la vesícula o el riñón también pueden causar dolor abdominal. La causa del dolor abdominal puede ser desconocida.    INSTRUCCIONES SOBRE EL ALEX HOSPITALARIA:    Regrese a la nica de emergencias si:  Usted comienza a sentir un dolor en el pecho o dificultad para respirar que antes no sentía.  Usted siente un dolor con pulsaciones en la parte superior del abdomen o en la parte inferior de la espalda que de repente se vuelve eve.  El dolor se localiza en la parte inferior derecha del abdomen y empeora cuando se mueve.  Usted tiene fiebre por encima de los 100.4 °F (38 °C) o escalofríos.  Usted tiene vómitos y no puede retener líquidos ni alimentos en el estómago.  El dolor no mejora o empeora en las próximas 8 a 12 horas.  Usted nota bill en richards vómito o heces, o éstas tienen un aspecto negruzco y alquitranado.  Richards piel o las partes john de pravin ojos se vuelven amarillentas.  Si usted es jazmine hardeep y presenta abundante sangrado vaginal que no es richards menstruación.  Comuníquese con richards médico si:  Usted siente dolor en la parte inferior de la espalda.  Usted es varón y tiene dolor en los testículos.  Siente dolor al orinar.  Usted tiene preguntas o inquietudes acerca de richards condición o cuidado.  Acuda en 24 horas a jazmine tony de seguimiento con richards médico o nini se le indique:Anote pravin preguntas para que se acuerde de hacerlas malena pravin visitas.    Medicamentos:  Los medicamentospueden ser administrados para calmar richards estómago y prevenir los vómitos o para disminuir el dolor. Pregunte cómo se debe roni los analgésicos de forma quiroz.  Yznaga pravin medicamentos nini se le haya indicado.Consulte con richards médico si usted mello que richards medicamento no le está ayudando o si presenta efectos secundarios. Infórmele si es alérgico a algún medicamento. Mantenga jazmine lista actualizada de los medicamentos, las vitaminas y los productos herbales que geronimo. Incluya los siguientes datos de los medicamentos: cantidad, frecuencia y motivo de administración. Traiga con usted la lista o los envases de las píldoras a pravin citas de seguimiento. Lleve la lista de los medicamentos con usted en wilver de jazmine emergencia.

## 2024-10-11 NOTE — ED ADULT TRIAGE NOTE - CHIEF COMPLAINT QUOTE
pt c/o lower abdominal pain radiating to back x1 week. +frequency and dysuria. Hx. HTN.DM2. Fibroids. pt denies chest pain, sob, n/v, diarrhea, fevers/chills. pt well appearing.

## 2024-10-11 NOTE — ED PROVIDER NOTE - CLINICAL SUMMARY MEDICAL DECISION MAKING FREE TEXT BOX
59-year-old female past medical history of uterine fibroids status post resection, diabetes presents for abdominal pain for 1 week.  Vitals stable, afebrile.  Exam nontoxic-appearing, abdomen diffusely mildly tender but nonperitoneal.  Will obtain labs, CT abdomen pelvis, analgesia.  Disposition pending. 59-year-old female past medical history of uterine fibroids status post resection, diabetes presents for abdominal pain for 1 week.  Vitals stable, afebrile.  Exam nontoxic-appearing, abdomen diffusely mildly tender but non-peritoneal.  Will obtain labs, CT abdomen pelvis, analgesia.  Disposition pending.

## 2024-10-12 VITALS
DIASTOLIC BLOOD PRESSURE: 82 MMHG | TEMPERATURE: 98 F | SYSTOLIC BLOOD PRESSURE: 118 MMHG | OXYGEN SATURATION: 98 % | RESPIRATION RATE: 15 BRPM | HEART RATE: 72 BPM

## 2024-10-12 PROCEDURE — 76830 TRANSVAGINAL US NON-OB: CPT | Mod: 26

## 2024-10-12 RX ORDER — MORPHINE SULFATE 30 MG/1
4 TABLET, FILM COATED, EXTENDED RELEASE ORAL ONCE
Refills: 0 | Status: COMPLETED | OUTPATIENT
Start: 2024-10-12 | End: 2024-10-12

## 2024-10-12 RX ORDER — KETOROLAC TROMETHAMINE 10 MG/1
30 TABLET, FILM COATED ORAL ONCE
Refills: 0 | Status: DISCONTINUED | OUTPATIENT
Start: 2024-10-12 | End: 2024-10-12

## 2024-10-12 RX ADMIN — KETOROLAC TROMETHAMINE 30 MILLIGRAM(S): 10 TABLET, FILM COATED ORAL at 02:10

## 2024-10-15 ENCOUNTER — OFFICE (OUTPATIENT)
Facility: LOCATION | Age: 59
Setting detail: OPHTHALMOLOGY
End: 2024-10-15
Payer: COMMERCIAL

## 2024-10-15 DIAGNOSIS — H43.391: ICD-10-CM

## 2024-10-15 DIAGNOSIS — E11.3291: ICD-10-CM

## 2024-10-15 DIAGNOSIS — E11.3292: ICD-10-CM

## 2024-10-15 DIAGNOSIS — H35.372: ICD-10-CM

## 2024-10-15 PROCEDURE — 92014 COMPRE OPH EXAM EST PT 1/>: CPT | Performed by: OPHTHALMOLOGY

## 2024-10-15 PROCEDURE — 92134 CPTRZ OPH DX IMG PST SGM RTA: CPT | Performed by: OPHTHALMOLOGY

## 2024-10-15 PROCEDURE — 92235 FLUORESCEIN ANGRPH MLTIFRAME: CPT | Performed by: OPHTHALMOLOGY

## 2024-10-15 PROCEDURE — 92201 OPSCPY EXTND RTA DRAW UNI/BI: CPT | Performed by: OPHTHALMOLOGY

## 2024-10-15 ASSESSMENT — VISUAL ACUITY
OS_BCVA: 20/20-2
OD_BCVA: 20/20

## 2024-12-26 NOTE — H&P ADULT - PROBLEM/PLAN-6
Addended by: STELLA GONZALEZ on: 12/26/2024 10:19 AM     Modules accepted: Orders    
DISPLAY PLAN FREE TEXT

## 2025-01-28 ENCOUNTER — EMERGENCY (EMERGENCY)
Facility: HOSPITAL | Age: 60
LOS: 1 days | Discharge: HOME | End: 2025-01-28
Attending: EMERGENCY MEDICINE | Admitting: EMERGENCY MEDICINE
Payer: COMMERCIAL

## 2025-01-28 VITALS
WEIGHT: 190.04 LBS | OXYGEN SATURATION: 100 % | DIASTOLIC BLOOD PRESSURE: 85 MMHG | SYSTOLIC BLOOD PRESSURE: 140 MMHG | HEART RATE: 78 BPM | HEIGHT: 63 IN | RESPIRATION RATE: 17 BRPM | TEMPERATURE: 98 F

## 2025-01-28 VITALS
RESPIRATION RATE: 17 BRPM | SYSTOLIC BLOOD PRESSURE: 121 MMHG | TEMPERATURE: 99 F | OXYGEN SATURATION: 100 % | DIASTOLIC BLOOD PRESSURE: 51 MMHG | HEART RATE: 79 BPM

## 2025-01-28 DIAGNOSIS — Z98.890 OTHER SPECIFIED POSTPROCEDURAL STATES: Chronic | ICD-10-CM

## 2025-01-28 DIAGNOSIS — Z87.2 PERSONAL HISTORY OF DISEASES OF THE SKIN AND SUBCUTANEOUS TISSUE: Chronic | ICD-10-CM

## 2025-01-28 DIAGNOSIS — Z98.51 TUBAL LIGATION STATUS: Chronic | ICD-10-CM

## 2025-01-28 LAB
ALBUMIN SERPL ELPH-MCNC: 4.3 G/DL — SIGNIFICANT CHANGE UP (ref 3.3–5)
ALP SERPL-CCNC: 94 U/L — SIGNIFICANT CHANGE UP (ref 40–120)
ALT FLD-CCNC: 30 U/L — SIGNIFICANT CHANGE UP (ref 4–33)
ANION GAP SERPL CALC-SCNC: 18 MMOL/L — HIGH (ref 7–14)
APPEARANCE UR: ABNORMAL
AST SERPL-CCNC: 33 U/L — HIGH (ref 4–32)
BASOPHILS # BLD AUTO: 0.03 K/UL — SIGNIFICANT CHANGE UP (ref 0–0.2)
BASOPHILS NFR BLD AUTO: 0.4 % — SIGNIFICANT CHANGE UP (ref 0–2)
BILIRUB SERPL-MCNC: 0.3 MG/DL — SIGNIFICANT CHANGE UP (ref 0.2–1.2)
BILIRUB UR-MCNC: NEGATIVE — SIGNIFICANT CHANGE UP
BUN SERPL-MCNC: 11 MG/DL — SIGNIFICANT CHANGE UP (ref 7–23)
CALCIUM SERPL-MCNC: 9.2 MG/DL — SIGNIFICANT CHANGE UP (ref 8.4–10.5)
CHLORIDE SERPL-SCNC: 100 MMOL/L — SIGNIFICANT CHANGE UP (ref 98–107)
CO2 SERPL-SCNC: 19 MMOL/L — LOW (ref 22–31)
COLOR SPEC: YELLOW — SIGNIFICANT CHANGE UP
CREAT SERPL-MCNC: 0.59 MG/DL — SIGNIFICANT CHANGE UP (ref 0.5–1.3)
DIFF PNL FLD: NEGATIVE — SIGNIFICANT CHANGE UP
EGFR: 104 ML/MIN/1.73M2 — SIGNIFICANT CHANGE UP
EOSINOPHIL # BLD AUTO: 0.02 K/UL — SIGNIFICANT CHANGE UP (ref 0–0.5)
EOSINOPHIL NFR BLD AUTO: 0.2 % — SIGNIFICANT CHANGE UP (ref 0–6)
GLUCOSE SERPL-MCNC: 211 MG/DL — HIGH (ref 70–99)
GLUCOSE UR QL: NEGATIVE MG/DL — SIGNIFICANT CHANGE UP
HCG SERPL-ACNC: 1.5 MIU/ML — SIGNIFICANT CHANGE UP
HCT VFR BLD CALC: 42.4 % — SIGNIFICANT CHANGE UP (ref 34.5–45)
HGB BLD-MCNC: 13.2 G/DL — SIGNIFICANT CHANGE UP (ref 11.5–15.5)
IANC: 5.43 K/UL — SIGNIFICANT CHANGE UP (ref 1.8–7.4)
IMM GRANULOCYTES NFR BLD AUTO: 0.4 % — SIGNIFICANT CHANGE UP (ref 0–0.9)
KETONES UR-MCNC: NEGATIVE MG/DL — SIGNIFICANT CHANGE UP
LEUKOCYTE ESTERASE UR-ACNC: ABNORMAL
LIDOCAIN IGE QN: 23 U/L — SIGNIFICANT CHANGE UP (ref 7–60)
LYMPHOCYTES # BLD AUTO: 2.09 K/UL — SIGNIFICANT CHANGE UP (ref 1–3.3)
LYMPHOCYTES # BLD AUTO: 26 % — SIGNIFICANT CHANGE UP (ref 13–44)
MAGNESIUM SERPL-MCNC: 2 MG/DL — SIGNIFICANT CHANGE UP (ref 1.6–2.6)
MCHC RBC-ENTMCNC: 26.6 PG — LOW (ref 27–34)
MCHC RBC-ENTMCNC: 31.1 G/DL — LOW (ref 32–36)
MCV RBC AUTO: 85.3 FL — SIGNIFICANT CHANGE UP (ref 80–100)
MONOCYTES # BLD AUTO: 0.44 K/UL — SIGNIFICANT CHANGE UP (ref 0–0.9)
MONOCYTES NFR BLD AUTO: 5.5 % — SIGNIFICANT CHANGE UP (ref 2–14)
NEUTROPHILS # BLD AUTO: 5.43 K/UL — SIGNIFICANT CHANGE UP (ref 1.8–7.4)
NEUTROPHILS NFR BLD AUTO: 67.5 % — SIGNIFICANT CHANGE UP (ref 43–77)
NITRITE UR-MCNC: NEGATIVE — SIGNIFICANT CHANGE UP
NRBC # BLD: 0 /100 WBCS — SIGNIFICANT CHANGE UP (ref 0–0)
NRBC # FLD: 0 K/UL — SIGNIFICANT CHANGE UP (ref 0–0)
PH UR: 8.5 (ref 5–8)
PHOSPHATE SERPL-MCNC: 2.7 MG/DL — SIGNIFICANT CHANGE UP (ref 2.5–4.5)
PLATELET # BLD AUTO: 362 K/UL — SIGNIFICANT CHANGE UP (ref 150–400)
POTASSIUM SERPL-MCNC: 4.9 MMOL/L — SIGNIFICANT CHANGE UP (ref 3.5–5.3)
POTASSIUM SERPL-SCNC: 4.9 MMOL/L — SIGNIFICANT CHANGE UP (ref 3.5–5.3)
PROT SERPL-MCNC: 7.9 G/DL — SIGNIFICANT CHANGE UP (ref 6–8.3)
PROT UR-MCNC: 100 MG/DL
RBC # BLD: 4.97 M/UL — SIGNIFICANT CHANGE UP (ref 3.8–5.2)
RBC # FLD: 13.2 % — SIGNIFICANT CHANGE UP (ref 10.3–14.5)
SODIUM SERPL-SCNC: 137 MMOL/L — SIGNIFICANT CHANGE UP (ref 135–145)
SP GR SPEC: 1.02 — SIGNIFICANT CHANGE UP (ref 1–1.03)
UROBILINOGEN FLD QL: 0.2 MG/DL — SIGNIFICANT CHANGE UP (ref 0.2–1)
WBC # BLD: 8.04 K/UL — SIGNIFICANT CHANGE UP (ref 3.8–10.5)
WBC # FLD AUTO: 8.04 K/UL — SIGNIFICANT CHANGE UP (ref 3.8–10.5)

## 2025-01-28 PROCEDURE — 74177 CT ABD & PELVIS W/CONTRAST: CPT | Mod: 26

## 2025-01-28 PROCEDURE — 99285 EMERGENCY DEPT VISIT HI MDM: CPT

## 2025-01-28 PROCEDURE — 76770 US EXAM ABDO BACK WALL COMP: CPT | Mod: 26

## 2025-01-28 RX ORDER — KETOROLAC TROMETHAMINE 30 MG/ML
30 INJECTION INTRAMUSCULAR; INTRAVENOUS ONCE
Refills: 0 | Status: DISCONTINUED | OUTPATIENT
Start: 2025-01-28 | End: 2025-01-28

## 2025-01-28 RX ORDER — LORAZEPAM 1 MG/1
1 TABLET ORAL ONCE
Refills: 0 | Status: DISCONTINUED | OUTPATIENT
Start: 2025-01-28 | End: 2025-01-28

## 2025-01-28 RX ORDER — ONDANSETRON 4 MG/1
4 TABLET ORAL ONCE
Refills: 0 | Status: COMPLETED | OUTPATIENT
Start: 2025-01-28 | End: 2025-01-28

## 2025-01-28 RX ORDER — MORPHINE SULFATE 15 MG
4 TABLET, EXTENDED RELEASE ORAL ONCE
Refills: 0 | Status: DISCONTINUED | OUTPATIENT
Start: 2025-01-28 | End: 2025-01-28

## 2025-01-28 RX ORDER — CEFPODOXIME PROXETIL 100 MG/5ML
1 GRANULE, FOR SUSPENSION ORAL
Qty: 20 | Refills: 0
Start: 2025-01-28 | End: 2025-02-06

## 2025-01-28 RX ORDER — SODIUM CHLORIDE 9 MG/ML
1000 INJECTION, SOLUTION INTRAMUSCULAR; INTRAVENOUS; SUBCUTANEOUS ONCE
Refills: 0 | Status: COMPLETED | OUTPATIENT
Start: 2025-01-28 | End: 2025-01-28

## 2025-01-28 RX ADMIN — SODIUM CHLORIDE 1000 MILLILITER(S): 9 INJECTION, SOLUTION INTRAMUSCULAR; INTRAVENOUS; SUBCUTANEOUS at 09:44

## 2025-01-28 RX ADMIN — KETOROLAC TROMETHAMINE 30 MILLIGRAM(S): 30 INJECTION INTRAMUSCULAR; INTRAVENOUS at 09:44

## 2025-01-28 RX ADMIN — LORAZEPAM 1 MILLIGRAM(S): 1 TABLET ORAL at 10:06

## 2025-01-28 RX ADMIN — ONDANSETRON 4 MILLIGRAM(S): 4 TABLET ORAL at 10:06

## 2025-01-28 NOTE — ED PROVIDER NOTE - CLINICAL SUMMARY MEDICAL DECISION MAKING FREE TEXT BOX
60 yo F w/ PMHx of DM, HTN, HLD, asthma, uterine fibroids s/p tubal ligation (2014) and myomectomy (4/2009) presents for sudden onset umbilical pain radiating to right flank starting ~7:30 AM patient was on her way to work and had to turn back around due to the amount of pain she was in.  She also endorses nausea/vomiting.  Initial evaluation, patient is in severe pain screaming and unable to provide any information verbally.   at bedside provides collateral information stating that patient had a similar episode in the past and was diagnosed with a kidney stone.   denies that patient was complaining of any pneumaturia/dysuria/trauma.    On chart review, patient presented on 10/11/2024 for lower abdominal pain radiating to lumbar back with intermittent/sharp/aching pain with no dysuria/hematuria and LLQ TTP.  CT A/P was unremarkable with diverticulosis (no diverticulitis), nonobstructing bilateral intrarenal calculi (up to 5 mm) and fibroid uterus. TVUS showed no evidence of acute ovarian torsion on left ovary with right ovary not visualized.     Vital signs remarkable for /85.  Physical exam is remarkable for right CVA TTP and diffuse abdominal TTP with guarding in acute distress.  HPI and physical exam are limited as patient is not answering questions/following commands as she is in severe pain. Concern for kidney stone versus uterine fibroid pain versus ovarian torsion based on symptoms/signs.  Plan for reevaluation after pain medication and fluids and Zofran.  Will order Toradol, Zofran, fluids, labs, and CT abdomen/pelvis.  Plan also performing pelvic exam to distinguish between intrapelvic versus intra-abdominal pathology when pain is better controlled. 60 yo F w/ PMHx of DM, HTN, HLD, asthma, uterine fibroids s/p tubal ligation (2014) and myomectomy (4/2009) presents for sudden onset umbilical pain radiating to right flank starting ~7:30 AM patient was on her way to work and had to turn back around due to the amount of pain she was in.  She also endorses nausea/vomiting.  Initial evaluation, patient is in severe pain screaming and unable to provide any information verbally.   at bedside provides collateral information stating that patient had a similar episode in the past and was diagnosed with a kidney stone.   denies that patient was complaining of any pneumaturia/dysuria/trauma.    On chart review, patient presented on 10/11/2024 for lower abdominal pain radiating to lumbar back with intermittent/sharp/aching pain with no dysuria/hematuria and LLQ TTP.  CT A/P was unremarkable with diverticulosis (no diverticulitis), nonobstructing bilateral intrarenal calculi (up to 5 mm) and fibroid uterus. TVUS showed no evidence of acute ovarian torsion on left ovary with right ovary not visualized.     Vital signs remarkable for /85.  Physical exam is remarkable for right CVA TTP and diffuse abdominal TTP with guarding in acute distress.  HPI and physical exam are limited as patient is not answering questions/following commands as she is in severe pain. Concern for kidney stone versus uterine fibroid pain versus ovarian torsion based on symptoms/signs.  Plan for reevaluation after pain medication and fluids and Zofran.  Will order Toradol, Zofran, fluids, labs, and CT abdomen/pelvis.  Plan also performing pelvic exam to distinguish between intrapelvic versus intra-abdominal pathology when pain is better controlled.    ronald:Patient comes in with severe abdominal pain that started while she was in the shower this morning.  She is a 59-year-old woman it started in the right flank.  She notes that she had dysuria last night before she went to sleep.  She does have a history of kidney stones.  She also has a history of significant fibroids.  She was seen in December for severe abdominal pain and had a CAT scan done after being medicated given her anxiety disorder and no acute findings were found.   patient without fever has vomiting no diarrhea no chest pain no shortness of breath. Patient does have severe anxiety disorder and feels very anxious. Surgeries include Myomectomy and tubal ligation".  She retains her gallbladder and her appendix    Vital signs include blood pressure 140/85, respiratory rate 17 heart rate 78 temp 98 afebrile O2 sat is 100  Patient came in initially vomiting and moaning in pain  Pt alert and can phonate well  h at/nc  perrl, conj clear, sclera anicteric,  neck supple  cor rrr pos s1s2  lungs clear to asno wheeze  abd soft no r/g/ Mild tenderness suprapubic question of tenderness right flank no Craig sign  ext no edema no deformities  neueo awake, lucid normal gait moves all extremities with strength  psych normal affect  vs reasonable    Patient with right-sided flank pain and suprapubic pain.  Need to check the urine.  Patient last time needed to be medicated to get CAT scan and found nothing that was actionable.  It is likely that this pain is most likely either urologic in origin either a stone or infection or from her fibroids.  Her last period was 8 years ago.  It is possible she has a degenerating fibroid although it would be on the outside of the most likely options

## 2025-01-28 NOTE — ED PROVIDER NOTE - NSFOLLOWUPCLINICS_GEN_ALL_ED_FT
[facility]  [address] [place] [address]  Phone: [phone]  Fax:   Follow Up Time: 4-6 Days

## 2025-01-28 NOTE — ED ADULT NURSE REASSESSMENT NOTE - NS ED NURSE REASSESS COMMENT FT1
received report from [facility] [place], received back from US. respirations even and unlabored. pt denies medical complaints at this time. pending CT. no new orders at this time. plan of care continued.

## 2025-01-28 NOTE — ED PROVIDER NOTE - PHYSICAL EXAMINATION
GENERAL: severe acute distress, endomorphic body habitus  HEENT: atraumatic, normocephalic, vision grossly intact, EOMI, no conjunctivitis or discharge, hearing grossly intact, no nasal discharge or epistaxis, clear pharynx  CV: regular rate, normal rhythm, normal S1/S2, no murmurs/rubs, no cyanosis  PULM: normal work of breathing, normal O2 saturation on RA, clear breath sounds in b/l upper/lower lung fields, no crackles/rales/rhonchi/wheezing  GI: diffuse abdominal TTP w/ gaurding  : R CVA tenderness  NEURO: A&Ox4, follows commands, normal speech, no focal motor or sensory deficits  MSK: no joint tenderness/swelling/erythema, ranging all extremities with no appreciable loss of ROM  EXT: no peripheral edema, no calf tenderness, no redness or swelling  SKIN: warm, dry, and intact, no rashes  PSYCH: agitated

## 2025-01-28 NOTE — ED PROVIDER NOTE - NSFOLLOWUPINSTRUCTIONS_ED_ALL_ED_FT
Reason for ED Visit:  - Severe abdominal pain rating to right side    Findings/Diagnosis:  - 4 mm obstructing kidney stone in right ureterovesicular junction (UVJ) with mild hydroureteronephrosis (backing up of urine into kidney)  - Contaminated urine sample showing occasional bacteria.  Will treat for urinary tract infection considering that she had burning sensation with urination.  Please follow-up with urine cultures to see if you have to continue taking antibiotics.    Please return to the ED immediately for any new, worsening, or concerning symptoms including, but not limited to:   - Severe abdominal pain   - Fevers    Please take the following medications at home:   - Cefpodoxime 200 mg every 12 hours for 10 days   - Acetaminophen (Tylenol) 500 to 1000 mg every 6-8 hours as needed for pain    Please follow up with urologist regarding this ED visit.    Thank you for choosing us for your care.

## 2025-01-28 NOTE — ED PROVIDER NOTE - PATIENT PORTAL LINK FT
You can access the FollowMyHealth Patient Portal offered by [facility] by registering at the following website: [email or web address]. By joining [facility]’s FollowMyHealth portal, you will also be able to view your health information using other applications (apps) compatible with our system.

## 2025-01-28 NOTE — ED PROVIDER NOTE - ATTENDING CONTRIBUTION TO CARE
ronald:Patient comes in with severe abdominal pain that started while she was in the shower this morning.  She is a 59-year-old woman it started in the right flank.  She notes that she had dysuria last night before she went to sleep.  She does have a history of kidney stones.  She also has a history of significant fibroids.  She was seen in December for severe abdominal pain and had a CAT scan done after being medicated given her anxiety disorder and no acute findings were found.   patient without fever has vomiting no diarrhea no chest pain no shortness of breath. Patient does have severe anxiety disorder and feels very anxious. Surgeries include Myomectomy and tubal ligation".  She retains her gallbladder and her appendix    Vital signs include blood pressure 140/85, respiratory rate 17 heart rate 78 temp 98 afebrile O2 sat is 100  Patient came in initially vomiting and moaning in pain  Pt alert and can phonate well  h at/nc  perrl, conj clear, sclera anicteric,  neck supple  cor rrr pos s1s2  lungs clear to asno wheeze  abd soft no r/g/ Mild tenderness suprapubic question of tenderness right flank no Craig sign  ext no edema no deformities  neueo awake, lucid normal gait moves all extremities with strength  psych normal affect  vs reasonable    Patient with right-sided flank pain and suprapubic pain.  Need to check the urine.  Patient last time needed to be medicated to get CAT scan and found nothing that was actionable.  It is likely that this pain is most likely either urologic in origin either a stone or infection or from her fibroids.  Her last period was 8 years ago.  It is possible she has a degenerating fibroid although it would be on the outside of the most likely options    I performed a history and physical exam of the patient and discussed their management with the resident and /or advanced care provider. I personally made/approved the management plan and take responsibility for the patient management. I reviewed the resident and /or ACP's note and agree with the documented findings and plan of care. My medical decison making and observations are found above.

## 2025-01-28 NOTE — ED ADULT TRIAGE NOTE - CHIEF COMPLAINT QUOTE
Pt arrives in wheelchair crying in pain. Pt states that she was experienced sudden onset umbilical abdominal pain with an episode of nausea and vomiting. Pt appears to be crying in triage. States pain radiates to the right lower quadrant. Hx T2DM, asthma Finger stick 176.

## 2025-01-28 NOTE — ED PROVIDER NOTE - NSPTACCESSSVCSAPPT_ED_ALL_ED
Telephone call to patient and situation discussed. Patient was in the emergency room yesterday for evaluation of her leg swelling. Patient's potassium was 3.4.   I have recommended to the patient that she increase her Lasix and take 40 mg orally daily in [facility] (immediate)...

## 2025-01-28 NOTE — ED PROVIDER NOTE - PROGRESS NOTE DETAILS
[facility] PGY3: Labs remarkable for occasional bacteria in urine, trace leukocyte esterases, and epithelial cells -concern for contaminated urine specimen.  CT scans remarkable for 4 mm obstructing calculus in right UVJ with mild hydroureteronephrosis.    Patient does report burning sensation with urination.  Will treat patient for urinary tract infection and asked patient follow-up with culture results and follow-up with a urologist.

## 2025-01-28 NOTE — ED ADULT NURSE NOTE - OBJECTIVE STATEMENT
58 yo F AAOx4 received to rm 24 p/w abd pain- pt writhing in pain, screaming, unable to endorse details of further RN assessment/medical hx, #20 to L hand, medicated as ordered, pt pending CT, update given to pt and  @ bedside

## 2025-01-28 NOTE — ED PROVIDER NOTE - NS ED ATTENDING STATEMENT MOD
Attending with

## 2025-01-28 NOTE — ED ADULT TRIAGE NOTE - TEMPERATURE IN CELSIUS (DEGREES C)
36.7 Elidel Counseling: Patient may experience a mild burning sensation during topical application. Elidel is not approved in children less than 2 years of age. There have been case reports of hematologic and skin malignancies in patients using topical calcineurin inhibitors although causality is questionable.

## 2025-02-10 ENCOUNTER — APPOINTMENT (OUTPATIENT)
Dept: UROLOGY | Facility: CLINIC | Age: 60
End: 2025-02-10
Payer: COMMERCIAL

## 2025-02-10 ENCOUNTER — LABORATORY RESULT (OUTPATIENT)
Age: 60
End: 2025-02-10

## 2025-02-10 VITALS
WEIGHT: 173 LBS | HEIGHT: 63 IN | BODY MASS INDEX: 30.65 KG/M2 | RESPIRATION RATE: 16 BRPM | DIASTOLIC BLOOD PRESSURE: 78 MMHG | OXYGEN SATURATION: 98 % | SYSTOLIC BLOOD PRESSURE: 119 MMHG | HEART RATE: 83 BPM

## 2025-02-10 DIAGNOSIS — N20.1 CALCULUS OF URETER: ICD-10-CM

## 2025-02-10 DIAGNOSIS — N20.0 CALCULUS OF KIDNEY: ICD-10-CM

## 2025-02-10 PROCEDURE — G2211 COMPLEX E/M VISIT ADD ON: CPT | Mod: NC

## 2025-02-10 PROCEDURE — 99204 OFFICE O/P NEW MOD 45 MIN: CPT

## 2025-02-10 RX ORDER — TAMSULOSIN HYDROCHLORIDE 0.4 MG/1
0.4 CAPSULE ORAL
Qty: 30 | Refills: 1 | Status: ACTIVE | COMMUNITY
Start: 2025-02-10 | End: 1900-01-01

## 2025-02-11 LAB
APPEARANCE: ABNORMAL
BILIRUBIN URINE: NEGATIVE
BLOOD URINE: NEGATIVE
COLOR: YELLOW
GLUCOSE QUALITATIVE U: NEGATIVE MG/DL
KETONES URINE: NEGATIVE MG/DL
LEUKOCYTE ESTERASE URINE: NEGATIVE
NITRITE URINE: NEGATIVE
PH URINE: 5.5
PROTEIN URINE: NEGATIVE MG/DL
SPECIFIC GRAVITY URINE: 1.02
UROBILINOGEN URINE: 0.2 MG/DL

## 2025-03-10 ENCOUNTER — APPOINTMENT (OUTPATIENT)
Dept: UROLOGY | Facility: CLINIC | Age: 60
End: 2025-03-10
Payer: COMMERCIAL

## 2025-03-10 DIAGNOSIS — N20.1 CALCULUS OF URETER: ICD-10-CM

## 2025-03-10 DIAGNOSIS — N20.0 CALCULUS OF KIDNEY: ICD-10-CM

## 2025-03-10 PROCEDURE — G2211 COMPLEX E/M VISIT ADD ON: CPT | Mod: NC

## 2025-03-10 PROCEDURE — 99213 OFFICE O/P EST LOW 20 MIN: CPT

## 2025-03-13 ENCOUNTER — RX RENEWAL (OUTPATIENT)
Age: 60
End: 2025-03-13

## 2025-04-01 ENCOUNTER — OFFICE (OUTPATIENT)
Facility: LOCATION | Age: 60
Setting detail: OPHTHALMOLOGY
End: 2025-04-01
Payer: COMMERCIAL

## 2025-04-01 DIAGNOSIS — E11.3291: ICD-10-CM

## 2025-04-01 DIAGNOSIS — E11.3292: ICD-10-CM

## 2025-04-01 DIAGNOSIS — H43.391: ICD-10-CM

## 2025-04-01 DIAGNOSIS — H35.372: ICD-10-CM

## 2025-04-01 PROCEDURE — 92235 FLUORESCEIN ANGRPH MLTIFRAME: CPT | Performed by: OPHTHALMOLOGY

## 2025-04-01 PROCEDURE — 92134 CPTRZ OPH DX IMG PST SGM RTA: CPT | Performed by: OPHTHALMOLOGY

## 2025-04-01 PROCEDURE — 92012 INTRM OPH EXAM EST PATIENT: CPT | Performed by: OPHTHALMOLOGY

## 2025-04-01 ASSESSMENT — VISUAL ACUITY
OS_BCVA: 20/30-1
OD_BCVA: 20/20+2

## 2025-05-02 NOTE — ED ADULT TRIAGE NOTE - TEMPERATURE IN FAHRENHEIT (DEGREES F)
General Instructions for Common Concerns in Pregnancy    The following instructions are recommended by the OB/GYNE Department at [facility]. If symptoms persist for more than 2-3 days, please contact the office for further assistance at [phone].    Symptoms of a cold: sneezing, coughing, headache, runny nose, watery eyes, slightly elevated temperature (under 100). A temperature over 100, with a productive cough (yellow-green mucus), needs evaluation by your primary care provider. There is no cure for the cold/flu... it takes time!  Rest Chicken soup Increase fluid intake   Cepacol, Sucrets lozenges, or Halls (no Zinc) Robitussin cough syrup, any kind Sudafed       Heartburn or Upset Stomach: *Liquid antacids are frequently more effective than tablets. Call the office if no relief. Avoid spicy food or any food that may cause symptoms.  Maalox or Maalox Plus Pepcid Tums   Mylanta ll, sodium free Zantac 75mg twice daily Tagamet   Milk of Magnesia          Headache or Mild aches and pains associated with colds or flu: Call office if persistent or severe.   Rest Tylenol or Extra Strength Tylenol   (Acetaminophen)                                                           Diarrhea(simple): Call office if persists more than 2 days or if accompanied by a fever.  Contact the office if you have been in a foreign country, if other family members have been ill, or if there is a possibility of contact with contaminated food/water prior to onset.  Minimum residue diet: No milk, fruits or vegetables other than peeled, cooked potatoes. Avoid spicy or greasy foods. You can have liquids, bread, noodles, rice, plain pasta, and tender cooked meat. Fish and poultry may be eaten as desired.  Kaopectate  Immodium A-D Lomotil       Constipation (with no nausea or vomiting): Increase fluids, fiber, and activity. Call with severe straining.  Milk of Magnesia Colace 50 mg 1-2 caps daily Prune Juice   Metamucil/Benefiber          Hemorrhoids: Call if severe bleeding or pain  Preparation H Anusol Suppositories Tucks           OVER THE COUNTER MEDICATION USE IN PREGNANCY    NO ASPIRIN, IBUPROFIN (ADVIL, MOTRIN, OR ALEVE), OR ZINC    The following over the counter (OTC) medications may be safely taken by pregnant women following all the directions on the package for adult usage.    Cold, flu, and allergy relief: Nasal congestion, cough, sneezing, runny nose, minor aches and pains, scratchy/sore throat  Benadryl Class B   Benadryl Cold or Allergy Sinus Class C   Claritin, Claritin D Class B   Chlor-trimeton Allergy Class B   Chlor-trimeton Allergy-sinus Class C   Comtrex Allergy Sinus/Multi Symptom Cough Class C   Contact (Severe cold and flu, Decongestant, Antihistamine, Day and Night Cold/Flu) Class C   Ocean Nasal Spray/Mist Class B   Robitussin CF, DM, PE/Robitussin Cough and Cold/Robitussin Cough, Cold, and Flu Class C   Sinutab Sinus Allergy Class C   Sudafed/Tavist- D Class C   Theraflu Cold and Cough/Flu and Cold Class C   Tylenol Regular and Extra strength Class B   Tylenol Cold, Multi-symptom, Cold Nighttime Class C   Vicks Formula 44, 44D, and 44E Class C     Gas:  Gas X, Mylicon Drops, Mylanta Gas Class C     Motion Sickness:  Dramamine/Dramamine ll Class B     Yeast Symptoms:  Monistat 7 Class B     Insomnia:  Sominex Class B       Morning sickness prevention and remedies:  Eat a piece of toast or a few crackers before you get out of bed in the morning ( place them by your bed the night before), or when you feel nauseated.   Get out of bed slowly and avoid sudden movements.   Eat smaller, more frequent meals during the day so your stomach does not remain empty for very long.   Eat high protein foods: eggs, cheese, nuts, meats,  ect, as well as fruits/fruit juices. These foods help prevent low levels of sugar in your blood, which can also cause nausea.   Sip spearmint, peppermint, ginger, or raspberry leaf tea. Ginger ale, 7 up  , or Sprite   Vitamin B6 25 mg/day to start; if not helpful, increase by 25 mg/day until taking 100 mg/day maximum.   Medication and Mother's Milk, 18th ED, 2019 and Physician's Desk Reference, 71st Ed, 2017    Medications Safe in Pregnancy  The following over-the-counter medications may be taken safely after 12 weeks gestation by any patient who is pregnant.  Please follow the instructions on the package for adult dosage.  If you experience any symptoms prior to 12 weeks, please call the office at [phone].      Colds/Congestion: Flonase, Saline Nasal Spray, Neti Pot, Plain Robitussin, Robitussin DM, Mucinex DM, Vicks 44 E, Vicks Vapor rub, Cough drops without Zinc or Sudafed.   Contact your doctor if you have a persistent fever over 100.4, shortness of breath, coughing up green mucus, or a sore throat that persists from more than 3 days    Diarrhea: Imodium, Maalox Anti-Diarrheal or Kaopectate  Contact the office if you have diarrhea for more than 3 days.    Allergies: Benadryl, Claritin or Zyrtec    Hemorrhoids: Tucks pads, Preparation H, Annusol, Sitz bath or Witch hazel  Eat a high fiber diet and drink plenty of fluids.    Yeast: Monistat 3 or 7  Call if your symptoms do not improve, or if you experience vaginal bleeding, or a watery discharge.    Constipation: Metamucil, Colace, fiber supplement, Milk of Magnesia or Dulcolax  Drink plenty of fluids, eat high-fiber foods and take the above laxatives sporadically.     Headache or Mild aches and pain: Extra Strength Tylenol     Gas: Gas X, Gelusil, Papaya Tablets, Maalox, Mylicon or Mylanta Gas    Heartburn: Tums, Mylanta, Pepcid AC or Maalox    Sore throat: Alcohol free Chloraseptic spray or Lozenges     Nausea and Vomiting: ½ tablet Unisom plus 100mg of Vitamin B6 at bedtime (may increase B6 up to 200mg per day), Tomasa root tea or raspberry leaf tea    Insomnia: Tylenol PM, Vitamin B6 50mg, warm bath or milk, Unisom, Nytol or Sominex.     We have listed a  few medications for common symptoms seen in pregnancy.  Please contact the office if you are unsure about any over the counter medications that are not listed above.      Foods to Avoid During Pregnancy  Deli Meats- You may eat deli meats from the deli.  If you eat deli meats in the package, it may be contaminated with Listeria. Heat all deli meats in a package to 165 degrees Fahrenheit before eating, even if the label states the meat is “pre-cooked”.    Soft Cheeses and Unpasteurized Products - You may eat soft cheeses that are pasteurized.  Please avoid all soft cheeses, milk or juice that is unpasteurized.  Fish- avoid fish that contains a high level of mercury. These include but are not limited to; [place], Orange Roughy, Tile Fish, Shark, Swordfish, and Hakeem Mackerel. Please see chart below for good fish choices in pregnancy. Also avoid any raw, uncooked shellfish such as oysters, clams, or sushi.  Make sure to cook all meats; including ground beef, pork, and poultry to their desired temperatures before consuming. This reduces the chance of a food borne illness.  Caffeine- limit to 200 mg or an 8oz cup daily or less.   Alcohol- no amount of alcohol is determined to be safe in pregnancy. Do not drink any alcoholic beverages while pregnant.   Nutrition in Pregnancy                The ADA and ACOG define patients at increased risk of type 2 diabetes based on: body mass index (BMI) >=25 kg/m2 (>=23 kg/m2 in  Americans) plus one or more of the following [2,28]:     GDM in a previous pregnancy.     Glycated hemoglobin >=5.7 percent (39 mmol/mol), impaired glucose tolerance, or impaired fasting glucose on previous testing.     First-degree relative with diabetes.     High-risk race/ethnicity (eg, , , ,  American, ).     History of cardiovascular disease.     Hypertension (>=140/90 mmHg) or on therapy for hypertension.     High-density lipoprotein  cholesterol level <35 mg/dL (0.90 mmol/L) and/or a triglyceride level >250 mg/dL (2.82 mmol/L).     Polycystic ovary syndrome (PCOS).     Physical inactivity.     Other clinical condition associated with insulin resistance (eg, severe obesity, acanthosis nigricans).           Candidates     Low-dose aspirin 81 mg: Take 2 tablets by mouth daily starting at 12 weeks until 36 weeks        Selecting high risk women for prophylaxis -- The greatest benefit appears to be in women at moderate to high risk of developing the disease [10,13-20], in whom a 62 percent reduction of  preeclampsia occurred in the ASPRE trial [21]. We recommend low-dose aspirin prophylaxis for women at high risk for preeclampsia. There is no consensus on the exact criteria that confer high risk. It is reasonable to use the USPSTF high-risk criteria, which are also endorsed by the American College of Obstetricians and Gynecologists (ACOG) [22,23]. The incidence of preeclampsia is estimated to be at least 8 percent for pregnant women with any one of these high risk factors:     ?Previous pregnancy with preeclampsia, especially early onset and with an adverse outcome.     ?Type 1 or 2 diabetes mellitus.     ?Chronic hypertension.     ?Multifetal gestation.     ?Kidney disease.     ?Autoimmune disease with potential vascular complications (antiphospholipid syndrome, systemic lupus erythematosus).        We generally follow the USPSTF criteria and recommend low-dose aspirin for preeclampsia prevention to patients with two or more of the following moderate risk factors [22]:     ?Nulliparity.     ?Obesity (body mass index >30 kg/m2).     ?Family history of preeclampsia in mother or sister.     ?Age >=35 years.     ?Sociodemographic characteristics (Identify as black, lower income level [recognizing that these are not biological factors]).     ?Personal risk factors (eg, previous pregnancy with low birth weight or small for gestational age infant,  previous adverse pregnancy outcome [eg, stillbirth], interval >10 years between pregnancies).      [facility]- Prenatal Testing    The following information is designed to help you understand some of the optional tests that are available to you to screen for problems in your pregnancy.  Before considering any of these tests, you will need to consider the following questions:    [1] What would you do if an abnormality were detected?  If the answer is nothing, then you may decide to decline all testing.  [2] Would you be willing to undergo testing which might increase your risk of miscarriage, such as an amniocentesis or CVS (see below)?  [3] If you have the initial testing, and it indicates that there might be a problem, and you subsequently decide not to do invasive testing such as an amniocentesis, would you worry excessively through the remainder of the pregnancy?    The following tests are available to screen for problems in your pregnancy:      [1]  First Trimester Screening (also called Nuchal Thickness, Nuchal Translucency or NT)- This is an abdominal ultrasound done between 10 and 14 weeks by a perinatology specialist (Maternal Fetal Medicine, MFM) which measures the thickness of the skin behind your baby's neck.  Increased thickness of the skin in this area has been linked to an increased risk of genetic abnormalities like Down Syndrome.  A second visit may be required if the baby is not in the correct position.  The ultrasound results are combined with a finger stick blood test to increase the sensitivity of the test.  You will need to schedule an appointment with the Maternal Fetal Medicine office.  Address and phone number below:  Please verify insurance coverage:  CPT code: 46741 (maravilla) or 51933 (twins)  Diagnosis: Genetic Screening- Z36.8A  Maternal Fetal Medicine  Dr. Rodriguez, Dr. Negro, Dr. Sharp, and Dr. De La O  [address]  [place] [address]  Phone [phone]  Fax  [phone]    [2] Cell-Free DNA testing (NIPT)- This is a blood test done any time after 10-11 weeks which screens for genetic abnormalities like Down Syndrome.  It is greater than 98% sensitive but requires an amniocentesis for confirmation if abnormal.  It can also tell you the sex of the baby.  CPT code: 42478  Diagnosis code: Genetic screening- Z36.8A  Testing options:   Ab- preferred for I[facility] patients or all patients.  Please call [phone] or go to [employer]/empower to review billing, prior authorizations or referrals  [id number]- Optional for all insurance plans except [facility].  Please call [employer]efactive at [phone] or go to [employer]/[email or web address]/ to review billing, prior authorizations, or referrals      [3]  Quad Screen (also called AFP-plus or Tetra Screen)-  This is a simple blood test which screens for both genetic abnormalities like Down Syndrome and neural tube defects (NTDs), such as spina bifida (an abnormal opening in the spine which can cause paralysis).  It is usually performed around 16-20 weeks.  If the Quad screen comes back abnormal, it does not mean that your baby has an abnormality.  It only means that there is an increased risk of an abnormality.  Additional testing such as a Level II Ultrasound or Amniocentesis may be recommended (see below).  This test is less accurate at picking up genetic abnormalities than the cell-free DNA test.  If you have test #1 or 2, then usually only the AFP part of the Quad screen would be performed to screen for NTD's (see below).  Please verify insurance coverage:  CPT code: 78806  Diagnosis code: Genetic screening- Z36.8A    [4]  Alpha Fetoprotein (AFP, MSAFP)- This is a simple blood test that screens for neural tube defects such as spina bifida (an abnormal opening in the spine).  It is usually performed around 16-20 weeks.  Additional testing such as a Level II ultrasound may be recommended for an abnormal test  result.  Please verify insurance coverage:  CPT code: 71359 Diagnosis code: Genetic Screening- Z36.8A    ---------------------------------------------------------------------------------------------------------------    Carrier screening:     [5] Cystic Fibrosis/SMA Carrier Screening- Cystic Fibrosis is a genetic disease which causes lung problems in the infant.  SMA (spinal muscular atrophy) is a genetic disease that affects the nerve cells of the spinal cord.  These genetic defects would need to be passed from both parents to the child.  A blood test is performed on the mother, and if her test is positive, then the father should also be tested. These tests can be done at any time in the pregnancy, usually in the first trimester with your initial OB labs.  All babies are screened for cystic fibrosis after birth.  Please verify insurance coverage:  Cystic Fibrosis CPT Code: 56671 Diagnosis code: Cystic Fibrosis screening- Z13.228  SMA CPT Code: 60912 Diagnosis code: Genetic Screening- Z36.8A or Testing for Genetic Disease Carrier- Z13.71    [6] Hemoglobinopathy carrier screening: The hemoglobinopathies are heterogeneous genetic disorders of hemoglobin (Hb) typically inherited in an autosomal recessive pattern. The clinical presentation ranges from asymptomatic in carriers to mild to severe disease in homozygotes and compound heterozygotes. At the severe end of the spectrum, hemoglobinopathies impact quality of life, require life-long care (typically with regular blood transfusions), and can shorten life expectancy. In the [place], the American College of Obstetricians and Gynecologists (ACOG) recommends carrier screening and counseling for genetic conditions, ideally before pregnancy. Firstline for hemoglobinopathy carrier screening, includes a CBC evaluating red cell indices in all pregnant individuals [17]. A hemoglobin electrophoresis (or other protein chemistry method) is performed in addition to a CBC if  there is suspicion of hemoglobinopathy based on ethnicity (, Mediterranean, , Southeast , or West  descent) or if red cell indices show a low mean MCV or MCH. Characteristics other than ethnicity that are associated with a higher risk for an individual to be a hemoglobinopathy carrier include a history of chronic anemia or stillbirth, a relative with a hemoglobin structural variant or thalassemia, and consanguinity In 2022 ACOG updated its recommendations to offer universal hemoglobinopathy testing to people planning pregnancy or at the initial prenatal visit if no prior testing results are available for interpretation. This is based on a high frequency of a hemoglobinopathy trait, in particular S trait, in the [place] and noting that race and self-identified ethnicity are poor alternatives for genetics.  Hemoglobin electrophoresis: Send out lab to [facility].  CPT Code: 25770 or 32506 or 90286 Diagnosis code: Z13.0 Encounter screening for hematological disorder    InfoDif Carrier Screening: A carrier screening panel is available that includes cystic fibrosis, spinal muscular atrophy, hemoglobinopathy and additional autosomal recessive or X-linked disorders.  A full review of the carrier screening panel was available via [employer] website. Please call [phone] or go to [facility]/empower to review billing, prior authorizations or referrals.       ---------------------------------------------------------------------------------------------------------------    [7]  Level II Ultrasound-  This is an abdominal ultrasound done at approximately 20-21 weeks by a perinatology specialist ([facility]Y) at [facility] which looks at many of the baby's internal structures.  Abnormalities in certain structures have been associated with an increased risk of genetic abnormalities.  It also screens for NTD's.  This ultrasound would replace the Level I Ultrasound that we normally perform at our  office around the same time.    [8]  Amniocentesis-  During this procedure, a needle is passed through your abdominal wall to withdrawal some amniotic fluid from around the baby.  It screens for both genetic abnormalities and NTD's and is usually performed around 15-16 weeks.  This test has the highest accuracy for detecting genetic abnormalities, but there may be a small risk of miscarriage or other complications.  A similar procedure called Chorionic Villus Sampling (CVS) is performed by passing a catheter through the vagina to remove a small sample of tissue from the placenta (afterbirth).  CVS may have a higher risk of miscarriage and other rare complications compared to amniocentesis but can be performed earlier in pregnancy.  Amniocentesis is often recommended/offered if any of the previously mentioned tests come back abnormal.  A pamphlet is available if you would like more information about this option.  If you decide to have an amniocentesis, the other tests would be unnecessary.      The above information covers some of the basics.  Pamphlets on the Cell-Free DNA test, Quad Screen and Cystic Fibrosis test should be in your prenatal packet.  Your doctor will discuss this information in more detail, and feel free to ask additional questions.  Also, remember that all of these tests are optional, and will only be performed at your request.  Testing that needs to be done through the perinatologist's office may require 2-3 weeks lead time to schedule.           97.9 normal...

## 2025-06-02 ENCOUNTER — APPOINTMENT (OUTPATIENT)
Dept: UROLOGY | Facility: CLINIC | Age: 60
End: 2025-06-02
Payer: COMMERCIAL

## 2025-06-02 DIAGNOSIS — N20.1 CALCULUS OF URETER: ICD-10-CM

## 2025-06-02 PROCEDURE — 76775 US EXAM ABDO BACK WALL LIM: CPT

## 2025-07-22 NOTE — PROGRESS NOTE ADULT - PROBLEM SELECTOR PROBLEM 7
04/02/2025 office visit/ lab results:    Uric acid 7.6 was mildly elevated.  I would recommend increasing your allopurinol to 200 mg daily.  So you should take 2 of the 100 mg tablets.  Please let me know when you are running low so that we can send in a new prescription for the higher dose.  Dr. Huerta    New rx for new dose allopurinol 200 mg daily will be sent today as pt is already ran out of medicine   Nausea